# Patient Record
Sex: MALE | Race: BLACK OR AFRICAN AMERICAN | NOT HISPANIC OR LATINO | URBAN - METROPOLITAN AREA
[De-identification: names, ages, dates, MRNs, and addresses within clinical notes are randomized per-mention and may not be internally consistent; named-entity substitution may affect disease eponyms.]

---

## 2024-06-23 ENCOUNTER — HOSPITAL ENCOUNTER (INPATIENT)
Facility: HOSPITAL | Age: 84
LOS: 3 days | Discharge: HOME | DRG: 699 | End: 2024-06-27
Attending: EMERGENCY MEDICINE | Admitting: STUDENT IN AN ORGANIZED HEALTH CARE EDUCATION/TRAINING PROGRAM
Payer: MEDICARE

## 2024-06-23 DIAGNOSIS — R06.02 SHORTNESS OF BREATH: ICD-10-CM

## 2024-06-23 DIAGNOSIS — N39.0 CHRONIC UTI: ICD-10-CM

## 2024-06-23 DIAGNOSIS — R07.9 CHEST PAIN: ICD-10-CM

## 2024-06-23 DIAGNOSIS — K52.89 STERCORAL COLITIS: ICD-10-CM

## 2024-06-23 DIAGNOSIS — K59.00 CONSTIPATION, UNSPECIFIED CONSTIPATION TYPE: Primary | ICD-10-CM

## 2024-06-23 DIAGNOSIS — N12 PYELONEPHRITIS: ICD-10-CM

## 2024-06-23 PROCEDURE — 99285 EMERGENCY DEPT VISIT HI MDM: CPT | Mod: 25

## 2024-06-23 PROCEDURE — 93010 ELECTROCARDIOGRAM REPORT: CPT | Mod: ,,, | Performed by: INTERNAL MEDICINE

## 2024-06-23 PROCEDURE — 87635 SARS-COV-2 COVID-19 AMP PRB: CPT | Performed by: STUDENT IN AN ORGANIZED HEALTH CARE EDUCATION/TRAINING PROGRAM

## 2024-06-23 PROCEDURE — 83880 ASSAY OF NATRIURETIC PEPTIDE: CPT | Performed by: STUDENT IN AN ORGANIZED HEALTH CARE EDUCATION/TRAINING PROGRAM

## 2024-06-23 PROCEDURE — 80053 COMPREHEN METABOLIC PANEL: CPT | Performed by: STUDENT IN AN ORGANIZED HEALTH CARE EDUCATION/TRAINING PROGRAM

## 2024-06-23 PROCEDURE — 84484 ASSAY OF TROPONIN QUANT: CPT | Performed by: STUDENT IN AN ORGANIZED HEALTH CARE EDUCATION/TRAINING PROGRAM

## 2024-06-23 PROCEDURE — 93005 ELECTROCARDIOGRAM TRACING: CPT

## 2024-06-23 PROCEDURE — 85025 COMPLETE CBC W/AUTO DIFF WBC: CPT | Performed by: STUDENT IN AN ORGANIZED HEALTH CARE EDUCATION/TRAINING PROGRAM

## 2024-06-23 RX ORDER — FUROSEMIDE 10 MG/ML
40 INJECTION INTRAMUSCULAR; INTRAVENOUS
Status: COMPLETED | OUTPATIENT
Start: 2024-06-24 | End: 2024-06-24

## 2024-06-23 NOTE — Clinical Note
Diagnosis: Constipation, unspecified constipation type [[id number]]   Future Attending Provider: GABRIELA PUENTE III [[id number]]

## 2024-06-24 PROBLEM — R06.02 SHORTNESS OF BREATH: Status: ACTIVE | Noted: 2024-06-24

## 2024-06-24 PROBLEM — K52.9 PROCTOCOLITIS: Status: ACTIVE | Noted: 2024-06-24

## 2024-06-24 PROBLEM — N12 PYELONEPHRITIS: Status: ACTIVE | Noted: 2024-06-24

## 2024-06-24 LAB
ALBUMIN SERPL BCP-MCNC: 3.4 G/DL (ref 3.5–5.2)
ALBUMIN SERPL BCP-MCNC: 3.7 G/DL (ref 3.5–5.2)
ALLENS TEST: NORMAL
ALP SERPL-CCNC: 96 U/L (ref 55–135)
ALP SERPL-CCNC: 97 U/L (ref 55–135)
ALT SERPL W/O P-5'-P-CCNC: 11 U/L (ref 10–44)
ALT SERPL W/O P-5'-P-CCNC: 7 U/L (ref 10–44)
ANION GAP SERPL CALC-SCNC: 11 MMOL/L (ref 8–16)
ANION GAP SERPL CALC-SCNC: 8 MMOL/L (ref 8–16)
ANION GAP SERPL CALC-SCNC: 8 MMOL/L (ref 8–16)
AST SERPL-CCNC: 15 U/L (ref 10–40)
AST SERPL-CCNC: 18 U/L (ref 10–40)
BACTERIA #/AREA URNS HPF: ABNORMAL /HPF
BACTERIA #/AREA URNS HPF: ABNORMAL /HPF
BASOPHILS # BLD AUTO: 0.03 K/UL (ref 0–0.2)
BASOPHILS # BLD AUTO: 0.03 K/UL (ref 0–0.2)
BASOPHILS NFR BLD: 0.4 % (ref 0–1.9)
BASOPHILS NFR BLD: 0.4 % (ref 0–1.9)
BILIRUB SERPL-MCNC: 0.3 MG/DL (ref 0.1–1)
BILIRUB SERPL-MCNC: 0.4 MG/DL (ref 0.1–1)
BILIRUB UR QL STRIP: NEGATIVE
BILIRUB UR QL STRIP: NEGATIVE
BNP SERPL-MCNC: 38 PG/ML (ref 0–99)
BUN SERPL-MCNC: 25 MG/DL (ref 8–23)
BUN SERPL-MCNC: 26 MG/DL (ref 8–23)
BUN SERPL-MCNC: 27 MG/DL (ref 8–23)
CALCIUM SERPL-MCNC: 10 MG/DL (ref 8.7–10.5)
CALCIUM SERPL-MCNC: 9.7 MG/DL (ref 8.7–10.5)
CALCIUM SERPL-MCNC: 9.8 MG/DL (ref 8.7–10.5)
CHLORIDE SERPL-SCNC: 102 MMOL/L (ref 95–110)
CHLORIDE SERPL-SCNC: 102 MMOL/L (ref 95–110)
CHLORIDE SERPL-SCNC: 106 MMOL/L (ref 95–110)
CLARITY UR: ABNORMAL
CLARITY UR: ABNORMAL
CO2 SERPL-SCNC: 33 MMOL/L (ref 23–29)
CO2 SERPL-SCNC: 34 MMOL/L (ref 23–29)
CO2 SERPL-SCNC: 37 MMOL/L (ref 23–29)
COLOR UR: ABNORMAL
COLOR UR: ABNORMAL
CREAT SERPL-MCNC: 0.8 MG/DL (ref 0.5–1.4)
CREAT SERPL-MCNC: 0.8 MG/DL (ref 0.5–1.4)
CREAT SERPL-MCNC: 0.9 MG/DL (ref 0.5–1.4)
CTP QC/QA: YES
DELSYS: NORMAL
DIFFERENTIAL METHOD BLD: ABNORMAL
DIFFERENTIAL METHOD BLD: ABNORMAL
EOSINOPHIL # BLD AUTO: 0.3 K/UL (ref 0–0.5)
EOSINOPHIL # BLD AUTO: 0.5 K/UL (ref 0–0.5)
EOSINOPHIL NFR BLD: 4.4 % (ref 0–8)
EOSINOPHIL NFR BLD: 7.1 % (ref 0–8)
ERYTHROCYTE [DISTWIDTH] IN BLOOD BY AUTOMATED COUNT: 15.5 % (ref 11.5–14.5)
ERYTHROCYTE [DISTWIDTH] IN BLOOD BY AUTOMATED COUNT: 15.5 % (ref 11.5–14.5)
EST. GFR  (NO RACE VARIABLE): >60 ML/MIN/1.73 M^2
FIO2: 21
GLUCOSE SERPL-MCNC: 104 MG/DL (ref 70–110)
GLUCOSE SERPL-MCNC: 109 MG/DL (ref 70–110)
GLUCOSE SERPL-MCNC: 112 MG/DL (ref 70–110)
GLUCOSE UR QL STRIP: NEGATIVE
GLUCOSE UR QL STRIP: NEGATIVE
HCT VFR BLD AUTO: 40.6 % (ref 40–54)
HCT VFR BLD AUTO: 42.5 % (ref 40–54)
HGB BLD-MCNC: 12.6 G/DL (ref 14–18)
HGB BLD-MCNC: 13.2 G/DL (ref 14–18)
HGB UR QL STRIP: ABNORMAL
HGB UR QL STRIP: ABNORMAL
HYALINE CASTS #/AREA URNS LPF: 0 /LPF
HYALINE CASTS #/AREA URNS LPF: 0 /LPF
IMM GRANULOCYTES # BLD AUTO: 0.01 K/UL (ref 0–0.04)
IMM GRANULOCYTES # BLD AUTO: 0.01 K/UL (ref 0–0.04)
IMM GRANULOCYTES NFR BLD AUTO: 0.1 % (ref 0–0.5)
IMM GRANULOCYTES NFR BLD AUTO: 0.1 % (ref 0–0.5)
KETONES UR QL STRIP: NEGATIVE
KETONES UR QL STRIP: NEGATIVE
LDH SERPL L TO P-CCNC: 1.27 MMOL/L (ref 0.5–2.2)
LEUKOCYTE ESTERASE UR QL STRIP: ABNORMAL
LEUKOCYTE ESTERASE UR QL STRIP: ABNORMAL
LYMPHOCYTES # BLD AUTO: 1.7 K/UL (ref 1–4.8)
LYMPHOCYTES # BLD AUTO: 2 K/UL (ref 1–4.8)
LYMPHOCYTES NFR BLD: 24.3 % (ref 18–48)
LYMPHOCYTES NFR BLD: 28.6 % (ref 18–48)
MAGNESIUM SERPL-MCNC: 2.4 MG/DL (ref 1.6–2.6)
MCH RBC QN AUTO: 27.2 PG (ref 27–31)
MCH RBC QN AUTO: 27.4 PG (ref 27–31)
MCHC RBC AUTO-ENTMCNC: 31 G/DL (ref 32–36)
MCHC RBC AUTO-ENTMCNC: 31.1 G/DL (ref 32–36)
MCV RBC AUTO: 88 FL (ref 82–98)
MCV RBC AUTO: 88 FL (ref 82–98)
MICROSCOPIC COMMENT: ABNORMAL
MICROSCOPIC COMMENT: ABNORMAL
MODE: NORMAL
MONOCYTES # BLD AUTO: 0.9 K/UL (ref 0.3–1)
MONOCYTES # BLD AUTO: 1.1 K/UL (ref 0.3–1)
MONOCYTES NFR BLD: 12.6 % (ref 4–15)
MONOCYTES NFR BLD: 15.4 % (ref 4–15)
NEUTROPHILS # BLD AUTO: 3.4 K/UL (ref 1.8–7.7)
NEUTROPHILS # BLD AUTO: 4.1 K/UL (ref 1.8–7.7)
NEUTROPHILS NFR BLD: 48.4 % (ref 38–73)
NEUTROPHILS NFR BLD: 58.2 % (ref 38–73)
NITRITE UR QL STRIP: NEGATIVE
NITRITE UR QL STRIP: POSITIVE
NRBC BLD-RTO: 0 /100 WBC
NRBC BLD-RTO: 0 /100 WBC
PH UR STRIP: 6 [PH] (ref 5–8)
PH UR STRIP: 8 [PH] (ref 5–8)
PHOSPHATE SERPL-MCNC: 2.7 MG/DL (ref 2.7–4.5)
PLATELET # BLD AUTO: 200 K/UL (ref 150–450)
PLATELET # BLD AUTO: 207 K/UL (ref 150–450)
PMV BLD AUTO: 10.9 FL (ref 9.2–12.9)
PMV BLD AUTO: 11 FL (ref 9.2–12.9)
POCT GLUCOSE: 131 MG/DL (ref 70–110)
POTASSIUM SERPL-SCNC: 3.4 MMOL/L (ref 3.5–5.1)
POTASSIUM SERPL-SCNC: 3.6 MMOL/L (ref 3.5–5.1)
POTASSIUM SERPL-SCNC: 3.8 MMOL/L (ref 3.5–5.1)
PROT SERPL-MCNC: 8.2 G/DL (ref 6–8.4)
PROT SERPL-MCNC: 8.8 G/DL (ref 6–8.4)
PROT UR QL STRIP: ABNORMAL
PROT UR QL STRIP: ABNORMAL
RBC # BLD AUTO: 4.63 M/UL (ref 4.6–6.2)
RBC # BLD AUTO: 4.82 M/UL (ref 4.6–6.2)
RBC #/AREA URNS HPF: 10 /HPF (ref 0–4)
RBC #/AREA URNS HPF: 13 /HPF (ref 0–4)
SAMPLE: NORMAL
SARS-COV-2 RDRP RESP QL NAA+PROBE: NEGATIVE
SITE: NORMAL
SODIUM SERPL-SCNC: 147 MMOL/L (ref 136–145)
SP GR UR STRIP: 1.01 (ref 1–1.03)
SP GR UR STRIP: 1.02 (ref 1–1.03)
TRI-PHOS CRY URNS QL MICRO: ABNORMAL
TRI-PHOS CRY URNS QL MICRO: ABNORMAL
TROPONIN I SERPL DL<=0.01 NG/ML-MCNC: <0.006 NG/ML (ref 0–0.03)
URN SPEC COLLECT METH UR: ABNORMAL
URN SPEC COLLECT METH UR: ABNORMAL
UROBILINOGEN UR STRIP-ACNC: NEGATIVE EU/DL
UROBILINOGEN UR STRIP-ACNC: NEGATIVE EU/DL
WBC # BLD AUTO: 7 K/UL (ref 3.9–12.7)
WBC # BLD AUTO: 7.02 K/UL (ref 3.9–12.7)
WBC #/AREA URNS HPF: >100 /HPF (ref 0–5)
WBC #/AREA URNS HPF: >100 /HPF (ref 0–5)
WBC CLUMPS URNS QL MICRO: ABNORMAL

## 2024-06-24 PROCEDURE — 81000 URINALYSIS NONAUTO W/SCOPE: CPT | Mod: 91 | Performed by: EMERGENCY MEDICINE

## 2024-06-24 PROCEDURE — 96365 THER/PROPH/DIAG IV INF INIT: CPT

## 2024-06-24 PROCEDURE — 25000003 PHARM REV CODE 250

## 2024-06-24 PROCEDURE — 87088 URINE BACTERIA CULTURE: CPT | Mod: 59 | Performed by: EMERGENCY MEDICINE

## 2024-06-24 PROCEDURE — 63600175 PHARM REV CODE 636 W HCPCS: Performed by: EMERGENCY MEDICINE

## 2024-06-24 PROCEDURE — 87086 URINE CULTURE/COLONY COUNT: CPT | Performed by: STUDENT IN AN ORGANIZED HEALTH CARE EDUCATION/TRAINING PROGRAM

## 2024-06-24 PROCEDURE — 87077 CULTURE AEROBIC IDENTIFY: CPT | Performed by: STUDENT IN AN ORGANIZED HEALTH CARE EDUCATION/TRAINING PROGRAM

## 2024-06-24 PROCEDURE — 25000003 PHARM REV CODE 250: Performed by: NURSE PRACTITIONER

## 2024-06-24 PROCEDURE — 84100 ASSAY OF PHOSPHORUS: CPT

## 2024-06-24 PROCEDURE — 81000 URINALYSIS NONAUTO W/SCOPE: CPT | Performed by: STUDENT IN AN ORGANIZED HEALTH CARE EDUCATION/TRAINING PROGRAM

## 2024-06-24 PROCEDURE — 25000003 PHARM REV CODE 250: Performed by: EMERGENCY MEDICINE

## 2024-06-24 PROCEDURE — 83605 ASSAY OF LACTIC ACID: CPT

## 2024-06-24 PROCEDURE — 80048 BASIC METABOLIC PNL TOTAL CA: CPT | Mod: XB | Performed by: NURSE PRACTITIONER

## 2024-06-24 PROCEDURE — 87186 SC STD MICRODIL/AGAR DIL: CPT | Mod: 59 | Performed by: STUDENT IN AN ORGANIZED HEALTH CARE EDUCATION/TRAINING PROGRAM

## 2024-06-24 PROCEDURE — 87086 URINE CULTURE/COLONY COUNT: CPT | Mod: 59 | Performed by: EMERGENCY MEDICINE

## 2024-06-24 PROCEDURE — 25000242 PHARM REV CODE 250 ALT 637 W/ HCPCS: Performed by: EMERGENCY MEDICINE

## 2024-06-24 PROCEDURE — 94761 N-INVAS EAR/PLS OXIMETRY MLT: CPT

## 2024-06-24 PROCEDURE — 80053 COMPREHEN METABOLIC PANEL: CPT

## 2024-06-24 PROCEDURE — 94640 AIRWAY INHALATION TREATMENT: CPT

## 2024-06-24 PROCEDURE — 85025 COMPLETE CBC W/AUTO DIFF WBC: CPT

## 2024-06-24 PROCEDURE — 96375 TX/PRO/DX INJ NEW DRUG ADDON: CPT

## 2024-06-24 PROCEDURE — 99900035 HC TECH TIME PER 15 MIN (STAT)

## 2024-06-24 PROCEDURE — 87088 URINE BACTERIA CULTURE: CPT | Performed by: STUDENT IN AN ORGANIZED HEALTH CARE EDUCATION/TRAINING PROGRAM

## 2024-06-24 PROCEDURE — 83735 ASSAY OF MAGNESIUM: CPT

## 2024-06-24 PROCEDURE — 11000001 HC ACUTE MED/SURG PRIVATE ROOM

## 2024-06-24 RX ORDER — AMLODIPINE BESYLATE 5 MG/1
5 TABLET ORAL DAILY
Status: DISCONTINUED | OUTPATIENT
Start: 2024-06-24 | End: 2024-06-27 | Stop reason: HOSPADM

## 2024-06-24 RX ORDER — POTASSIUM CHLORIDE 20 MEQ/1
20 TABLET, EXTENDED RELEASE ORAL ONCE
Status: COMPLETED | OUTPATIENT
Start: 2024-06-24 | End: 2024-06-24

## 2024-06-24 RX ORDER — SODIUM CHLORIDE 9 MG/ML
INJECTION, SOLUTION INTRAVENOUS CONTINUOUS
Status: DISCONTINUED | OUTPATIENT
Start: 2024-06-24 | End: 2024-06-24

## 2024-06-24 RX ORDER — SODIUM CHLORIDE 0.9 % (FLUSH) 0.9 %
10 SYRINGE (ML) INJECTION EVERY 12 HOURS PRN
Status: DISCONTINUED | OUTPATIENT
Start: 2024-06-24 | End: 2024-06-27 | Stop reason: HOSPADM

## 2024-06-24 RX ORDER — POLYETHYLENE GLYCOL 3350 17 G/17G
17 POWDER, FOR SOLUTION ORAL DAILY
Status: DISCONTINUED | OUTPATIENT
Start: 2024-06-24 | End: 2024-06-27 | Stop reason: HOSPADM

## 2024-06-24 RX ORDER — TALC
6 POWDER (GRAM) TOPICAL NIGHTLY PRN
Status: DISCONTINUED | OUTPATIENT
Start: 2024-06-24 | End: 2024-06-27 | Stop reason: HOSPADM

## 2024-06-24 RX ORDER — NALOXONE HCL 0.4 MG/ML
0.02 VIAL (ML) INJECTION
Status: DISCONTINUED | OUTPATIENT
Start: 2024-06-24 | End: 2024-06-27 | Stop reason: HOSPADM

## 2024-06-24 RX ORDER — IBUPROFEN 200 MG
24 TABLET ORAL
Status: DISCONTINUED | OUTPATIENT
Start: 2024-06-24 | End: 2024-06-27 | Stop reason: HOSPADM

## 2024-06-24 RX ORDER — IBUPROFEN 200 MG
16 TABLET ORAL
Status: DISCONTINUED | OUTPATIENT
Start: 2024-06-24 | End: 2024-06-27 | Stop reason: HOSPADM

## 2024-06-24 RX ORDER — AMOXICILLIN AND CLAVULANATE POTASSIUM 875; 125 MG/1; MG/1
1 TABLET, FILM COATED ORAL 2 TIMES DAILY
Qty: 14 TABLET | Refills: 0 | Status: SHIPPED | OUTPATIENT
Start: 2024-06-24 | End: 2024-06-24

## 2024-06-24 RX ORDER — PSEUDOEPHEDRINE/ACETAMINOPHEN 30MG-500MG
100 TABLET ORAL
Status: COMPLETED | OUTPATIENT
Start: 2024-06-24 | End: 2024-06-24

## 2024-06-24 RX ORDER — CARVEDILOL 3.12 MG/1
3.12 TABLET ORAL 2 TIMES DAILY
Status: DISCONTINUED | OUTPATIENT
Start: 2024-06-24 | End: 2024-06-27 | Stop reason: HOSPADM

## 2024-06-24 RX ORDER — POTASSIUM CHLORIDE 750 MG/1
10 TABLET, EXTENDED RELEASE ORAL DAILY
Status: DISCONTINUED | OUTPATIENT
Start: 2024-06-24 | End: 2024-06-24

## 2024-06-24 RX ORDER — DEXTROSE MONOHYDRATE 50 MG/ML
INJECTION, SOLUTION INTRAVENOUS CONTINUOUS
Status: DISCONTINUED | OUTPATIENT
Start: 2024-06-24 | End: 2024-06-24

## 2024-06-24 RX ORDER — TRAZODONE HYDROCHLORIDE 150 MG/1
150 TABLET ORAL NIGHTLY
COMMUNITY
Start: 2024-06-18

## 2024-06-24 RX ORDER — PANTOPRAZOLE SODIUM 40 MG/1
40 TABLET, DELAYED RELEASE ORAL DAILY
Status: DISCONTINUED | OUTPATIENT
Start: 2024-06-24 | End: 2024-06-27 | Stop reason: HOSPADM

## 2024-06-24 RX ORDER — SYRING-NEEDL,DISP,INSUL,0.3 ML 29 G X1/2"
296 SYRINGE, EMPTY DISPOSABLE MISCELLANEOUS
Status: COMPLETED | OUTPATIENT
Start: 2024-06-24 | End: 2024-06-24

## 2024-06-24 RX ORDER — IPRATROPIUM BROMIDE AND ALBUTEROL SULFATE 2.5; .5 MG/3ML; MG/3ML
3 SOLUTION RESPIRATORY (INHALATION)
Status: COMPLETED | OUTPATIENT
Start: 2024-06-24 | End: 2024-06-24

## 2024-06-24 RX ORDER — DEXTROSE MONOHYDRATE 50 MG/ML
INJECTION, SOLUTION INTRAVENOUS CONTINUOUS
Status: ACTIVE | OUTPATIENT
Start: 2024-06-24 | End: 2024-06-24

## 2024-06-24 RX ORDER — SERTRALINE HYDROCHLORIDE 25 MG/1
25 TABLET, FILM COATED ORAL DAILY
Status: DISCONTINUED | OUTPATIENT
Start: 2024-06-24 | End: 2024-06-27 | Stop reason: HOSPADM

## 2024-06-24 RX ORDER — INSULIN ASPART 100 [IU]/ML
0-5 INJECTION, SOLUTION INTRAVENOUS; SUBCUTANEOUS
Status: DISCONTINUED | OUTPATIENT
Start: 2024-06-24 | End: 2024-06-27 | Stop reason: HOSPADM

## 2024-06-24 RX ORDER — GLUCAGON 1 MG
1 KIT INJECTION
Status: DISCONTINUED | OUTPATIENT
Start: 2024-06-24 | End: 2024-06-27 | Stop reason: HOSPADM

## 2024-06-24 RX ORDER — DEXTROSE MONOHYDRATE AND SODIUM CHLORIDE 5; .9 G/100ML; G/100ML
INJECTION, SOLUTION INTRAVENOUS CONTINUOUS
Status: DISCONTINUED | OUTPATIENT
Start: 2024-06-24 | End: 2024-06-24

## 2024-06-24 RX ORDER — ONDANSETRON HYDROCHLORIDE 2 MG/ML
4 INJECTION, SOLUTION INTRAVENOUS EVERY 8 HOURS PRN
Status: DISCONTINUED | OUTPATIENT
Start: 2024-06-24 | End: 2024-06-27 | Stop reason: HOSPADM

## 2024-06-24 RX ORDER — ACETAMINOPHEN 325 MG/1
650 TABLET ORAL EVERY 4 HOURS PRN
Status: DISCONTINUED | OUTPATIENT
Start: 2024-06-24 | End: 2024-06-27 | Stop reason: HOSPADM

## 2024-06-24 RX ADMIN — POLYETHYLENE GLYCOL 3350 17 G: 17 POWDER, FOR SOLUTION ORAL at 08:06

## 2024-06-24 RX ADMIN — SODIUM CHLORIDE 500 ML: 9 INJECTION, SOLUTION INTRAVENOUS at 04:06

## 2024-06-24 RX ADMIN — Medication 100 ML: at 04:06

## 2024-06-24 RX ADMIN — PANTOPRAZOLE SODIUM 40 MG: 40 TABLET, DELAYED RELEASE ORAL at 09:06

## 2024-06-24 RX ADMIN — IPRATROPIUM BROMIDE AND ALBUTEROL SULFATE 3 ML: 2.5; .5 SOLUTION RESPIRATORY (INHALATION) at 01:06

## 2024-06-24 RX ADMIN — FUROSEMIDE 40 MG: 10 INJECTION, SOLUTION INTRAVENOUS at 12:06

## 2024-06-24 RX ADMIN — DEXTROSE MONOHYDRATE: 50 INJECTION, SOLUTION INTRAVENOUS at 09:06

## 2024-06-24 RX ADMIN — SODIUM CHLORIDE: 9 INJECTION, SOLUTION INTRAVENOUS at 07:06

## 2024-06-24 RX ADMIN — DEXTROSE MONOHYDRATE: 50 INJECTION, SOLUTION INTRAVENOUS at 06:06

## 2024-06-24 RX ADMIN — CARVEDILOL 3.12 MG: 3.12 TABLET, FILM COATED ORAL at 08:06

## 2024-06-24 RX ADMIN — Medication 6 MG: at 08:06

## 2024-06-24 RX ADMIN — POTASSIUM CHLORIDE 10 MEQ: 750 TABLET, EXTENDED RELEASE ORAL at 09:06

## 2024-06-24 RX ADMIN — SERTRALINE HYDROCHLORIDE 25 MG: 25 TABLET ORAL at 08:06

## 2024-06-24 RX ADMIN — MAGNESIUM CITRATE 296 ML: 1.75 LIQUID ORAL at 04:06

## 2024-06-24 RX ADMIN — POTASSIUM CHLORIDE 20 MEQ: 1500 TABLET, EXTENDED RELEASE ORAL at 12:06

## 2024-06-24 RX ADMIN — CEFTRIAXONE 1 G: 1 INJECTION, POWDER, FOR SOLUTION INTRAMUSCULAR; INTRAVENOUS at 01:06

## 2024-06-24 RX ADMIN — AMLODIPINE BESYLATE 5 MG: 5 TABLET ORAL at 09:06

## 2024-06-24 NOTE — H&P
[facility]  [facility] Medicine  History & Physical    Patient Name: Jono Rose  MRN: [id number]  Patient Class: OP- Observation  Admission Date: 6/23/2024  Attending Physician: Shaun Ocampo III, MD   Primary Care Provider: Andrae Sanchez MD         Patient information was obtained from patient, past medical records, and ER records.     Subjective:     Principal Problem:Pyelonephritis    Chief Complaint:   Chief Complaint   Patient presents with    Shortness of Breath     Pt arrived via ems, pt chief complaint is Shortness of breath. Pt was sent from [facility] by nursing home doctor due to usual breathing pattern. Per  Pt appears to be using accessory muscles to breath but per sending nurse this is a normal breathing pattern for PT.         HPI: Jono Rose is a 83 y.o. with pmh of atrial fibrillation not on anticoagulation, hypertension, DM, hyperlipidemia, CHF, CAD, history of recurrent UTIs and urinary retention with suprapubic catheter in place, and dementia presents to the hospital from [facility] with complaints of shortness on breath an unusual breathing pattern due to using accessory muscles per  From nursing home. However, patient's nurse from the nursing home states that this is a normal breathing pattern for the patient. Patient states that he has some mild abdominal pain. Denies any urinary complaints and has suprapubic catheter in place. States that he had some fever/chills in the nursing home last few days. Patient denies any other alleviating or exacerbating factors. Review of systems is difficult to obtain from patient due to difficulty with speech and dementia.    In the ED:  Patient afebrile without leukocytosis, hemodynamically stable on presentation, hemoglobin 12.6, sodium 147, BUN 26, BNP and troponin levels normal, COVID influenza flu negative, UA with negative nitrites and 3+ leukocyte with many bacteria and >100 WBC, urine culture pending,  chest x-ray shows no acute intrathoracic abnormality with mild bibasilar atelectasis.  CTA abdomen pelvis shows (1) marked colonic distention with large volume of fecal material and air throughout the colon and rectum with wall thickening of the rectosigmoid colon adjacent inflammatory change findings are concerning for stercoral proctocolitis (2) circumstantial wall thickening of the urinary bladder presumably on the basis of chronic outlet obstruction noting marked prostatomegaly with the prostate identifying the bladde and suprapubic catheter in place (3) non bilateral perinephric fat stranding which can be seen with renal dysfunction or infection and correlate with urinalysis advised. Patient given DuoNeb, Rocephin 1 g IV, furosemide 40 mg IV, and brown bomb enema in the ED. Case discussed with ED provider and patient we will be placed under observation for further medical management.    Past Medical History:   Diagnosis Date    Anemia     Atrial flutter     Bacterial meningitis     BPH (benign prostatic hyperplasia)     CAD (coronary artery disease)     Cataract     CHF (congestive heart failure)     Dementia     Diabetes mellitus     Vargas catheter in place     GERD (gastroesophageal reflux disease)     Glaucoma     History of psychiatric hospitalization     sent to us from [facility]    Hyperlipidemia     Hypertension     Metabolic encephalopathy     Renal disorder     Schizoaffective disorder     Urinary retention     UTI (urinary tract infection)        Past Surgical History:   Procedure Laterality Date    CARDIAC CATHETERIZATION  09/07/2017    VESICOSTOMY N/A 9/27/2022    Procedure: CREATION, CYSTOSTOMY, cystoscopy, bladder stone removal possible laser lithotripsy;  Surgeon: Ruthy Guerra MD;  Location: [facility];  Service: Urology;  Laterality: N/A;  holmium laser needed  RN PHONE PREOP DONE WITH [facility] HOME NURSE,   VACCINATED       Review of patient's allergies indicates:  No Known Allergies    No  current facility-administered medications on file prior to encounter.     Current Outpatient Medications on File Prior to Encounter   Medication Sig    acetaminophen (TYLENOL) 325 MG tablet Take 325 mg by mouth every 6 (six) hours as needed for Pain.    AMINO ACIDS-PROTEIN HYDROLYS ORAL Take 45 mLs by mouth 2 (two) times a day.    amLODIPine (NORVASC) 5 MG tablet Take 5 mg by mouth once daily.    carvedilol (COREG) 3.125 MG tablet Take 3.125 mg by mouth 2 (two) times daily.    cyproheptadine (PERIACTIN) 4 mg tablet Take 4 mg by mouth 2 (two) times daily.    dorzolamide-timolol 2-0.5% (COSOPT) 22.3-6.8 mg/mL ophthalmic solution Place 1 drop into both eyes 2 (two) times daily.    ferrous sulfate 325 (65 FE) MG EC tablet Take 325 mg by mouth 2 (two) times daily.    melatonin (MELATIN) 3 mg tablet Take 6 mg by mouth nightly as needed for Insomnia.    multivitamin with minerals tablet Take 1 tablet by mouth once daily.    nutritional supplements Powd Take 1 packet by mouth 2 (two) times daily.    pantoprazole (PROTONIX) 40 MG tablet Take 1 tablet (40 mg total) by mouth once daily.    polyethylene glycol (GLYCOLAX) 17 gram PwPk Take 17 g by mouth 2 (two) times daily.    potassium chloride SA (K-DUR,KLOR-CON M) 10 MEQ tablet Take 1 tablet (10 mEq total) by mouth once daily.    sertraline (ZOLOFT) 25 MG tablet Take 25 mg by mouth once daily.    [DISCONTINUED] amoxicillin-clavulanate 875-125mg (AUGMENTIN) 875-125 mg per tablet Take 1 tablet by mouth 2 (two) times daily.     Family History       Problem Relation (Age of Onset)    Diabetes Mother, Father, Sister, Brother    Heart disease Mother, Father, Sister, Brother    Hypertension Mother, Father          Tobacco Use    Smoking status: Former     Current packs/day: 0.00     Types: Cigarettes     Quit date: 2003     Years since quittin.9    Smokeless tobacco: Never   Substance and Sexual Activity    Alcohol use: No    Drug use: No    Sexual activity: Not Currently      Review of Systems   Unable to perform ROS: Dementia     Objective:     Vital Signs (Most Recent):  Temp: 97.8 °F (36.6 °C) (06/24/24 0227)  Pulse: (!) 59 (06/24/24 0403)  Resp: (!) 21 (06/24/24 0403)  BP: 137/77 (06/24/24 0403)  SpO2: (!) 93 % (06/24/24 0403) Vital Signs (24h Range):  Temp:  [97.8 °F (36.6 °C)-98.2 °F (36.8 °C)] 97.8 °F (36.6 °C)  Pulse:  [59-63] 59  Resp:  [19-22] 21  SpO2:  [91 %-100 %] 93 %  BP: (112-137)/(64-81) 137/77     Weight: 90.7 kg (200 lb)  Body mass index is 27.89 kg/m².     Physical Exam  Constitutional:       General: He is not in acute distress.     Appearance: He is not diaphoretic.   HENT:      Head: Normocephalic and atraumatic.      Mouth/Throat:      Mouth: Mucous membranes are moist.   Eyes:      Extraocular Movements: Extraocular movements intact.   Cardiovascular:      Rate and Rhythm: Normal rate.      Pulses: Normal pulses.   Pulmonary:      Effort: Pulmonary effort is normal.   Abdominal:      Tenderness: There is abdominal tenderness.      Comments: Generalized abdominal pain, no rebound no tenderness   Musculoskeletal:      Right lower leg: No edema.      Left lower leg: No edema.   Skin:     General: Skin is warm.   Neurological:      General: No focal deficit present.      Mental Status: He is alert and oriented to person, place, and time. Mental status is at baseline.   Psychiatric:         Mood and Affect: Mood normal.                Significant Labs: All pertinent labs within the past 24 hours have been reviewed.    Significant Imaging: I have reviewed all pertinent imaging results/findings within the past 24 hours.  Imaging Results               CT Abdomen Pelvis  Without Contrast (Final result)  Result time 06/24/24 03:04:19      Final result by Rosalinda Sidhu MD (06/24/24 03:04:19)                   Impression:      1. Marked colonic distension with large volume of fecal material and air throughout the colon and rectum with wall thickening of the  rectosigmoid colon and adjacent inflammatory change.  Findings concerning for stercoral proctocolitis..  2. Circumferential wall thickening of the urinary bladder presumably on the basis of chronic outlet obstruction noting marked prostatomegaly with the prostate indenting the bladder base.  Suprapubic catheter in place.  3. Nonspecific bilateral perinephric fat stranding which can be seen with renal dysfunction or infection.  Correlation with urinalysis advised.  Nonobstructing right renal calculus.  4. Additional findings as above.      Electronically signed by: Rosalinda Sidhu MD  Date:    06/24/2024  Time:    03:04               Narrative:    EXAMINATION:  CT ABDOMEN PELVIS WITHOUT CONTRAST    CLINICAL HISTORY:  Abdominal pain, acute, nonlocalized;    TECHNIQUE:  Low dose axial images, sagittal and coronal reformations were obtained from the lung bases to the pubic symphysis.  No oral or IV contrast.    COMPARISON:  CT abdomen and pelvis 07/10/2023    FINDINGS:  There is elevation of the left hemidiaphragm.  The visualized lung bases are free of pleural fluid or focal consolidation allowing for respiratory motion artifact.  There is calcific atherosclerosis of the coronary vessels.    Please note evaluation of solid organ parenchyma is limited due to noncontrast CT technique.  The liver, spleen and pancreas demonstrate an unremarkable noncontrast CT appearance.  There is nonspecific bilateral nodular adrenal gland thickening, left greater than right.  No calcified stones identified in the gallbladder lumen.    There is a 5 mm nonobstructing right renal calculus.  No evidence of hydronephrosis bilaterally.  There is nonspecific bilateral perinephric fat stranding.  Urinary bladder demonstrates circumferential wall thickening.  A suprapubic catheter is in place.  The prostate is enlarged with an irregular nodular contour indenting the bladder base.    The stomach is nondistended.  There is diffuse  dilatation/distension of the entirety of the colon which contains a large volume of fecal material in air.  The rectum is distended with fecal material.  There is wall thickening of the sigmoid colon and rectum with inflammatory change.  The visualized loops of small bowel are without evidence of obstruction.  The appendix is unremarkable.  No free intraperitoneal air or portal venous gas or ascites.    The abdominal aorta is nonaneurysmal with atherosclerosis.  There are shotty periaortic lymph nodes.  No retroperitoneal fluid/hemorrhage.    Visualized osseous structures are intact/unchanged from prior exams.    This report was flagged in Epic as abnormal.                                       X-Ray Chest AP Portable (Final result)  Result time 06/24/24 01:40:28   Procedure changed from X-Ray Chest PA And Lateral     Final result by Nyasia Martin MD (06/24/24 01:40:28)                   Impression:      No acute intrathoracic abnormality detected.  Mild bibasilar atelectasis.      Electronically signed by: Nyasia Martin  Date:    06/24/2024  Time:    01:40               Narrative:    EXAMINATION:  AP PORTABLE CHEST    CLINICAL HISTORY:  dyspnea;    TECHNIQUE:  AP portable chest radiograph was submitted.    COMPARISON:  01/08/2024    FINDINGS:  AP portable chest radiograph demonstrates a cardiac silhouette within normal limits.  There is no focal consolidation, pneumothorax, or pleural effusion.  Mild bibasilar atelectatic changes are present.  There is air within the colonic walls below the diaphragm.  The bones are diffusely osteopenic.                                      Assessment/Plan:     * Pyelonephritis  -Patient presents with generalized abdominal pain and suprapubic catheter in place from [facility]. Patient initially sent to the ER for shortness on breath however on labs more specifically UA showed 3+ leukocyte, negative nitrites, many bacteria, and>100 wbc.   -CT scan with nonspecific  bilateral perinephric fat stranding concerning for pyelonephritis with a nonobstructing right renal calculi.    -Started on Rocephin in the ED for acute cystitis/pyelonephritis     Plan:  -Urine cultures pending  -IVF  cc/hour  -Continue IV Rocephin  -P.r.n. analgesics and antiemetics ordered    Proctocolitis  -Patient presents with generalized abdominal pain, CT abdomen pelvis shows colonic distention with large volume of fecal material and air throughout the colon and rectum with wall thickening of the rectosigmoid colon and adjacent inflammatory change concerning for proctocolitis.  -Patient can not recall his last bowel movement    Plan:  -Patient was given brown bomb in the ED with a large bowel movement according to the nurse  -Continue IV fluid and bowel regimen with daily laxatives  -Clear liquid diet for now, advance as tolerated to regular    Shortness of breath  Presented to the hospital from based on nursing home due to shortness on breath and witnessed accessory muscle usage during breathing.  Patient given breathing treatment while in the ED and currently sats are well 93 to 95.  P.r.n. duo nebs ordered  Supplemental oxygen as needed, wean as tolerated    UTI (urinary tract infection) due to urinary indwelling Vargas catheter  -See above plan for pyelonephritis  -Suprapubic catheter replaced in the ED  -Evidence of infection on UA, urine cultures pending continue IV antibiotics    Paroxysmal atrial fibrillation with RVR  Patient with Paroxysmal (<7 days) atrial fibrillation which is controlled. Patient is currently in sinus rhythm.[id number] Score: 3. Anticoagulation not indicated due to due to risk of bleeding with gastric pneumatosis .    Urinary retention  Patient with a history of urinary retention with suprapubic catheter  Urinalysis shows 3+ leukocyte many bacteria, >100 WBC  Started on IV Rocephin empirically for UTI  Urine culture pending  Catheter replaced in the ED    Anemia of chronic  disease  Patient's anemia is currently controlled. Has not received any PRBCs to date. Etiology likely d/t chronic disease due to Chronic Kidney Disease  Current CBC reviewed-   Lab Results   Component Value Date    HGB 12.6 (L) 06/23/2024    HCT 40.6 06/23/2024     Monitor serial CBC and transfuse if patient becomes hemodynamically unstable, symptomatic or H/H drops below 7/21.    Chronic heart failure with preserved ejection fraction  Patient with a history of diastolic heart failure.  No evidence of fluid overload or acute decompensation.  Continue home regimen, I's and O's, and daily weights    Essential hypertension  Chronic, controlled. Latest blood pressure and vitals reviewed-     Temp:  [97.8 °F (36.6 °C)-98.2 °F (36.8 °C)]   Pulse:  [59-63]   Resp:  [19-22]   BP: (112-137)/(64-81)   SpO2:  [91 %-100 %] .   Home meds for hypertension were reviewed and noted below.   Hypertension Medications               amLODIPine (NORVASC) 5 MG tablet Take 5 mg by mouth once daily.    carvedilol (COREG) 3.125 MG tablet Take 3.125 mg by mouth 2 (two) times daily.            While in the hospital, will manage blood pressure as follows; Continue home antihypertensive regimen    Will utilize p.r.n. blood pressure medication only if patient's blood pressure greater than 180/110 and he develops symptoms such as worsening chest pain or shortness of breath.    Hyperlipidemia  Patient currently not on statin, defer to PCP      VTE Risk Mitigation (From admission, onward)           Ordered     Reason for No Pharmacological VTE Prophylaxis  Once        Question:  Reasons:  Answer:  Risk of Bleeding    06/24/24 0415     IP VTE HIGH RISK PATIENT  Once         06/24/24 0415     Place sequential compression device  Until discontinued         06/24/24 0415                     On 06/24/2024, patient should be placed in hospital observation services under my care in collaboration with Shaun Ocampo MD.      AdmissionCare    Guideline:  Gastroenterology, Inpatient    Based on the indications selected for the patient, the bed status of Inpatient was determined to be NOT MET    The following indications were selected as present at the time of evaluation of the patient:      - Clinical Indications for Admission to Inpatient Care            - Hospital admission is needed for appropriate care of the patient because of 1 or more of the following:    AdmissionCare documentation entered by: ELIANE Rahman    [facility], 28th edition, Copyright © 2024 [facility], [facility] All Rights Reserved.  [id number]:00    Lupis Arora PA-C  Department of Hospital Medicine  [facility] - Observation Unit

## 2024-06-24 NOTE — PLAN OF CARE
Inpatient Upgrade Note    Jono Rose has warranted treatment spanning two or more midnights of hospital level care for the management of  pyelonephritis and proctocolitis . He continues to require IV antibiotics, daily labs, monitoring of vital signs, and advancing diet. His condition is also complicated by the following comorbidities: Coronary Artery Disease, Hypertension, Diabetes, and Heart failure.

## 2024-06-24 NOTE — ASSESSMENT & PLAN NOTE
Patient with Paroxysmal (<7 days) atrial fibrillation which is controlled. Patient is currently in sinus rhythm.[id number] Score: 3. Anticoagulation not indicated due to due to risk of bleeding with gastric pneumatosis .

## 2024-06-24 NOTE — ASSESSMENT & PLAN NOTE
-Patient presents with generalized abdominal pain and suprapubic catheter in place from [facility]. Patient initially sent to the ER for shortness on breath however on labs more specifically UA showed 3+ leukocyte, negative nitrites, many bacteria, and>100 wbc.   -CT scan with nonspecific bilateral perinephric fat stranding concerning for pyelonephritis with a nonobstructing right renal calculi.    -Started on Rocephin in the ED for acute cystitis/pyelonephritis     Plan:  -Urine cultures pending  -IVF  cc/hour  -Continue IV Rocephin  -P.r.n. analgesics and antiemetics ordered

## 2024-06-24 NOTE — CARE UPDATE
83 year old male admitted for Pyelonephritis    Patient afebrile without leukocytosis, hemodynamically stable on presentation, hemoglobin 12.6, sodium 147, BUN 26, BNP and troponin levels normal, COVID influenza flu negative, UA with negative nitrites and 3+ leukocyte with many bacteria and >100 WBC, urine culture pending, chest x-ray shows no acute intrathoracic abnormality with mild bibasilar atelectasis. CTA abdomen pelvis shows (1) marked colonic distention with large volume of fecal material and air throughout the colon and rectum with wall thickening of the rectosigmoid colon adjacent inflammatory change findings are concerning for stercoral proctocolitis (2) circumstantial wall thickening of the urinary bladder presumably on the basis of chronic outlet obstruction noting marked prostatomegaly with the prostate identifying the bladde and suprapubic catheter in place (3) non bilateral perinephric fat stranding which can be seen with renal dysfunction or infection and correlate with urinalysis advised \\      Patient seen ane examined.  Reviewed H&P, labs, and vital signs  Switched IVF to D5 at 50 ml/hr   Repeat BMP at noon and in the am   Continue with current medical regimen

## 2024-06-24 NOTE — HPI
Jono Rose is a 83 y.o. with pmh of atrial fibrillation not on anticoagulation, hypertension, DM, hyperlipidemia, CHF, CAD, history of recurrent UTIs and urinary retention with suprapubic catheter in place, and dementia presents to the hospital from [facility] with complaints of shortness on breath an unusual breathing pattern due to using accessory muscles per  From nursing home. However, patient's nurse from the nursing home states that this is a normal breathing pattern for the patient. Patient states that he has some mild abdominal pain. Denies any urinary complaints and has suprapubic catheter in place. States that he had some fever/chills in the nursing home last few days. Patient denies any other alleviating or exacerbating factors. Review of systems is difficult to obtain from patient due to difficulty with speech and dementia.    In the ED:  Patient afebrile without leukocytosis, hemodynamically stable on presentation, hemoglobin 12.6, sodium 147, BUN 26, BNP and troponin levels normal, COVID influenza flu negative, UA with negative nitrites and 3+ leukocyte with many bacteria and >100 WBC, urine culture pending, chest x-ray shows no acute intrathoracic abnormality with mild bibasilar atelectasis.  CTA abdomen pelvis shows (1) marked colonic distention with large volume of fecal material and air throughout the colon and rectum with wall thickening of the rectosigmoid colon adjacent inflammatory change findings are concerning for stercoral proctocolitis (2) circumstantial wall thickening of the urinary bladder presumably on the basis of chronic outlet obstruction noting marked prostatomegaly with the prostate identifying the bladde and suprapubic catheter in place (3) non bilateral perinephric fat stranding which can be seen with renal dysfunction or infection and correlate with urinalysis advised. Patient given DuoNeb, Rocephin 1 g IV, furosemide 40 mg IV, and brown bomb enema in the ED. Case  discussed with ED provider and patient we will be placed under observation for further medical management.

## 2024-06-24 NOTE — ASSESSMENT & PLAN NOTE
Chronic, controlled. Latest blood pressure and vitals reviewed-     Temp:  [97.8 °F (36.6 °C)-98.5 °F (36.9 °C)]   Pulse:  [59-67]   Resp:  [18-22]   BP: (112-137)/(64-82)   SpO2:  [91 %-100 %] .   Home meds for hypertension were reviewed and noted below.   Hypertension Medications               amLODIPine (NORVASC) 5 MG tablet Take 5 mg by mouth once daily.    carvedilol (COREG) 3.125 MG tablet Take 3.125 mg by mouth 2 (two) times daily.            While in the hospital, will manage blood pressure as follows; Continue home antihypertensive regimen    Will utilize p.r.n. blood pressure medication only if patient's blood pressure greater than 180/110 and he develops symptoms such as worsening chest pain or shortness of breath.

## 2024-06-24 NOTE — ED PROVIDER NOTES
Encounter Date: 6/23/2024    SCRIBE #1 NOTE: I, Brooke Steiner, am scribing for, and in the presence of,  Bravo Tarango MD.       History     Chief Complaint   Patient presents with    Shortness of Breath     Pt arrived via ems, pt chief complaint is Shortness of breath. Pt was sent from [facility] by [facility] home doctor due to usual breathing pattern. Per DR. Pt appears to be using accessory muscles to breath but per sending nurse this is a normal breathing pattern for PT.      84 yo M w/ PMHx of Aflutter, CHF, CAD, DM, dementia, GERD, HTN, HLD, hx of previous UTIs presenting to the ED for reported dyspnea. Patient is currently a resident at [facility] and was sent here for evaluation due to reported abnormal breathing, using accessory muscles to breathe. He currently denies any dyspnea, leg swelling, any somatic pain, other complaints. Patient also has a suprapubic catheter in place.   History from patient limited d/t dementia.     The history is provided by the patient and the nursing home.     Review of patient's allergies indicates:  No Known Allergies  Past Medical History:   Diagnosis Date    Anemia     Atrial flutter     Bacterial meningitis     BPH (benign prostatic hyperplasia)     CAD (coronary artery disease)     Cataract     CHF (congestive heart failure)     Dementia     Diabetes mellitus     Vargas catheter in place     GERD (gastroesophageal reflux disease)     Glaucoma     History of psychiatric hospitalization     sent to us from [facility]    Hyperlipidemia     Hypertension     Metabolic encephalopathy     Nursing home resident     Renal disorder     Schizoaffective disorder     Urinary retention     UTI (urinary tract infection)      Past Surgical History:   Procedure Laterality Date    CARDIAC CATHETERIZATION  09/07/2017    VESICOSTOMY N/A 9/27/2022    Procedure: CREATION, CYSTOSTOMY, cystoscopy, bladder stone removal possible laser lithotripsy;  Surgeon: Ruthy Guerra MD;   Location: [facility] OR;  Service: Urology;  Laterality: N/A;  holmium laser needed  RN PHONE PREOP DONE WITH [facility] HOME NURSE,   VACCINATED     Family History   Problem Relation Name Age of Onset    Hypertension Mother      Heart disease Mother      Diabetes Mother      Heart disease Father      Hypertension Father      Diabetes Father      Heart disease Sister      Diabetes Sister      Heart disease Brother      Diabetes Brother      Anxiety disorder Neg Hx      Depression Neg Hx       Social History     Tobacco Use    Smoking status: Former     Current packs/day: 0.00     Types: Cigarettes     Quit date: 2003     Years since quittin.9    Smokeless tobacco: Never   Substance Use Topics    Alcohol use: No    Drug use: No     Review of Systems   Unable to perform ROS: Dementia       Physical Exam     Initial Vitals [24 2312]   BP Pulse Resp Temp SpO2   123/77 62 (!) 22 98.2 °F (36.8 °C) 100 %      MAP       --         Physical Exam    Nursing note and vitals reviewed.  Constitutional: He appears well-developed and well-nourished. He is not diaphoretic. No distress.   HENT:   Head: Normocephalic and atraumatic.   Right Ear: External ear normal.   Left Ear: External ear normal.   Nose: Nose normal.   Eyes: Conjunctivae are normal. No scleral icterus.   Neck: Neck supple. No tracheal deviation present.   Cardiovascular:  Normal rate, regular rhythm and normal heart sounds.     Exam reveals no gallop and no friction rub.       No murmur heard.  Pulmonary/Chest: Breath sounds normal. No respiratory distress.   Mild increased accessory muscle usage. No rapid breathing.    Abdominal: Abdomen is soft. Bowel sounds are normal. There is no abdominal tenderness.   Genitourinary:    Genitourinary Comments: Suprapubic catheter in place     Musculoskeletal:         General: No edema.      Cervical back: Neck supple.     Neurological: He is alert and oriented to person, place, and time. GCS score is 15. GCS eye subscore is  4. GCS verbal subscore is 5. GCS motor subscore is 6.   Skin: Skin is warm and dry.   Psychiatric: He has a normal mood and affect. Thought content normal.         ED Course   Critical Care    Date/Time: 6/24/2024 7:22 AM    Performed by: Bravo Tarango MD  Authorized by: Bravo Tarango MD  Direct patient critical care time: 20 minutes  Additional history critical care time: 5 minutes  Ordering / reviewing critical care time: 10 minutes  Documentation critical care time: 10 minutes  Consulting other physicians critical care time: 10 minutes  Total critical care time (exclusive of procedural time) : 55 minutes  Critical care time was exclusive of teaching time and separately billable procedures and treating other patients.  Critical care was necessary to treat or prevent imminent or life-threatening deterioration of the following conditions: respiratory failure and sepsis.  Critical care was time spent personally by me on the following activities: development of treatment plan with patient or surrogate, discussions with consultants, evaluation of patient's response to treatment, examination of patient, obtaining history from patient or surrogate, ordering and performing treatments and interventions, ordering and review of laboratory studies, ordering and review of radiographic studies, pulse oximetry, re-evaluation of patient's condition, review of old charts and interpretation of cardiac output measurements.        Labs Reviewed   CBC W/ AUTO DIFFERENTIAL - Abnormal; Notable for the following components:       Result Value    Hemoglobin 12.6 (*)     MCHC 31.0 (*)     RDW 15.5 (*)     Mono # 1.1 (*)     Mono % 15.4 (*)     All other components within normal limits   COMPREHENSIVE METABOLIC PANEL - Abnormal; Notable for the following components:    Sodium 147 (*)     CO2 33 (*)     BUN 26 (*)     Albumin 3.4 (*)     ALT 7 (*)     All other components within normal limits   URINALYSIS, REFLEX TO URINE  CULTURE - Abnormal; Notable for the following components:    Color, UA Orange (*)     Appearance, UA Cloudy (*)     Protein, UA 2+ (*)     Occult Blood UA 2+ (*)     Nitrite, UA Positive (*)     Leukocytes, UA 3+ (*)     All other components within normal limits    Narrative:     Specimen Source->Urine   URINALYSIS MICROSCOPIC - Abnormal; Notable for the following components:    RBC, UA 10 (*)     WBC, UA >100 (*)     Bacteria Many (*)     All other components within normal limits    Narrative:     Specimen Source->Urine   URINALYSIS, REFLEX TO URINE CULTURE - Abnormal; Notable for the following components:    Color, UA Orange (*)     Appearance, UA Cloudy (*)     Protein, UA 1+ (*)     Occult Blood UA 3+ (*)     Leukocytes, UA 3+ (*)     All other components within normal limits    Narrative:     Specimen Source->Urine   URINALYSIS MICROSCOPIC - Abnormal; Notable for the following components:    RBC, UA 13 (*)     WBC, UA >100 (*)     WBC Clumps, UA Many (*)     Bacteria Many (*)     All other components within normal limits    Narrative:     Specimen Source->Urine   COMPREHENSIVE METABOLIC PANEL - Abnormal; Notable for the following components:    Sodium 147 (*)     CO2 34 (*)     BUN 25 (*)     Total Protein 8.8 (*)     All other components within normal limits   CBC W/ AUTO DIFFERENTIAL - Abnormal; Notable for the following components:    Hemoglobin 13.2 (*)     MCHC 31.1 (*)     RDW 15.5 (*)     All other components within normal limits   BASIC METABOLIC PANEL - Abnormal; Notable for the following components:    Sodium 147 (*)     Potassium 3.4 (*)     CO2 37 (*)     Glucose 112 (*)     BUN 27 (*)     All other components within normal limits   B-TYPE NATRIURETIC PEPTIDE   TROPONIN I   MAGNESIUM   PHOSPHORUS   SARS-COV-2 RDRP GENE   ISTAT LACTATE   POCT GLUCOSE MONITORING CONTINUOUS     EKG Readings: (Independently Interpreted)   Initial Reading: No STEMI. Ectopy: No Ectopy.   Normal sinus rhythm rate of 62,  nonspecific ST and T-wave abnormalities.     ECG Results              EKG 12-lead (Final result)        Collection Time Result Time QRS Duration OHS QTC Calculation    06/23/24 23:35:16 06/25/24 23:34:15 86 450                     Final result by Lucy, Lab In [facility] (06/25/24 23:34:24)                   Narrative:    Test Reason : R06.02,    Vent. Rate : 062 BPM     Atrial Rate : 062 BPM     P-R Int : 156 ms          QRS Dur : 086 ms      QT Int : 444 ms       P-R-T Axes : 022 015 118 degrees     QTc Int : 450 ms    Normal sinus rhythm  Nonspecific ST and T wave abnormality  Abnormal ECG  When compared with ECG of 08-JAN-2024 14:16,  Significant changes have occurred  Confirmed by Dahiana MONTALVO, Santos BRITTON ([id number]) on 6/25/2024 11:34:14 PM    Referred By: KEILA   SELF           Confirmed By:Santos Talbot MD                                  Imaging Results               CT Abdomen Pelvis  Without Contrast (Final result)  Result time 06/24/24 03:04:19      Final result by Rosalinda Sidhu MD (06/24/24 03:04:19)                   Impression:      1. Marked colonic distension with large volume of fecal material and air throughout the colon and rectum with wall thickening of the rectosigmoid colon and adjacent inflammatory change.  Findings concerning for stercoral proctocolitis..  2. Circumferential wall thickening of the urinary bladder presumably on the basis of chronic outlet obstruction noting marked prostatomegaly with the prostate indenting the bladder base.  Suprapubic catheter in place.  3. Nonspecific bilateral perinephric fat stranding which can be seen with renal dysfunction or infection.  Correlation with urinalysis advised.  Nonobstructing right renal calculus.  4. Additional findings as above.      Electronically signed by: Rosalinda Sidhu MD  Date:    06/24/2024  Time:    03:04               Narrative:    EXAMINATION:  CT ABDOMEN PELVIS WITHOUT CONTRAST    CLINICAL HISTORY:  Abdominal pain, acute,  nonlocalized;    TECHNIQUE:  Low dose axial images, sagittal and coronal reformations were obtained from the lung bases to the pubic symphysis.  No oral or IV contrast.    COMPARISON:  CT abdomen and pelvis 07/10/2023    FINDINGS:  There is elevation of the left hemidiaphragm.  The visualized lung bases are free of pleural fluid or focal consolidation allowing for respiratory motion artifact.  There is calcific atherosclerosis of the coronary vessels.    Please note evaluation of solid organ parenchyma is limited due to noncontrast CT technique.  The liver, spleen and pancreas demonstrate an unremarkable noncontrast CT appearance.  There is nonspecific bilateral nodular adrenal gland thickening, left greater than right.  No calcified stones identified in the gallbladder lumen.    There is a 5 mm nonobstructing right renal calculus.  No evidence of hydronephrosis bilaterally.  There is nonspecific bilateral perinephric fat stranding.  Urinary bladder demonstrates circumferential wall thickening.  A suprapubic catheter is in place.  The prostate is enlarged with an irregular nodular contour indenting the bladder base.    The stomach is nondistended.  There is diffuse dilatation/distension of the entirety of the colon which contains a large volume of fecal material in air.  The rectum is distended with fecal material.  There is wall thickening of the sigmoid colon and rectum with inflammatory change.  The visualized loops of small bowel are without evidence of obstruction.  The appendix is unremarkable.  No free intraperitoneal air or portal venous gas or ascites.    The abdominal aorta is nonaneurysmal with atherosclerosis.  There are shotty periaortic lymph nodes.  No retroperitoneal fluid/hemorrhage.    Visualized osseous structures are intact/unchanged from prior exams.    This report was flagged in Epic as abnormal.                                       X-Ray Chest AP Portable (Final result)  Result time 06/24/24  01:40:28   Procedure changed from X-Ray Chest PA And Lateral     Final result by Nyasia Martin MD (06/24/24 01:40:28)                   Impression:      No acute intrathoracic abnormality detected.  Mild bibasilar atelectasis.      Electronically signed by: Nyasia Martin  Date:    06/24/2024  Time:    01:40               Narrative:    EXAMINATION:  AP PORTABLE CHEST    CLINICAL HISTORY:  dyspnea;    TECHNIQUE:  AP portable chest radiograph was submitted.    COMPARISON:  01/08/2024    FINDINGS:  AP portable chest radiograph demonstrates a cardiac silhouette within normal limits.  There is no focal consolidation, pneumothorax, or pleural effusion.  Mild bibasilar atelectatic changes are present.  There is air within the colonic walls below the diaphragm.  The bones are diffusely osteopenic.                                       Medications   sodium chloride 0.9% flush 10 mL (has no administration in time range)   naloxone 0.4 mg/mL injection 0.02 mg (has no administration in time range)   glucose chewable tablet 16 g (has no administration in time range)   glucose chewable tablet 24 g (has no administration in time range)   glucagon (human recombinant) injection 1 mg (has no administration in time range)   acetaminophen tablet 650 mg (has no administration in time range)   ondansetron injection 4 mg (has no administration in time range)   melatonin tablet 6 mg (6 mg Oral Given 6/25/24 2028)   dextrose 10% bolus 125 mL 125 mL (has no administration in time range)   dextrose 10% bolus 250 mL 250 mL (has no administration in time range)   polyethylene glycol packet 17 g (17 g Oral Given 6/25/24 0920)   amLODIPine tablet 5 mg (5 mg Oral Given 6/25/24 0919)   carvediloL tablet 3.125 mg (3.125 mg Oral Given 6/25/24 2027)   pantoprazole EC tablet 40 mg (40 mg Oral Given 6/25/24 0919)   sertraline tablet 25 mg (25 mg Oral Given 6/25/24 0919)   glucose chewable tablet 16 g (has no administration in time range)    glucose chewable tablet 24 g (has no administration in time range)   glucagon (human recombinant) injection 1 mg (has no administration in time range)   insulin aspart U-100 pen 0-5 Units (has no administration in time range)   dextrose 10% bolus 125 mL 125 mL (has no administration in time range)   dextrose 10% bolus 250 mL 250 mL (has no administration in time range)   D5W infusion ( Intravenous New Bag 6/24/24 1832)   senna-docusate 8.6-50 mg per tablet 2 tablet (2 tablets Oral Given 6/25/24 2028)   ertapenem (INVANZ) 1 g in 0.9% NaCl 100 mL IVPB (MB+) (has no administration in time range)   furosemide injection 40 mg (40 mg Intravenous Given 6/24/24 0034)   albuterol-ipratropium 2.5 mg-0.5 mg/3 mL nebulizer solution 3 mL (3 mLs Nebulization Given 6/24/24 0101)   cefTRIAXone (Rocephin) 1 g in D5W 100 mL IVPB (MB+) (0 g Intravenous Stopped 6/24/24 0214)   glycerin 99.5% topical solution 100 mL (100 mLs Rectal Given 6/24/24 0415)     And   magnesium citrate solution 296 mL (296 mLs Rectal Given 6/24/24 0415)     And   sodium chloride 0.9% bolus 500 mL 500 mL (0 mLs Rectal Stopped 6/24/24 0430)   potassium chloride SA CR tablet 20 mEq (20 mEq Oral Given 6/24/24 1257)   bisacodyL suppository 10 mg (10 mg Rectal Given 6/25/24 1740)     Medical Decision Making  84 yo M w/ PMHx of Aflutter, CHF, CAD, DM, dementia, GERD, HTN, HLD, hx of previous UTIs presenting to the ED for reported dyspnea. VSS, nursing notes reviewed. Physical exam as above. Differential diagnosis includes, but is not limited to: pulmonary infectious process, allergic rhinitis, postnasal drip, allergic bronchitis, sinusitis, infectious bronchitis, COPD, asthma, pulmonary embolus, pleural effusion, myocardial ischemia, cardiac valvulopathy, and congestive heart failure. Will order labs, cxr, EKG and reassess.     Amount and/or Complexity of Data Reviewed  Labs: ordered. Decision-making details documented in ED Course.  Radiology: ordered.  Decision-making details documented in ED Course.  ECG/medicine tests: ordered and independent interpretation performed. Decision-making details documented in ED Course.    Risk  OTC drugs.  Prescription drug management.  Decision regarding hospitalization.            Scribe Attestation:   Scribe #1: I performed the above scribed service and the documentation accurately describes the services I performed. I attest to the accuracy of the note.                           I, Bravo Tarango, personally performed the services described in this documentation. All medical record entries made by the scribe were at my direction and in my presence. I have reviewed the chart and agree that the record reflects my personal performance and is accurate and complete.      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

## 2024-06-24 NOTE — SUBJECTIVE & OBJECTIVE
Past Medical History:   Diagnosis Date    Anemia     Atrial flutter     Bacterial meningitis     BPH (benign prostatic hyperplasia)     CAD (coronary artery disease)     Cataract     CHF (congestive heart failure)     Dementia     Diabetes mellitus     Vargas catheter in place     GERD (gastroesophageal reflux disease)     Glaucoma     History of psychiatric hospitalization     sent to us from [facility]    Hyperlipidemia     Hypertension     Metabolic encephalopathy     Renal disorder     Schizoaffective disorder     Urinary retention     UTI (urinary tract infection)        Past Surgical History:   Procedure Laterality Date    CARDIAC CATHETERIZATION  09/07/2017    VESICOSTOMY N/A 9/27/2022    Procedure: CREATION, CYSTOSTOMY, cystoscopy, bladder stone removal possible laser lithotripsy;  Surgeon: Ruthy Guerra MD;  Location: [facility];  Service: Urology;  Laterality: N/A;  holmium laser needed  RN PHONE PREOP DONE WITH [facility] NURSE,   VACCINATED       Review of patient's allergies indicates:  No Known Allergies    No current facility-administered medications on file prior to encounter.     Current Outpatient Medications on File Prior to Encounter   Medication Sig    acetaminophen (TYLENOL) 325 MG tablet Take 325 mg by mouth every 6 (six) hours as needed for Pain.    AMINO ACIDS-PROTEIN HYDROLYS ORAL Take 45 mLs by mouth 2 (two) times a day.    amLODIPine (NORVASC) 5 MG tablet Take 5 mg by mouth once daily.    carvedilol (COREG) 3.125 MG tablet Take 3.125 mg by mouth 2 (two) times daily.    cyproheptadine (PERIACTIN) 4 mg tablet Take 4 mg by mouth 2 (two) times daily.    dorzolamide-timolol 2-0.5% (COSOPT) 22.3-6.8 mg/mL ophthalmic solution Place 1 drop into both eyes 2 (two) times daily.    ferrous sulfate 325 (65 FE) MG EC tablet Take 325 mg by mouth 2 (two) times daily.    melatonin (MELATIN) 3 mg tablet Take 6 mg by mouth nightly as needed for Insomnia.    multivitamin with minerals tablet Take 1  tablet by mouth once daily.    nutritional supplements Powd Take 1 packet by mouth 2 (two) times daily.    pantoprazole (PROTONIX) 40 MG tablet Take 1 tablet (40 mg total) by mouth once daily.    polyethylene glycol (GLYCOLAX) 17 gram PwPk Take 17 g by mouth 2 (two) times daily.    potassium chloride SA (K-DUR,KLOR-CON M) 10 MEQ tablet Take 1 tablet (10 mEq total) by mouth once daily.    sertraline (ZOLOFT) 25 MG tablet Take 25 mg by mouth once daily.    [DISCONTINUED] amoxicillin-clavulanate 875-125mg (AUGMENTIN) 875-125 mg per tablet Take 1 tablet by mouth 2 (two) times daily.     Family History       Problem Relation (Age of Onset)    Diabetes Mother, Father, Sister, Brother    Heart disease Mother, Father, Sister, Brother    Hypertension Mother, Father          Tobacco Use    Smoking status: Former     Current packs/day: 0.00     Types: Cigarettes     Quit date: 2003     Years since quittin.9    Smokeless tobacco: Never   Substance and Sexual Activity    Alcohol use: No    Drug use: No    Sexual activity: Not Currently     Review of Systems   Unable to perform ROS: Dementia     Objective:     Vital Signs (Most Recent):  Temp: 97.8 °F (36.6 °C) (24 0227)  Pulse: (!) 59 (24 0403)  Resp: (!) 21 (24)  BP: 137/77 (24)  SpO2: (!) 93 % (24) Vital Signs (24h Range):  Temp:  [97.8 °F (36.6 °C)-98.2 °F (36.8 °C)] 97.8 °F (36.6 °C)  Pulse:  [59-63] 59  Resp:  [19-22] 21  SpO2:  [91 %-100 %] 93 %  BP: (112-137)/(64-81) 137/77     Weight: 90.7 kg (200 lb)  Body mass index is 27.89 kg/m².     Physical Exam  Constitutional:       General: He is not in acute distress.     Appearance: He is not diaphoretic.   HENT:      Head: Normocephalic and atraumatic.      Mouth/Throat:      Mouth: Mucous membranes are moist.   Eyes:      Extraocular Movements: Extraocular movements intact.   Cardiovascular:      Rate and Rhythm: Normal rate.      Pulses: Normal pulses.   Pulmonary:       Effort: Pulmonary effort is normal.   Abdominal:      Tenderness: There is abdominal tenderness.      Comments: Generalized abdominal pain, no rebound no tenderness   Musculoskeletal:      Right lower leg: No edema.      Left lower leg: No edema.   Skin:     General: Skin is warm.   Neurological:      General: No focal deficit present.      Mental Status: He is alert and oriented to person, place, and time. Mental status is at baseline.   Psychiatric:         Mood and Affect: Mood normal.                Significant Labs: All pertinent labs within the past 24 hours have been reviewed.    Significant Imaging: I have reviewed all pertinent imaging results/findings within the past 24 hours.  Imaging Results               CT Abdomen Pelvis  Without Contrast (Final result)  Result time 06/24/24 03:04:19      Final result by Rosalinda Sidhu MD (06/24/24 03:04:19)                   Impression:      1. Marked colonic distension with large volume of fecal material and air throughout the colon and rectum with wall thickening of the rectosigmoid colon and adjacent inflammatory change.  Findings concerning for stercoral proctocolitis..  2. Circumferential wall thickening of the urinary bladder presumably on the basis of chronic outlet obstruction noting marked prostatomegaly with the prostate indenting the bladder base.  Suprapubic catheter in place.  3. Nonspecific bilateral perinephric fat stranding which can be seen with renal dysfunction or infection.  Correlation with urinalysis advised.  Nonobstructing right renal calculus.  4. Additional findings as above.      Electronically signed by: Rosalinda Sidhu MD  Date:    06/24/2024  Time:    03:04               Narrative:    EXAMINATION:  CT ABDOMEN PELVIS WITHOUT CONTRAST    CLINICAL HISTORY:  Abdominal pain, acute, nonlocalized;    TECHNIQUE:  Low dose axial images, sagittal and coronal reformations were obtained from the lung bases to the pubic symphysis.  No oral or IV  contrast.    COMPARISON:  CT abdomen and pelvis 07/10/2023    FINDINGS:  There is elevation of the left hemidiaphragm.  The visualized lung bases are free of pleural fluid or focal consolidation allowing for respiratory motion artifact.  There is calcific atherosclerosis of the coronary vessels.    Please note evaluation of solid organ parenchyma is limited due to noncontrast CT technique.  The liver, spleen and pancreas demonstrate an unremarkable noncontrast CT appearance.  There is nonspecific bilateral nodular adrenal gland thickening, left greater than right.  No calcified stones identified in the gallbladder lumen.    There is a 5 mm nonobstructing right renal calculus.  No evidence of hydronephrosis bilaterally.  There is nonspecific bilateral perinephric fat stranding.  Urinary bladder demonstrates circumferential wall thickening.  A suprapubic catheter is in place.  The prostate is enlarged with an irregular nodular contour indenting the bladder base.    The stomach is nondistended.  There is diffuse dilatation/distension of the entirety of the colon which contains a large volume of fecal material in air.  The rectum is distended with fecal material.  There is wall thickening of the sigmoid colon and rectum with inflammatory change.  The visualized loops of small bowel are without evidence of obstruction.  The appendix is unremarkable.  No free intraperitoneal air or portal venous gas or ascites.    The abdominal aorta is nonaneurysmal with atherosclerosis.  There are shotty periaortic lymph nodes.  No retroperitoneal fluid/hemorrhage.    Visualized osseous structures are intact/unchanged from prior exams.    This report was flagged in Epic as abnormal.                                       X-Ray Chest AP Portable (Final result)  Result time 06/24/24 01:40:28   Procedure changed from X-Ray Chest PA And Lateral     Final result by Nyasia Martin MD (06/24/24 01:40:28)                   Impression:       No acute intrathoracic abnormality detected.  Mild bibasilar atelectasis.      Electronically signed by: Nyasia Martin  Date:    06/24/2024  Time:    01:40               Narrative:    EXAMINATION:  AP PORTABLE CHEST    CLINICAL HISTORY:  dyspnea;    TECHNIQUE:  AP portable chest radiograph was submitted.    COMPARISON:  01/08/2024    FINDINGS:  AP portable chest radiograph demonstrates a cardiac silhouette within normal limits.  There is no focal consolidation, pneumothorax, or pleural effusion.  Mild bibasilar atelectatic changes are present.  There is air within the colonic walls below the diaphragm.  The bones are diffusely osteopenic.

## 2024-06-24 NOTE — ASSESSMENT & PLAN NOTE
Patient's anemia is currently controlled. Has not received any PRBCs to date. Etiology likely d/t chronic disease due to Chronic Kidney Disease  Current CBC reviewed-   Lab Results   Component Value Date    HGB 12.6 (L) 06/23/2024    HCT 40.6 06/23/2024     Monitor serial CBC and transfuse if patient becomes hemodynamically unstable, symptomatic or H/H drops below 7/21.

## 2024-06-24 NOTE — ED TRIAGE NOTES
Pt arrived to ED via EMS from [facility] with c/o shortness of breath. As per EMS, pt was evaluated by nursing home doctor and he noticed pt using accessory muscle so referred to ED but as per nurse over there, this is pt's regular breathing pattern. When asked pt, he states of SOB and upper body pain. Pt is AAO x2. Pt has a suprapubic catheter in-situ. V/S within normal limit with O2 at 2 l/m.

## 2024-06-24 NOTE — ASSESSMENT & PLAN NOTE
Patient with a history of diastolic heart failure.  No evidence of fluid overload or acute decompensation.  Continue home regimen, I's and O's, and daily weights

## 2024-06-24 NOTE — ED NOTES
Pt had large bowel movement after enema. Pt cleaned, diaper changed and placed on a clean sheets.   Pt kept comfortably in semi gonzales's position.

## 2024-06-24 NOTE — ASSESSMENT & PLAN NOTE
-See above plan for pyelonephritis  -Suprapubic catheter replaced in the ED  -Evidence of infection on UA, urine cultures pending continue IV antibiotics

## 2024-06-24 NOTE — ASSESSMENT & PLAN NOTE
Patient with a history of urinary retention with suprapubic catheter  Urinalysis shows 3+ leukocyte many bacteria, >100 WBC  Started on IV Rocephin empirically for UTI  Urine culture pending  Catheter replaced in the ED

## 2024-06-24 NOTE — ASSESSMENT & PLAN NOTE
-Patient presents with generalized abdominal pain, CT abdomen pelvis shows colonic distention with large volume of fecal material and air throughout the colon and rectum with wall thickening of the rectosigmoid colon and adjacent inflammatory change concerning for proctocolitis.  -Patient can not recall his last bowel movement    Plan:  -Patient was given brown bomb in the ED with a large bowel movement according to the nurse  -Continue IV fluid and bowel regimen with daily laxatives  -Clear liquid diet for now, advance as tolerated to regular

## 2024-06-24 NOTE — ASSESSMENT & PLAN NOTE
Presented to the hospital from based on nursing home due to shortness on breath and witnessed accessory muscle usage during breathing.  Patient given breathing treatment while in the ED and currently sats are well 93 to 95.  P.r.n. edith rincon ordered  Supplemental oxygen as needed, wean as tolerated

## 2024-06-24 NOTE — ASSESSMENT & PLAN NOTE
Chronic, controlled. Latest blood pressure and vitals reviewed-     Temp:  [97.8 °F (36.6 °C)-98.2 °F (36.8 °C)]   Pulse:  [59-63]   Resp:  [19-22]   BP: (112-137)/(64-81)   SpO2:  [91 %-100 %] .   Home meds for hypertension were reviewed and noted below.   Hypertension Medications               amLODIPine (NORVASC) 5 MG tablet Take 5 mg by mouth once daily.    carvedilol (COREG) 3.125 MG tablet Take 3.125 mg by mouth 2 (two) times daily.            While in the hospital, will manage blood pressure as follows; Continue home antihypertensive regimen    Will utilize p.r.n. blood pressure medication only if patient's blood pressure greater than 180/110 and he develops symptoms such as worsening chest pain or shortness of breath.

## 2024-06-24 NOTE — ADMISSIONCARE
AdmissionCare    Guideline: Gastroenterology, Inpatient    Based on the indications selected for the patient, the bed status of Inpatient was determined to be NOT MET    The following indications were selected as present at the time of evaluation of the patient:      - Clinical Indications for Admission to Inpatient Care            - Hospital admission is needed for appropriate care of the patient because of 1 or more of the following:    AdmissionCare documentation entered by: ELIANE Rahman    [facility], 28th edition, Copyright © 2024 [facility], [facility] All Rights Reserved.  [id number]:00

## 2024-06-25 PROBLEM — N13.30 BILATERAL HYDRONEPHROSIS: Status: RESOLVED | Noted: 2023-07-10 | Resolved: 2024-06-25

## 2024-06-25 PROBLEM — R06.02 SHORTNESS OF BREATH: Status: RESOLVED | Noted: 2024-06-24 | Resolved: 2024-06-25

## 2024-06-25 PROBLEM — N39.0 UTI (URINARY TRACT INFECTION): Status: RESOLVED | Noted: 2017-12-05 | Resolved: 2024-06-25

## 2024-06-25 PROBLEM — N39.0 UTI (URINARY TRACT INFECTION) DUE TO URINARY INDWELLING FOLEY CATHETER: Status: RESOLVED | Noted: 2020-12-26 | Resolved: 2024-06-25

## 2024-06-25 PROBLEM — B37.0 THRUSH: Status: RESOLVED | Noted: 2023-03-23 | Resolved: 2024-06-25

## 2024-06-25 PROBLEM — N39.0 UTI (URINARY TRACT INFECTION): Status: ACTIVE | Noted: 2017-12-05

## 2024-06-25 PROBLEM — R31.9 URINARY TRACT INFECTION WITH HEMATURIA: Status: RESOLVED | Noted: 2017-12-07 | Resolved: 2024-06-25

## 2024-06-25 PROBLEM — N39.0 URINARY TRACT INFECTION WITH HEMATURIA: Status: RESOLVED | Noted: 2017-12-07 | Resolved: 2024-06-25

## 2024-06-25 PROBLEM — R78.81 BACTEREMIA: Status: RESOLVED | Noted: 2023-03-21 | Resolved: 2024-06-25

## 2024-06-25 PROBLEM — T83.511A UTI (URINARY TRACT INFECTION) DUE TO URINARY INDWELLING FOLEY CATHETER: Status: RESOLVED | Noted: 2020-12-26 | Resolved: 2024-06-25

## 2024-06-25 LAB
ALBUMIN SERPL BCP-MCNC: 3.6 G/DL (ref 3.5–5.2)
ALP SERPL-CCNC: 101 U/L (ref 55–135)
ALT SERPL W/O P-5'-P-CCNC: 12 U/L (ref 10–44)
ANION GAP SERPL CALC-SCNC: 12 MMOL/L (ref 8–16)
AST SERPL-CCNC: 20 U/L (ref 10–40)
BASOPHILS # BLD AUTO: 0.02 K/UL (ref 0–0.2)
BASOPHILS NFR BLD: 0.3 % (ref 0–1.9)
BILIRUB SERPL-MCNC: 0.3 MG/DL (ref 0.1–1)
BUN SERPL-MCNC: 27 MG/DL (ref 8–23)
CALCIUM SERPL-MCNC: 9.5 MG/DL (ref 8.7–10.5)
CHLORIDE SERPL-SCNC: 102 MMOL/L (ref 95–110)
CO2 SERPL-SCNC: 29 MMOL/L (ref 23–29)
CREAT SERPL-MCNC: 0.7 MG/DL (ref 0.5–1.4)
DIFFERENTIAL METHOD BLD: ABNORMAL
EOSINOPHIL # BLD AUTO: 0.4 K/UL (ref 0–0.5)
EOSINOPHIL NFR BLD: 6.1 % (ref 0–8)
ERYTHROCYTE [DISTWIDTH] IN BLOOD BY AUTOMATED COUNT: 15.3 % (ref 11.5–14.5)
EST. GFR  (NO RACE VARIABLE): >60 ML/MIN/1.73 M^2
GLUCOSE SERPL-MCNC: 85 MG/DL (ref 70–110)
HCT VFR BLD AUTO: 42.9 % (ref 40–54)
HGB BLD-MCNC: 13.5 G/DL (ref 14–18)
IMM GRANULOCYTES # BLD AUTO: 0.02 K/UL (ref 0–0.04)
IMM GRANULOCYTES NFR BLD AUTO: 0.3 % (ref 0–0.5)
LYMPHOCYTES # BLD AUTO: 1.5 K/UL (ref 1–4.8)
LYMPHOCYTES NFR BLD: 23.4 % (ref 18–48)
MAGNESIUM SERPL-MCNC: 2.3 MG/DL (ref 1.6–2.6)
MCH RBC QN AUTO: 27.4 PG (ref 27–31)
MCHC RBC AUTO-ENTMCNC: 31.5 G/DL (ref 32–36)
MCV RBC AUTO: 87 FL (ref 82–98)
MONOCYTES # BLD AUTO: 0.9 K/UL (ref 0.3–1)
MONOCYTES NFR BLD: 13.8 % (ref 4–15)
NEUTROPHILS # BLD AUTO: 3.7 K/UL (ref 1.8–7.7)
NEUTROPHILS NFR BLD: 56.1 % (ref 38–73)
NRBC BLD-RTO: 0 /100 WBC
OHS QRS DURATION: 86 MS
OHS QTC CALCULATION: 450 MS
PHOSPHATE SERPL-MCNC: 2.5 MG/DL (ref 2.7–4.5)
PLATELET # BLD AUTO: 132 K/UL (ref 150–450)
PMV BLD AUTO: 12.8 FL (ref 9.2–12.9)
POCT GLUCOSE: 114 MG/DL (ref 70–110)
POCT GLUCOSE: 81 MG/DL (ref 70–110)
POCT GLUCOSE: 83 MG/DL (ref 70–110)
POCT GLUCOSE: 93 MG/DL (ref 70–110)
POTASSIUM SERPL-SCNC: 4.2 MMOL/L (ref 3.5–5.1)
PROT SERPL-MCNC: 8.3 G/DL (ref 6–8.4)
RBC # BLD AUTO: 4.93 M/UL (ref 4.6–6.2)
SODIUM SERPL-SCNC: 143 MMOL/L (ref 136–145)
WBC # BLD AUTO: 6.53 K/UL (ref 3.9–12.7)

## 2024-06-25 PROCEDURE — 25000003 PHARM REV CODE 250: Performed by: HOSPITALIST

## 2024-06-25 PROCEDURE — 85025 COMPLETE CBC W/AUTO DIFF WBC: CPT

## 2024-06-25 PROCEDURE — 25000003 PHARM REV CODE 250

## 2024-06-25 PROCEDURE — 63600175 PHARM REV CODE 636 W HCPCS

## 2024-06-25 PROCEDURE — 94761 N-INVAS EAR/PLS OXIMETRY MLT: CPT

## 2024-06-25 PROCEDURE — 83735 ASSAY OF MAGNESIUM: CPT

## 2024-06-25 PROCEDURE — 27000221 HC OXYGEN, UP TO 24 HOURS

## 2024-06-25 PROCEDURE — 84100 ASSAY OF PHOSPHORUS: CPT

## 2024-06-25 PROCEDURE — 80053 COMPREHEN METABOLIC PANEL: CPT

## 2024-06-25 PROCEDURE — 11000001 HC ACUTE MED/SURG PRIVATE ROOM

## 2024-06-25 PROCEDURE — 36415 COLL VENOUS BLD VENIPUNCTURE: CPT

## 2024-06-25 RX ORDER — BISACODYL 10 MG/1
10 SUPPOSITORY RECTAL ONCE
Status: COMPLETED | OUTPATIENT
Start: 2024-06-25 | End: 2024-06-25

## 2024-06-25 RX ORDER — AMOXICILLIN 250 MG
2 CAPSULE ORAL 2 TIMES DAILY
Status: DISCONTINUED | OUTPATIENT
Start: 2024-06-25 | End: 2024-06-27 | Stop reason: HOSPADM

## 2024-06-25 RX ADMIN — POLYETHYLENE GLYCOL 3350 17 G: 17 POWDER, FOR SOLUTION ORAL at 09:06

## 2024-06-25 RX ADMIN — CEFTRIAXONE 1 G: 1 INJECTION, POWDER, FOR SOLUTION INTRAMUSCULAR; INTRAVENOUS at 03:06

## 2024-06-25 RX ADMIN — AMLODIPINE BESYLATE 5 MG: 5 TABLET ORAL at 09:06

## 2024-06-25 RX ADMIN — CARVEDILOL 3.12 MG: 3.12 TABLET, FILM COATED ORAL at 08:06

## 2024-06-25 RX ADMIN — PANTOPRAZOLE SODIUM 40 MG: 40 TABLET, DELAYED RELEASE ORAL at 09:06

## 2024-06-25 RX ADMIN — SENNOSIDES AND DOCUSATE SODIUM 2 TABLET: 50; 8.6 TABLET ORAL at 08:06

## 2024-06-25 RX ADMIN — BISACODYL 10 MG: 10 SUPPOSITORY RECTAL at 05:06

## 2024-06-25 RX ADMIN — SERTRALINE HYDROCHLORIDE 25 MG: 25 TABLET ORAL at 09:06

## 2024-06-25 RX ADMIN — Medication 6 MG: at 08:06

## 2024-06-25 NOTE — PROGRESS NOTES
[facility]  Progress Note    Patient Name: Jono Rose  MRN: [id number]  Patient Class: IP- Inpatient   Admission Date: 6/23/2024  Length of Stay: 1 days  Attending Physician: Summer Santo MD  Primary Care Provider: Andrae Sanchez MD        Subjective:     Principal Problem:Pyelonephritis        HPI:  Jono Rose is a 83 y.o. with pmh of atrial fibrillation not on anticoagulation, hypertension, DM, hyperlipidemia, CHF, CAD, history of recurrent UTIs and urinary retention with suprapubic catheter in place, and dementia presents to the hospital from [facility] with complaints of shortness on breath an unusual breathing pattern due to using accessory muscles per DrBakari From nursing home. However, patient's nurse from the nursing home states that this is a normal breathing pattern for the patient. Patient states that he has some mild abdominal pain. Denies any urinary complaints and has suprapubic catheter in place. States that he had some fever/chills in the nursing home last few days. Patient denies any other alleviating or exacerbating factors. Review of systems is difficult to obtain from patient due to difficulty with speech and dementia.    In the ED:  Patient afebrile without leukocytosis, hemodynamically stable on presentation, hemoglobin 12.6, sodium 147, BUN 26, BNP and troponin levels normal, COVID influenza flu negative, UA with negative nitrites and 3+ leukocyte with many bacteria and >100 WBC, urine culture pending, chest x-ray shows no acute intrathoracic abnormality with mild bibasilar atelectasis.  CTA abdomen pelvis shows (1) marked colonic distention with large volume of fecal material and air throughout the colon and rectum with wall thickening of the rectosigmoid colon adjacent inflammatory change findings are concerning for stercoral proctocolitis (2) circumstantial wall thickening of the urinary bladder presumably on the basis of chronic outlet  obstruction noting marked prostatomegaly with the prostate identifying the bladde and suprapubic catheter in place (3) non bilateral perinephric fat stranding which can be seen with renal dysfunction or infection and correlate with urinalysis advised. Patient given DuoNeb, Rocephin 1 g IV, furosemide 40 mg IV, and brown bomb enema in the ED. Case discussed with ED provider and patient we will be placed under observation for further medical management.    Overview/Hospital Course:  Mr Jono Rose was admitted with UTI and constipation. Started bowel regimen with enema which was successful. Started antibiotics while awaiting urine culture results.     Interval History: He is feeling well today with no specific complaints. Asking for popcorn.     Review of Systems   Respiratory:  Negative for shortness of breath.    Cardiovascular:  Negative for chest pain.   Gastrointestinal:  Negative for abdominal pain.   Genitourinary:  Negative for difficulty urinating.   Psychiatric/Behavioral:  Negative for confusion.      Objective:     Vital Signs (Most Recent):  Temp: 98.2 °F (36.8 °C) (06/25/24 1148)  Pulse: 61 (06/25/24 1148)  Resp: 18 (06/25/24 1148)  BP: (!) 144/67 (06/25/24 1148)  SpO2: 97 % (06/25/24 1148) Vital Signs (24h Range):  Temp:  [97.8 °F (36.6 °C)-98.5 °F (36.9 °C)] 98.2 °F (36.8 °C)  Pulse:  [51-61] 61  Resp:  [17-18] 18  SpO2:  [90 %-100 %] 97 %  BP: (117-146)/(56-88) 144/67     Weight: 90.7 kg (200 lb)  Body mass index is 27.89 kg/m².    Intake/Output Summary (Last 24 hours) at 6/25/2024 1536  Last data filed at 6/25/2024 1200  Gross per 24 hour   Intake 600 ml   Output 700 ml   Net -100 ml         Physical Exam  Vitals and nursing note reviewed.   Constitutional:       General: He is not in acute distress.     Appearance: He is not toxic-appearing.   HENT:      Head: Normocephalic and atraumatic.   Cardiovascular:      Rate and Rhythm: Normal rate and regular rhythm.   Pulmonary:      Effort: Pulmonary  effort is normal.      Breath sounds: Normal breath sounds.      Comments: 1L NC  Abdominal:      General: Bowel sounds are normal. There is distension.      Palpations: There is no mass.      Tenderness: There is no abdominal tenderness. There is no guarding.   Genitourinary:     Comments: Suprapubic cath in place  Musculoskeletal:      Right lower leg: No edema.      Left lower leg: No edema.   Skin:     General: Skin is warm and dry.   Neurological:      Mental Status: He is alert. Mental status is at baseline.      Comments: Oriented to self, location, general situation             Significant Labs: All pertinent labs within the past 24 hours have been reviewed.    Significant Imaging: I have reviewed all pertinent imaging results/findings within the past 24 hours.    Assessment/Plan:      * Pyelonephritis  -Patient presents with generalized abdominal pain and suprapubic catheter in place from [facility]. Patient initially sent to the ER for shortness on breath however on labs more specifically UA showed 3+ leukocyte, negative nitrites, many bacteria, and>100 wbc.   -CT scan with nonspecific bilateral perinephric fat stranding concerning for pyelonephritis with a nonobstructing right renal calculi.    -Started on Rocephin in the ED for acute cystitis/pyelonephritis  - follow up urine culture  - suprapubic cath replaced in ED    Proctocolitis  -Patient presents with generalized abdominal pain, CT abdomen pelvis shows colonic distention with large volume of fecal material and air throughout the colon and rectum with wall thickening of the rectosigmoid colon and adjacent inflammatory change concerning for proctocolitis.  -Patient can not recall his last bowel movement  - Patient was given brown bomb in the ED with a large bowel movement according to the nurse  -Continue scheduled Rx for daily BM    Legally blind  Noted       Paroxysmal atrial fibrillation with RVR  Patient with Paroxysmal (<7 days) atrial  fibrillation which is controlled. Patient is currently in sinus rhythm.[id number] Score: 3. Anticoagulation not indicated due to due to risk of bleeding with gastric pneumatosis .  - does not need telemetry     Anemia of chronic disease  Patient's anemia is currently controlled. Has not received any PRBCs to date. Etiology likely d/t chronic disease due to Chronic Kidney Disease  Current CBC reviewed-   Lab Results   Component Value Date    HGB 13.5 (L) 06/25/2024    HCT 42.9 06/25/2024     Monitor serial CBC and transfuse if patient becomes hemodynamically unstable, symptomatic or H/H drops below 7/21.    Non-occlusive coronary artery disease  No signs of ACS    Chronic heart failure with preserved ejection fraction  Patient with a history of diastolic heart failure.  No evidence of fluid overload or acute decompensation.  Continue home regimen, I's and O's, and daily weights    Results for orders placed during the hospital encounter of 03/20/23    Echo    Interpretation Summary  · The left ventricle is normal in size with concentric hypertrophy and normal systolic function.  · The estimated ejection fraction is 65%.  · Normal left ventricular diastolic function.  · Normal right ventricular size with normal right ventricular systolic function.  · Mild left atrial enlargement.  · Mild right atrial enlargement.  · Mild tricuspid regurgitation.  · TDS - limited views      Essential hypertension  Chronic, controlled. Latest blood pressure and vitals reviewed-     Temp:  [97.8 °F (36.6 °C)-98.5 °F (36.9 °C)]   Pulse:  [59-67]   Resp:  [18-22]   BP: (112-137)/(64-82)   SpO2:  [91 %-100 %] .   Home meds for hypertension were reviewed and noted below.   Hypertension Medications               amLODIPine (NORVASC) 5 MG tablet Take 5 mg by mouth once daily.    carvedilol (COREG) 3.125 MG tablet Take 3.125 mg by mouth 2 (two) times daily.            While in the hospital, will manage blood pressure as follows; Continue home  antihypertensive regimen    Will utilize p.r.n. blood pressure medication only if patient's blood pressure greater than 180/110 and he develops symptoms such as worsening chest pain or shortness of breath.    Hyperlipidemia  Patient currently not on statin, defer to PCP      VTE Risk Mitigation (From admission, onward)           Ordered     Reason for No Pharmacological VTE Prophylaxis  Once        Question:  Reasons:  Answer:  Risk of Bleeding    06/24/24 0415     IP VTE HIGH RISK PATIENT  Once         06/24/24 0415     Place sequential compression device  Until discontinued         06/24/24 0415                    Discharge Planning   HARISH:      Code Status: Full Code   Is the patient medically ready for discharge?:     Reason for patient still in hospital (select all that apply): Laboratory test  Discharge Plan A: Return to nursing home                  Summer Santo MD  Department of [facility] Medicine   [facility]

## 2024-06-25 NOTE — ASSESSMENT & PLAN NOTE
Patient's anemia is currently controlled. Has not received any PRBCs to date. Etiology likely d/t chronic disease due to Chronic Kidney Disease  Current CBC reviewed-   Lab Results   Component Value Date    HGB 13.5 (L) 06/25/2024    HCT 42.9 06/25/2024     Monitor serial CBC and transfuse if patient becomes hemodynamically unstable, symptomatic or H/H drops below 7/21.

## 2024-06-25 NOTE — ASSESSMENT & PLAN NOTE
Patient with Paroxysmal (<7 days) atrial fibrillation which is controlled. Patient is currently in sinus rhythm.[id number] Score: 3. Anticoagulation not indicated due to due to risk of bleeding with gastric pneumatosis .  - does not need telemetry

## 2024-06-25 NOTE — ASSESSMENT & PLAN NOTE
-Patient presents with generalized abdominal pain, CT abdomen pelvis shows colonic distention with large volume of fecal material and air throughout the colon and rectum with wall thickening of the rectosigmoid colon and adjacent inflammatory change concerning for proctocolitis.  -Patient can not recall his last bowel movement  - Patient was given brown bomb in the ED with a large bowel movement according to the nurse  -Continue scheduled Rx for daily BM

## 2024-06-25 NOTE — PLAN OF CARE
06/25/24 1612   Medicare Message   Important Message from Medicare regarding Discharge Appeal Rights Explained to patient/caregiver  (CM explained IMM. Pt's daughter, Brooke verbally expressed understanding. JAMARI emailed a copy to [email or web address].)   Date IMM was signed 06/25/24   Time IMM was signed 1611        Chief Complaint   Patient presents with   â¢ Physical   â¢ Hair/Scalp Problem     rash on scalp that has never gone away, requesting dermatology referral    â¢ Sleep Problem     problems staying asleep. per wife, stops breathing during the night       SUBJECTIVE:  Camila Cruz is a 47year old  male seen today for a physical.  Other concerns:     Patient reports having a concern for sleep apnea. Wife has noted problem with breathing at nighttime. Patient also has rash on the scalp for which he was treated with ketoconazole shampoo in the past. Reports that the symptoms are getting worse now. DIET: Follows regular diet. EXERCISE: walk, treadmill. 3-5 times, 30 min    Immunization History   Administered Date(s) Administered   â¢ Tdap 10/30/2009       Health Maintenance Summary     Topic Due On Due Status Completed On    Colorectal Cancer Screening - Colonoscopy Oct 12, 2027 Not Due Oct 12, 2017    IMMUNIZATION - DTaP/Tdap/Td Oct 30, 2019 Not Due Oct 30, 2009    Khalida Penaloza Sep 1, 2018 Not Due     Depression Screening Jul 24, 2018 Not Due Jul 24, 2017          Outpatient Prescriptions Marked as Taking for the 5/14/18 encounter (Office Visit) with Arthur Lorenz MD   Medication Sig Dispense Refill   â¢ metoPROLOL tartrate (LOPRESSOR) 25 MG tablet Take 1 tablet by mouth every 12 hours. 60 tablet 0   â¢ aspirin 81 MG chewable tablet CHEW 1 TABLET DAILY 90 tablet 1   â¢ atorvastatin (LIPITOR) 40 MG tablet Take 1 tablet by mouth daily. Indications: Ischemic Heart Disease 90 tablet 3   â¢ cholecalciferol (VITAMIN D3) 1000 UNITS tablet Take 2 tablets by mouth daily. â¢ Omega-3 Fatty Acids (FISH OIL) 1000 MG capsule Take 2 g by mouth 2 times daily. â¢ Glucosamine-Chondroit-Vit C-Mn (GLUCOSAMINE-CHONDROITIN) Tab Take by mouth 2 times daily. â¢ Multiple Vitamin (DAILY-VITAMIN PO) Take by mouth daily.           Allergies and past medical, surgical, family and social histories have been "reviewed and updated today and can be viewed elsewhere in the chart. REVIEW OF SYSTEMS:  GENERAL: No fever, chills, or appetite changes. SKIN: No concerning lesions. EYES: No visual problems. ENT: No hearing or nasal concerns. CARDIOVASCULAR:  No chest pain, palpitations and peripheral edema. RESPIRATORY:  No shortness of breath and coughing. GI: No abdominal pain or bowel concerns. : No inguinal hernias or urinary symptoms. MUSCULOSKELETAL: No joint or muscle pain. NEUROLOGIC: No neurologic symptoms. PSYCHOLOGICAL: No depression and anxiety. DEPRESSION SCREEN: Over the past few weeks, have you felt down, depressed or hopeless? no  Over the past few weeks, have you felt little interest or pleasure in doing things? no    OBJECTIVE:  VITAL SIGNS:   Visit Vitals  /72 (BP Location: [facility], Patient Position: Sitting, Cuff Size: Regular)   Pulse 72   Resp 16   Ht 5' 10"" (1.778 m)   Wt 103.9 kg   BMI 32.87 kg/mÂ²     GENERAL: Alert, cooperative, pleasant male in no acute distress. Obese   SKIN: Warm and dry. Normal turgor. No rashes or lesions noted. Rash on scalp and scrotum. Eczematous, scaly on bilateral side on the scalp. EYES: Normal lids and lashes. No conjunctivitis. Sclera anicteric. HENT: Head: Normocephalic, atraumatic. Ears: TM's normal bilaterally. External canals open. Hearing appropriate to conversation. Nose: Bilateral nares patent. No drainage. Mouth: Mucosa pink and moist. No pharyngeal erythema or tonsillar exudate. NECK: Soft, supple and no cervical or supraclavicular lymphadenopathy. No thyromegaly. CARDIOVASCULAR: Regular rate, normal S1, S2. No murmur gallop or rub. No peripheral edema. RESPIRATORY: Lungs clear to auscultation. No wheezes, rhonchi or crackles. GI: Abdomen soft, nontender, nondistended, normoactive bowel sounds. No hepatosplenomegaly or palpable masses. Bladder nonpalpable. : eczematous skin noticed on scrotum.    MUSCULOSKELETAL: Upper and " lower extremities symmetric with equal strength bilaterally. No joint deformities. Spine with normal curvature. NEUROLOGIC: Oriented X3. Cranial nerves intact. Sensation grossly intact throughout. PSYCHOLOGIC: Good eye contact, thoughts connected. Dress and appearance appropriate. Mood and affect normal and congruent. ASSESSMENT:PLAN    1. Routine general medical examination at a health care facility    Labs were done in November and were stable. Discussed life style modification . 30 min of moderate exercise, 5 times per week or 45 min of intensive exercise 3 times per week. Discuss folic acid (596 mg daily ) and calcium ( 1200 mg ) and  Vitamin D (800 IU ) daily   Discuss regular eye exam, dental visits. Self skin checks. SPF > 50 . Self testicular exams. Immunizations as per nurse notes. - CBC & AUTO DIFFERENTIAL; Future  - COMPREHENSIVE METABOLIC PANEL; Future  - LIPID PANEL WITH REFLEX; Future  - THYROID STIMULATING HORMONE REFLEX; Future  - URINALYSIS & REFLEX MICRO WITH CULTURE IF INDICATED; Future  - VITAMIN D -25 HYDROXY; Future    2. History of MI (myocardial infarction)    - metoPROLOL tartrate (LOPRESSOR) 25 MG tablet; Take 1 tablet by mouth every 12 hours. Dispense: 180 tablet; Refill: 3    3. Hyperlipidemia, unspecified hyperlipidemia type    - atorvastatin (LIPITOR) 40 MG tablet; Take 1 tablet by mouth daily. Dispense: 90 tablet; Refill: 3    4. CAD in native artery    - atorvastatin (LIPITOR) 40 MG tablet; Take 1 tablet by mouth daily. Dispense: 90 tablet; Refill: 3    5. Psoriasis of scalp    Prescribing topical steroid for the symptoms. Was also get a second opinion through dermatology.     - SERVICE TO DERMATOLOGY    6. Hematuria, microscopic    - URINALYSIS & REFLEX MICRO WITH CULTURE IF INDICATED; Future    7. Sleep apnea, unspecified type    - SERVICE TO SLEEP MEDICINE      Patient is very stable on the current medications. Will continue with the same.  Recommended cardiology follow-up.  Will see him in 6 months for medication check

## 2024-06-25 NOTE — PLAN OF CARE
Problem: Adult Inpatient Plan of Care  Goal: Plan of Care Review  Outcome: Progressing  Goal: Absence of Hospital-Acquired Illness or Injury  Outcome: Progressing     Problem: Fall Injury Risk  Goal: Absence of Fall and Fall-Related Injury  Outcome: Progressing     Problem: Skin Injury Risk Increased  Goal: Skin Health and Integrity  Outcome: Progressing

## 2024-06-25 NOTE — NURSING
[facility] [place]  Nurses Note -- 4 Eyes      6/25/2024       Skin assessed on: Q Shift      [x] No Pressure Injuries Present    [x]Prevention Measures Documented    [] Yes LDA  for Pressure Injury Previously documented     [] Yes New Pressure Injury Discovered   [] LDA for New Pressure Injury Added      Attending RN:  Shakira Lira RN     Second RN:  [place]

## 2024-06-25 NOTE — HOSPITAL COURSE
Mr Jono Rose was admitted with UTI and constipation. Suprapubic catheter changed in the ED. Started bowel regimen with enema which was successful. Started antibiotics while awaiting urine culture results. Culture with ESBL organism. Changed antibiotics to ertapenem to treat ESBL Ecoli and Proteus which are both resistant to fluoroquinolones. Midline placed to complete 7 days antibiotics. Discharged to nursing home. Follow up with PCP.

## 2024-06-25 NOTE — ASSESSMENT & PLAN NOTE
-Patient presents with generalized abdominal pain and suprapubic catheter in place from [facility]. Patient initially sent to the ER for shortness on breath however on labs more specifically UA showed 3+ leukocyte, negative nitrites, many bacteria, and>100 wbc.   -CT scan with nonspecific bilateral perinephric fat stranding concerning for pyelonephritis with a nonobstructing right renal calculi.    -Started on Rocephin in the ED for acute cystitis/pyelonephritis  - follow up urine culture  - suprapubic cath replaced in ED

## 2024-06-25 NOTE — ASSESSMENT & PLAN NOTE
Patient with a history of diastolic heart failure.  No evidence of fluid overload or acute decompensation.  Continue home regimen, I's and O's, and daily weights    Results for orders placed during the hospital encounter of 03/20/23    Echo    Interpretation Summary  · The left ventricle is normal in size with concentric hypertrophy and normal systolic function.  · The estimated ejection fraction is 65%.  · Normal left ventricular diastolic function.  · Normal right ventricular size with normal right ventricular systolic function.  · Mild left atrial enlargement.  · Mild right atrial enlargement.  · Mild tricuspid regurgitation.  · TDS - limited views

## 2024-06-25 NOTE — SUBJECTIVE & OBJECTIVE
Interval History: He is feeling well today with no specific complaints. Asking for popcorn.     Review of Systems   Respiratory:  Negative for shortness of breath.    Cardiovascular:  Negative for chest pain.   Gastrointestinal:  Negative for abdominal pain.   Genitourinary:  Negative for difficulty urinating.   Psychiatric/Behavioral:  Negative for confusion.      Objective:     Vital Signs (Most Recent):  Temp: 98.2 °F (36.8 °C) (06/25/24 1148)  Pulse: 61 (06/25/24 1148)  Resp: 18 (06/25/24 1148)  BP: (!) 144/67 (06/25/24 1148)  SpO2: 97 % (06/25/24 1148) Vital Signs (24h Range):  Temp:  [97.8 °F (36.6 °C)-98.5 °F (36.9 °C)] 98.2 °F (36.8 °C)  Pulse:  [51-61] 61  Resp:  [17-18] 18  SpO2:  [90 %-100 %] 97 %  BP: (117-146)/(56-88) 144/67     Weight: 90.7 kg (200 lb)  Body mass index is 27.89 kg/m².    Intake/Output Summary (Last 24 hours) at 6/25/2024 1536  Last data filed at 6/25/2024 1200  Gross per 24 hour   Intake 600 ml   Output 700 ml   Net -100 ml         Physical Exam  Vitals and nursing note reviewed.   Constitutional:       General: He is not in acute distress.     Appearance: He is not toxic-appearing.   HENT:      Head: Normocephalic and atraumatic.   Cardiovascular:      Rate and Rhythm: Normal rate and regular rhythm.   Pulmonary:      Effort: Pulmonary effort is normal.      Breath sounds: Normal breath sounds.      Comments: 1L NC  Abdominal:      General: Bowel sounds are normal. There is distension.      Palpations: There is no mass.      Tenderness: There is no abdominal tenderness. There is no guarding.   Genitourinary:     Comments: Suprapubic cath in place  Musculoskeletal:      Right lower leg: No edema.      Left lower leg: No edema.   Skin:     General: Skin is warm and dry.   Neurological:      Mental Status: He is alert. Mental status is at baseline.      Comments: Oriented to self, location, general situation             Significant Labs: All pertinent labs within the past 24 hours have  been reviewed.    Significant Imaging: I have reviewed all pertinent imaging results/findings within the past 24 hours.

## 2024-06-25 NOTE — ASSESSMENT & PLAN NOTE
Patient with a history of urinary retention with suprapubic catheter in place chronically  Urinalysis shows 3+ leukocyte many bacteria, >100 WBC  Started on IV Rocephin empirically for UTI  - follow up Urine culture- pending  - Catheter replaced in the ED

## 2024-06-25 NOTE — PLAN OF CARE
Case Management Assessment     PCP: MD at [facility]  Pharmacy: [facility]    Patient Arrived From: [facility]  Existing Help at Home: Staff at [facility]    Barriers to Discharge: None    Discharge Plan:    A. Return to [facility]   B. Return to [facility]    CM discussed discharge planning with pt. Pt is currently a resident at [facility]. CM spoke to [facility] with [facility]. Pt is able to return. Pt will need ambulance transportation when discharged.      06/25/24 1520   Discharge Assessment   Assessment Type Discharge Planning Assessment   Confirmed/corrected address, phone number and insurance Yes   Confirmed Demographics Correct on Facesheet   Source of Information patient   When was your last doctors appointment?   (unknown)   Communicated HARISH with patient/caregiver Yes   Reason For Admission Pyelonephritis   [facility] facility resident   Facility Arrived From: [facility]   Do you expect to return to your current living situation? Yes   Do you have help at home or someone to help you manage your care at home? Yes   Who are your caregiver(s) and their phone number(s)? Staff at [facility]   Prior to hospitilization cognitive status: Alert/Oriented   Current cognitive status: Alert/Oriented   Walking or Climbing Stairs Difficulty yes   Walking or Climbing Stairs ambulation difficulty, requires equipment;stair climbing difficulty, requires equipment;stair climbing difficulty, dependent   Mobility Management Pt needs max assist to sit up in wheel chair. Unbable to ambulate.   Dressing/Bathing Difficulty yes   Dressing/Bathing bathing difficulty, dependent;bathing difficulty, assistance 1 person   Equipment Currently Used at Home none   Readmission within 30 days? No   Patient currently being followed by outpatient case management? No   Do you currently have service(s) that help you manage your care at home? Yes   Name and Contact number of agency [facility]   Is the pt/caregiver preference to resume services with current agency Yes    Do you take prescription medications? Yes   Do you have prescription coverage? Yes   Coverage MEDICARE - MEDICARE PART A & B   Do you have any problems affording any of your prescribed medications? No   Who is going to help you get home at discharge? Ambulance transportation   How do you get to doctors appointments? health plan transportation   Are you on dialysis? No   Do you take coumadin? No   Discharge Plan A Return to nursing home   Discharge Plan B Return to Nursing Home   DME Needed Upon Discharge  none   Discharge Plan discussed with: Patient   Transition of Care Barriers None   SDOH   (none)   Physical Activity   On average, how many days per week do you engage in moderate to strenuous exercise (like a brisk walk)? 0 days   On average, how many minutes do you engage in exercise at this level? 0 min   Financial Resource Strain   How hard is it for you to pay for the very basics like food, housing, medical care, and heating? Not very   Housing Stability   In the last 12 months, was there a time when you were not able to pay the mortgage or rent on time? N   At any time in the past 12 months, were you homeless or living in a shelter (including now)? N   Transportation Needs   Has the lack of transportation kept you from medical appointments, meetings, work or from getting things needed for daily living? No   Food Insecurity   Within the past 12 months, you worried that your food would run out before you got the money to buy more. Never true   Within the past 12 months, the food you bought just didn't last and you didn't have money to get more. Never true   Stress   Do you feel stress - tense, restless, nervous, or anxious, or unable to sleep at night because your mind is troubled all the time - these days? Not at all   Social Isolation   How often do you feel lonely or isolated from those around you?  Never   Alcohol Use   Q1: How often do you have a drink containing alcohol? Never   Q2: How many drinks  containing alcohol do you have on a typical day when you are drinking? None   Q3: How often do you have six or more drinks on one occasion? Never   Utilities   In the past 12 months has the electric, gas, oil, or water company threatened to shut off services in your home? No   Health Literacy   How often do you need to have someone help you when you read instructions, pamphlets, or other written material from your doctor or pharmacy? Never   OTHER   Name(s) of [facility] staff

## 2024-06-26 LAB
BACTERIA UR CULT: ABNORMAL
POCT GLUCOSE: 107 MG/DL (ref 70–110)
POCT GLUCOSE: 124 MG/DL (ref 70–110)
POCT GLUCOSE: 129 MG/DL (ref 70–110)
POCT GLUCOSE: 64 MG/DL (ref 70–110)
POCT GLUCOSE: 84 MG/DL (ref 70–110)

## 2024-06-26 PROCEDURE — A4216 STERILE WATER/SALINE, 10 ML: HCPCS | Performed by: HOSPITALIST

## 2024-06-26 PROCEDURE — 27000207 HC ISOLATION

## 2024-06-26 PROCEDURE — C1751 CATH, INF, PER/CENT/MIDLINE: HCPCS

## 2024-06-26 PROCEDURE — 25000003 PHARM REV CODE 250: Performed by: HOSPITALIST

## 2024-06-26 PROCEDURE — 36410 VNPNXR 3YR/> PHY/QHP DX/THER: CPT

## 2024-06-26 PROCEDURE — 63600175 PHARM REV CODE 636 W HCPCS: Performed by: HOSPITALIST

## 2024-06-26 PROCEDURE — 25000003 PHARM REV CODE 250

## 2024-06-26 PROCEDURE — 11000001 HC ACUTE MED/SURG PRIVATE ROOM

## 2024-06-26 RX ORDER — AMOXICILLIN 250 MG
2 CAPSULE ORAL 2 TIMES DAILY
Start: 2024-06-26

## 2024-06-26 RX ORDER — SODIUM CHLORIDE 0.9 % (FLUSH) 0.9 %
10 SYRINGE (ML) INJECTION EVERY 6 HOURS
Status: DISCONTINUED | OUTPATIENT
Start: 2024-06-26 | End: 2024-06-27 | Stop reason: HOSPADM

## 2024-06-26 RX ORDER — BISACODYL 5 MG
10 TABLET, DELAYED RELEASE (ENTERIC COATED) ORAL DAILY PRN
Start: 2024-06-26

## 2024-06-26 RX ORDER — SODIUM CHLORIDE 0.9 % (FLUSH) 0.9 %
10 SYRINGE (ML) INJECTION
Status: DISCONTINUED | OUTPATIENT
Start: 2024-06-26 | End: 2024-06-27 | Stop reason: HOSPADM

## 2024-06-26 RX ADMIN — PANTOPRAZOLE SODIUM 40 MG: 40 TABLET, DELAYED RELEASE ORAL at 08:06

## 2024-06-26 RX ADMIN — ERTAPENEM 1 G: 1 INJECTION INTRAMUSCULAR; INTRAVENOUS at 08:06

## 2024-06-26 RX ADMIN — SENNOSIDES AND DOCUSATE SODIUM 2 TABLET: 50; 8.6 TABLET ORAL at 08:06

## 2024-06-26 RX ADMIN — AMLODIPINE BESYLATE 5 MG: 5 TABLET ORAL at 08:06

## 2024-06-26 RX ADMIN — Medication 10 ML: at 06:06

## 2024-06-26 RX ADMIN — SERTRALINE HYDROCHLORIDE 25 MG: 25 TABLET ORAL at 08:06

## 2024-06-26 RX ADMIN — POLYETHYLENE GLYCOL 3350 17 G: 17 POWDER, FOR SOLUTION ORAL at 08:06

## 2024-06-26 RX ADMIN — CARVEDILOL 3.12 MG: 3.12 TABLET, FILM COATED ORAL at 08:06

## 2024-06-26 RX ADMIN — CEFTRIAXONE 2 G: 2 INJECTION, POWDER, FOR SOLUTION INTRAMUSCULAR; INTRAVENOUS at 01:06

## 2024-06-26 RX ADMIN — Medication 6 MG: at 08:06

## 2024-06-26 NOTE — SUBJECTIVE & OBJECTIVE
Interval History: Overall feeling well today. Was puling at [facility] overnight but not currently. Had Bms.     Review of Systems   Respiratory:  Negative for shortness of breath.    Cardiovascular:  Negative for chest pain.   Gastrointestinal:  Negative for abdominal pain.   Genitourinary:  Negative for difficulty urinating.   Psychiatric/Behavioral:  Negative for confusion.      Objective:     Vital Signs (Most Recent):  Temp: 97.6 °F (36.4 °C) (06/26/24 0727)  Pulse: 64 (06/26/24 0932)  Resp: (!) 22 (06/26/24 0727)  BP: (!) 149/82 (06/26/24 0727)  SpO2: 95 % (06/26/24 0727) Vital Signs (24h Range):  Temp:  [97.4 °F (36.3 °C)-98.2 °F (36.8 °C)] 97.6 °F (36.4 °C)  Pulse:  [57-64] 64  Resp:  [18-22] 22  SpO2:  [92 %-97 %] 95 %  BP: (130-149)/(58-82) 149/82     Weight: 90.7 kg (200 lb)  Body mass index is 27.89 kg/m².    Intake/Output Summary (Last 24 hours) at 6/26/2024 1047  Last data filed at 6/26/2024 0601  Gross per 24 hour   Intake 480 ml   Output 1350 ml   Net -870 ml         Physical Exam  Vitals and nursing note reviewed.   Constitutional:       General: He is not in acute distress.     Appearance: He is not toxic-appearing.   HENT:      Head: Normocephalic and atraumatic.   Cardiovascular:      Rate and Rhythm: Normal rate and regular rhythm.   Pulmonary:      Effort: Pulmonary effort is normal.      Breath sounds: Normal breath sounds.      Comments: Room air  Abdominal:      General: Bowel sounds are normal. There is distension.      Palpations: There is no mass.      Tenderness: There is no abdominal tenderness. There is no guarding.   Genitourinary:     Comments: Suprapubic cath in place  Musculoskeletal:      Right lower leg: No edema.      Left lower leg: No edema.   Skin:     General: Skin is warm and dry.   Neurological:      Mental Status: He is alert. Mental status is at baseline.      Comments: Oriented to self, location, general situation             Significant Labs: All pertinent labs within the  past 24 hours have been reviewed.    Significant Imaging: I have reviewed all pertinent imaging results/findings within the past 24 hours.

## 2024-06-26 NOTE — PLAN OF CARE
Problem: Adult Inpatient Plan of Care  Goal: Plan of Care Review  Outcome: Progressing  Goal: Optimal Comfort and Wellbeing  Outcome: Progressing  Goal: Readiness for Transition of Care  Outcome: Progressing     Problem: Fall Injury Risk  Goal: Absence of Fall and Fall-Related Injury  Outcome: Progressing

## 2024-06-26 NOTE — ASSESSMENT & PLAN NOTE
Patient with Paroxysmal (<7 days) atrial fibrillation which is controlled. Patient is currently in sinus rhythm.[id number] Score: 3. Anticoagulation not indicated due to due to risk of bleeding with gastric pneumatosis .  - does not need telemetry

## 2024-06-26 NOTE — ASSESSMENT & PLAN NOTE
Chronic, controlled. Latest blood pressure and vitals reviewed-     Temp:  [97.4 °F (36.3 °C)-98.2 °F (36.8 °C)]   Pulse:  [57-64]   Resp:  [18-22]   BP: (130-149)/(58-82)   SpO2:  [92 %-97 %] .   Home meds for hypertension were reviewed and noted below.   Hypertension Medications               amLODIPine (NORVASC) 5 MG tablet Take 5 mg by mouth once daily.    carvedilol (COREG) 3.125 MG tablet Take 3.125 mg by mouth 2 (two) times daily.            While in the hospital, will manage blood pressure as follows; Continue home antihypertensive regimen    Will utilize p.r.n. blood pressure medication only if patient's blood pressure greater than 180/110 and he develops symptoms such as worsening chest pain or shortness of breath.

## 2024-06-26 NOTE — NURSING
[facility]  Nurses Note -- 4 Eyes      6/25/2024       Skin assessed on: Q Shift      [x] No Pressure Injuries Present    []Prevention Measures Documented    [] Yes LDA  for Pressure Injury Previously documented     [] Yes New Pressure Injury Discovered   [] LDA for New Pressure Injury Added      Attending RN:  Elena Wilhelm RN     Second RN:  Shakira Lira RN

## 2024-06-26 NOTE — PLAN OF CARE
Patient with UTI and constipation. Started bowel regimen with enema which was successful. Started antibiotics while awaiting urine culture results. Culture with ESBL organism. Changed antibiotics to ertapenem while awaiting finalized culture data. Patient might need IV Abx at discharge. Patient is going back to [facility] at discharge.     06/26/24 1135   Discharge Reassessment   Assessment Type Discharge Planning Reassessment   Did the patient's condition or plan change since previous assessment? Yes   Discharge Plan discussed with:   (Dr. Santo)   Communicated HARISH with patient/caregiver Date not available/Unable to determine   Discharge Plan A Return to nursing home   DME Needed Upon Discharge  none   Transition of Care Barriers None   Why the patient remains in the hospital Requires continued medical care   Post-Acute Status   Coverage Medicare   Discharge Delays None known at this time

## 2024-06-26 NOTE — ASSESSMENT & PLAN NOTE
-Patient presents with generalized abdominal pain and suprapubic catheter in place from [facility]. Patient initially sent to the ER for shortness on breath however on labs more specifically UA showed 3+ leukocyte, negative nitrites, many bacteria, and>100 wbc.   -CT scan with nonspecific bilateral perinephric fat stranding concerning for pyelonephritis with a nonobstructing right renal calculi.    -Started on Rocephin in the ED for acute cystitis/pyelonephritis  - culture with ESBL organism- changed to ertapenem while awaiting finalized culture data   - follow up urine culture  - suprapubic cath replaced in ED

## 2024-06-26 NOTE — PROGRESS NOTES
[facility] Medicine  Progress Note    Patient Name: Jono Rose  MRN: [id number]  Patient Class: IP- Inpatient   Admission Date: 6/23/2024  Length of Stay: 2 days  Attending Physician: Summer Santo MD  Primary Care Provider: Andrae Sanchez MD        Subjective:     Principal Problem:Pyelonephritis        HPI:  Jono Rose is a 83 y.o. with pmh of atrial fibrillation not on anticoagulation, hypertension, DM, hyperlipidemia, CHF, CAD, history of recurrent UTIs and urinary retention with suprapubic catheter in place, and dementia presents to the hospital from [facility] with complaints of shortness on breath an unusual breathing pattern due to using accessory muscles per DrBakari From nursing home. However, patient's nurse from the nursing home states that this is a normal breathing pattern for the patient. Patient states that he has some mild abdominal pain. Denies any urinary complaints and has suprapubic catheter in place. States that he had some fever/chills in the nursing home last few days. Patient denies any other alleviating or exacerbating factors. Review of systems is difficult to obtain from patient due to difficulty with speech and dementia.    In the ED:  Patient afebrile without leukocytosis, hemodynamically stable on presentation, hemoglobin 12.6, sodium 147, BUN 26, BNP and troponin levels normal, COVID influenza flu negative, UA with negative nitrites and 3+ leukocyte with many bacteria and >100 WBC, urine culture pending, chest x-ray shows no acute intrathoracic abnormality with mild bibasilar atelectasis.  CTA abdomen pelvis shows (1) marked colonic distention with large volume of fecal material and air throughout the colon and rectum with wall thickening of the rectosigmoid colon adjacent inflammatory change findings are concerning for stercoral proctocolitis (2) circumstantial wall thickening of the urinary bladder presumably on the basis of chronic outlet  obstruction noting marked prostatomegaly with the prostate identifying the bladde and suprapubic catheter in place (3) non bilateral perinephric fat stranding which can be seen with renal dysfunction or infection and correlate with urinalysis advised. Patient given DuoNeb, Rocephin 1 g IV, furosemide 40 mg IV, and brown bomb enema in the ED. Case discussed with ED provider and patient we will be placed under observation for further medical management.    Overview/Hospital Course:  Mr Jono Rose was admitted with UTI and constipation. Started bowel regimen with enema which was successful. Started antibiotics while awaiting urine culture results. Culture with ESBL organism. Changed antibiotics to ertapenem while awaiting finalized culture data.     Interval History: Overall feeling well today. Was puling at [facility] overnight but not currently. Had Bms.     Review of Systems   Respiratory:  Negative for shortness of breath.    Cardiovascular:  Negative for chest pain.   Gastrointestinal:  Negative for abdominal pain.   Genitourinary:  Negative for difficulty urinating.   Psychiatric/Behavioral:  Negative for confusion.      Objective:     Vital Signs (Most Recent):  Temp: 97.6 °F (36.4 °C) (06/26/24 0727)  Pulse: 64 (06/26/24 0932)  Resp: (!) 22 (06/26/24 0727)  BP: (!) 149/82 (06/26/24 0727)  SpO2: 95 % (06/26/24 0727) Vital Signs (24h Range):  Temp:  [97.4 °F (36.3 °C)-98.2 °F (36.8 °C)] 97.6 °F (36.4 °C)  Pulse:  [57-64] 64  Resp:  [18-22] 22  SpO2:  [92 %-97 %] 95 %  BP: (130-149)/(58-82) 149/82     Weight: 90.7 kg (200 lb)  Body mass index is 27.89 kg/m².    Intake/Output Summary (Last 24 hours) at 6/26/2024 1047  Last data filed at 6/26/2024 0601  Gross per 24 hour   Intake 480 ml   Output 1350 ml   Net -870 ml         Physical Exam  Vitals and nursing note reviewed.   Constitutional:       General: He is not in acute distress.     Appearance: He is not toxic-appearing.   HENT:      Head: Normocephalic and  atraumatic.   Cardiovascular:      Rate and Rhythm: Normal rate and regular rhythm.   Pulmonary:      Effort: Pulmonary effort is normal.      Breath sounds: Normal breath sounds.      Comments: Room air  Abdominal:      General: Bowel sounds are normal. There is distension.      Palpations: There is no mass.      Tenderness: There is no abdominal tenderness. There is no guarding.   Genitourinary:     Comments: Suprapubic cath in place  Musculoskeletal:      Right lower leg: No edema.      Left lower leg: No edema.   Skin:     General: Skin is warm and dry.   Neurological:      Mental Status: He is alert. Mental status is at baseline.      Comments: Oriented to self, location, general situation             Significant Labs: All pertinent labs within the past 24 hours have been reviewed.    Significant Imaging: I have reviewed all pertinent imaging results/findings within the past 24 hours.    Assessment/Plan:      * Pyelonephritis  -Patient presents with generalized abdominal pain and suprapubic catheter in place from [facility]. Patient initially sent to the ER for shortness on breath however on labs more specifically UA showed 3+ leukocyte, negative nitrites, many bacteria, and>100 wbc.   -CT scan with nonspecific bilateral perinephric fat stranding concerning for pyelonephritis with a nonobstructing right renal calculi.    -Started on Rocephin in the ED for acute cystitis/pyelonephritis  - culture with ESBL organism- changed to ertapenem while awaiting finalized culture data   - follow up urine culture  - suprapubic cath replaced in ED    Proctocolitis  -Patient presents with generalized abdominal pain, CT abdomen pelvis shows colonic distention with large volume of fecal material and air throughout the colon and rectum with wall thickening of the rectosigmoid colon and adjacent inflammatory change concerning for proctocolitis.  -Patient can not recall his last bowel movement  - Patient was given brown  bomb in the ED with a large bowel movement according to the nurse  -Continue scheduled Rx for daily BM    Legally blind  Noted       Paroxysmal atrial fibrillation with RVR  Patient with Paroxysmal (<7 days) atrial fibrillation which is controlled. Patient is currently in sinus rhythm.[id number] Score: 3. Anticoagulation not indicated due to due to risk of bleeding with gastric pneumatosis .  - does not need telemetry     Anemia of chronic disease  Patient's anemia is currently controlled. Has not received any PRBCs to date. Etiology likely d/t chronic disease due to Chronic Kidney Disease  Current CBC reviewed-   Lab Results   Component Value Date    HGB 13.5 (L) 06/25/2024    HCT 42.9 06/25/2024     Monitor serial CBC and transfuse if patient becomes hemodynamically unstable, symptomatic or H/H drops below 7/21.    Non-occlusive coronary artery disease  No signs of ACS    Chronic heart failure with preserved ejection fraction  Patient with a history of diastolic heart failure.  No evidence of fluid overload or acute decompensation.  Continue home regimen, I's and O's, and daily weights    Results for orders placed during the hospital encounter of 03/20/23    Echo    Interpretation Summary  · The left ventricle is normal in size with concentric hypertrophy and normal systolic function.  · The estimated ejection fraction is 65%.  · Normal left ventricular diastolic function.  · Normal right ventricular size with normal right ventricular systolic function.  · Mild left atrial enlargement.  · Mild right atrial enlargement.  · Mild tricuspid regurgitation.  · TDS - limited views      Essential hypertension  Chronic, controlled. Latest blood pressure and vitals reviewed-     Temp:  [97.4 °F (36.3 °C)-98.2 °F (36.8 °C)]   Pulse:  [57-64]   Resp:  [18-22]   BP: (130-149)/(58-82)   SpO2:  [92 %-97 %] .   Home meds for hypertension were reviewed and noted below.   Hypertension Medications               amLODIPine (NORVASC)  5 MG tablet Take 5 mg by mouth once daily.    carvedilol (COREG) 3.125 MG tablet Take 3.125 mg by mouth 2 (two) times daily.            While in the hospital, will manage blood pressure as follows; Continue home antihypertensive regimen    Will utilize p.r.n. blood pressure medication only if patient's blood pressure greater than 180/110 and he develops symptoms such as worsening chest pain or shortness of breath.    Hyperlipidemia  Patient currently not on statin, defer to PCP      VTE Risk Mitigation (From admission, onward)           Ordered     Reason for No Pharmacological VTE Prophylaxis  Once        Question:  Reasons:  Answer:  Risk of Bleeding    06/24/24 0415     IP VTE HIGH RISK PATIENT  Once         06/24/24 0415     Place sequential compression device  Until discontinued         06/24/24 0415                    Discharge Planning   HARISH: 6/26/2024     Code Status: Full Code   Is the patient medically ready for discharge?:     Reason for patient still in hospital (select all that apply): Patient trending condition  Discharge Plan A: Return to nursing home          2:38 PM called micro lab about pending susceptibilities. Both organisms sensitive to ertapenem, resistant to fluoroquinolones. Will need 7 days ertapenem upon discharge. Will place midline. Hopeful DC tomorrow        Summer Santo MD  Department of Hospital Medicine   [facility]

## 2024-06-26 NOTE — PLAN OF CARE
[facility]            NURSING HOME ORDERS    06/27/2024    Admit to Nursing Home:  Skiled Bed      Diagnoses:  Active Hospital Problems    Diagnosis  POA    *Pyelonephritis [N12]  Yes    Proctocolitis [K52.9]  Yes    Legally blind [H54.8]  Yes     Chronic    Paroxysmal atrial fibrillation with RVR [I48.0]  Yes    Anemia of chronic disease [D63.8]  Yes     Chronic    Non-occlusive coronary artery disease [I25.10]  Yes     Chronic     Non-obstructive CAD [facility] 09/2017 shows LM, LAD, LCX, RCA are normal.      Chronic heart failure with preserved ejection fraction [I50.32]  Yes     Chronic    Essential hypertension [I10]  Yes     Chronic    Hyperlipidemia [E78.5]  Yes     Chronic      Resolved Hospital Problems    Diagnosis Date Resolved POA    Shortness of breath [R06.02] 06/25/2024 Yes    UTI (urinary tract infection) due to urinary indwelling Vargas catheter [T83.511A, N39.0] 06/25/2024 Yes    UTI (urinary tract infection) [N39.0] 06/25/2024 Yes       Patient is homebound due to:  Pyelonephritis    Allergies:Review of patient's allergies indicates:  No Known Allergies    Vitals:  Daily    Diet: soft and bite sized   Supplement:  1 can every three times a day with meals                         Type:  House     Acitivities:     - Up in a chair each morning as tolerated      LABS:  Per facility protocol    Nursing Precautions:     - Aspiration precautions:             - Total assistance with meals            -  Upright 90 degrees befor during and after meals             -  Suction at bedside          - Fall precautions per nursing home protocol   - Seizure precaution per senior care protocol   - Decubitus precautions:        -  for positioning   - Pressure reducing foam mattress   - Turn patient every two hours. Use wedge pillows to anchor patient      MISCELLANEOUS CARE:       Suprapubic catheter Care: Empty bag every shift.  Change every month     Routine Skin for Bedridden Patients:  Apply  moisture barrier cream to all    skin folds and wet areas in perineal area daily and after baths and                           all bowel movements.    Home Infusion Therapy:   SN to perform Infusion Therapy/Central Line Care.  Review Central Line Care & Central Line Flush with patient.    Administer (drug and dose): ertapenem 1g daily    Last dose given: 6/27/24                           Home dose due: 6/28/24  Last dose due: 7/3/24    Scrub the Hub: Prior to accessing the line, always perform a 30 second alcohol scrub  Each lumen of the central line is to be flushed at least daily with 10 mL Normal Saline and 3 mL Heparin flush (10 units/mL)  Skilled Nurse (SN) may draw blood from IV access  Blood Draw Procedure:   - Aspirate at least 5 mL of blood   - Discard   - Obtain specimen   - Change injection cap   - Flush with 20 mL Normal Saline followed by a                 3-5 mL Heparin flush (10 units/mL)  Central :   - Sterile dressing changes are done weekly and as needed.   - Use chlor-hexadine scrub to cleanse site, apply Biopatch to insertion site,       apply securement device dressing   - Injection caps are changed weekly and after EVERY lab draw.   - If sterile gauze is under dressing to control oozing,                 dressing change must be performed every 24 hours until gauze is not needed.    Labs not needed  Remove midline after last dose IV antibiotics       Medications: Discontinue all previous medication orders, if any. See new list below.       Medication List        START taking these medications      0.9% NaCl PgBk 100 mL with ertapenem 1 gram SolR 1 g  Inject 1 g into the vein once daily. for 5 days  Start taking on: June 28, 2024     bisacodyL 5 mg EC tablet  Commonly known as: DULCOLAX  Take 2 tablets (10 mg total) by mouth daily as needed for Constipation.     senna-docusate 8.6-50 mg 8.6-50 mg per tablet  Commonly known as: PERICOLACE  Take 2 tablets by mouth 2 (two) times  daily.            CONTINUE taking these medications      acetaminophen 325 MG tablet  Commonly known as: TYLENOL  Take 325 mg by mouth every 6 (six) hours as needed for Pain.     amLODIPine 5 MG tablet  Commonly known as: NORVASC  Take 5 mg by mouth once daily.     carvediloL 3.125 MG tablet  Commonly known as: COREG  Take 3.125 mg by mouth 2 (two) times daily.     dorzolamide-timolol 2-0.5% 22.3-6.8 mg/mL ophthalmic solution  Commonly known as: COSOPT  Place 1 drop into both eyes 2 (two) times daily.     melatonin 3 mg tablet  Commonly known as: MELATIN  Take 6 mg by mouth nightly as needed for Insomnia.     multivitamin with minerals tablet  Take 1 tablet by mouth once daily.     pantoprazole 40 MG tablet  Commonly known as: PROTONIX  Take 1 tablet (40 mg total) by mouth once daily.     potassium chloride SA 10 MEQ tablet  Commonly known as: K-DUR,KLOR-CON M  Take 1 tablet (10 mEq total) by mouth once daily.     sertraline 25 MG tablet  Commonly known as: ZOLOFT  Take 25 mg by mouth once daily.     traZODone 150 MG tablet  Commonly known as: DESYREL  Take 150 mg by mouth every evening.            STOP taking these medications      AMINO ACIDS-PROTEIN HYDROLYS ORAL     amoxicillin-clavulanate 875-125mg 875-125 mg per tablet  Commonly known as: AUGMENTIN     cyproheptadine 4 mg tablet  Commonly known as: PERIACTIN     ferrous sulfate 325 (65 FE) MG EC tablet     nutritional supplements Powd                      _________________________________  Summer Santo MD  06/27/2024

## 2024-06-26 NOTE — NURSING
During hourly rounds, nurse saw patient holding his suprapubic catheter. Upon inspection, it was pulled a little bit but no bleeding. Lupis Arora informed and ordered to put a tape temporarily so to hold it in place and inform daytime provider. Orders carried out.    Patient also had large bowel movement tonight.

## 2024-06-26 NOTE — NURSING
[facility]  Nurses Note -- 4 Eyes      6/26/2024       Skin assessed on: Q Shift      [x] No Pressure Injuries Present    [x]Prevention Measures Documented    [] Yes LDA  for Pressure Injury Previously documented     [] Yes New Pressure Injury Discovered   [] LDA for New Pressure Injury Added      Attending RN:  Esme Hayward RN     Second RN:  Elena RIZZO RN

## 2024-06-26 NOTE — ASSESSMENT & PLAN NOTE
Patient with a history of diastolic heart failure.  No evidence of fluid overload or acute decompensation.  Continue home regimen, I's and O's, and daily weights    Results for orders placed during the hospital encounter of 03/20/23    Echo    Interpretation Summary  · The left ventricle is normal in size with concentric hypertrophy and normal systolic function.  · The estimated ejection fraction is 65%.  · Normal left ventricular diastolic function.  · Normal right ventricular size with normal right ventricular systolic function.  · Mild left atrial enlargement.  · Mild right atrial enlargement.  · Mild tricuspid regurgitation.  · TDS - limited views     (3) slightly limited

## 2024-06-27 VITALS
DIASTOLIC BLOOD PRESSURE: 73 MMHG | OXYGEN SATURATION: 95 % | RESPIRATION RATE: 18 BRPM | HEART RATE: 60 BPM | SYSTOLIC BLOOD PRESSURE: 143 MMHG | BODY MASS INDEX: 28 KG/M2 | TEMPERATURE: 98 F | WEIGHT: 200 LBS | HEIGHT: 71 IN

## 2024-06-27 LAB
POCT GLUCOSE: 106 MG/DL (ref 70–110)
POCT GLUCOSE: 81 MG/DL (ref 70–110)

## 2024-06-27 PROCEDURE — 63600175 PHARM REV CODE 636 W HCPCS: Performed by: HOSPITALIST

## 2024-06-27 PROCEDURE — 25000003 PHARM REV CODE 250: Performed by: HOSPITALIST

## 2024-06-27 PROCEDURE — A4216 STERILE WATER/SALINE, 10 ML: HCPCS | Performed by: HOSPITALIST

## 2024-06-27 PROCEDURE — 25000003 PHARM REV CODE 250

## 2024-06-27 RX ADMIN — SERTRALINE HYDROCHLORIDE 25 MG: 25 TABLET ORAL at 08:06

## 2024-06-27 RX ADMIN — POLYETHYLENE GLYCOL 3350 17 G: 17 POWDER, FOR SOLUTION ORAL at 08:06

## 2024-06-27 RX ADMIN — Medication 10 ML: at 06:06

## 2024-06-27 RX ADMIN — SENNOSIDES AND DOCUSATE SODIUM 2 TABLET: 50; 8.6 TABLET ORAL at 08:06

## 2024-06-27 RX ADMIN — CARVEDILOL 3.12 MG: 3.12 TABLET, FILM COATED ORAL at 08:06

## 2024-06-27 RX ADMIN — Medication 10 ML: at 01:06

## 2024-06-27 RX ADMIN — Medication 10 ML: at 12:06

## 2024-06-27 RX ADMIN — AMLODIPINE BESYLATE 5 MG: 5 TABLET ORAL at 08:06

## 2024-06-27 RX ADMIN — ERTAPENEM 1 G: 1 INJECTION INTRAMUSCULAR; INTRAVENOUS at 08:06

## 2024-06-27 RX ADMIN — PANTOPRAZOLE SODIUM 40 MG: 40 TABLET, DELAYED RELEASE ORAL at 08:06

## 2024-06-27 NOTE — DISCHARGE SUMMARY
[facility] Medicine  Discharge Summary      Patient Name: Jono Rose  MRN: [id number]  [facility]: [id number]  Patient Class: IP- Inpatient  Admission Date: 6/23/2024  Hospital Length of Stay: 3 days  Discharge Date and Time:  06/27/2024 10:04 AM  Attending Physician: Summer Santo MD   Discharging Provider: Summer Santo MD  Primary Care Provider: Andrae Sanchez MD    Primary Care Team: Networked reference to record PCT     HPI:   Jono Rose is a 83 y.o. with pmh of atrial fibrillation not on anticoagulation, hypertension, DM, hyperlipidemia, CHF, CAD, history of recurrent UTIs and urinary retention with suprapubic catheter in place, and dementia presents to the hospital from [facility] with complaints of shortness on breath an unusual breathing pattern due to using accessory muscles per Dr. From nursing home. However, patient's nurse from the nursing home states that this is a normal breathing pattern for the patient. Patient states that he has some mild abdominal pain. Denies any urinary complaints and has suprapubic catheter in place. States that he had some fever/chills in the nursing home last few days. Patient denies any other alleviating or exacerbating factors. Review of systems is difficult to obtain from patient due to difficulty with speech and dementia.    In the ED:  Patient afebrile without leukocytosis, hemodynamically stable on presentation, hemoglobin 12.6, sodium 147, BUN 26, BNP and troponin levels normal, COVID influenza flu negative, UA with negative nitrites and 3+ leukocyte with many bacteria and >100 WBC, urine culture pending, chest x-ray shows no acute intrathoracic abnormality with mild bibasilar atelectasis.  CTA abdomen pelvis shows (1) marked colonic distention with large volume of fecal material and air throughout the colon and rectum with wall thickening of the rectosigmoid colon adjacent inflammatory change findings are concerning for stercoral  proctocolitis (2) circumstantial wall thickening of the urinary bladder presumably on the basis of chronic outlet obstruction noting marked prostatomegaly with the prostate identifying the bladde and suprapubic catheter in place (3) non bilateral perinephric fat stranding which can be seen with renal dysfunction or infection and correlate with urinalysis advised. Patient given DuoNeb, Rocephin 1 g IV, furosemide 40 mg IV, and brown bomb enema in the ED. Case discussed with ED provider and patient we will be placed under observation for further medical management.    * No surgery found *      Hospital Course:   Mr Jono Rose was admitted with UTI and constipation. Suprapubic catheter changed in the ED. Started bowel regimen with enema which was successful. Started antibiotics while awaiting urine culture results. Culture with ESBL organism. Changed antibiotics to ertapenem to treat ESBL Ecoli and Proteus which are both resistant to fluoroquinolones. Midline placed to complete 7 days antibiotics. Discharged to nursing home. Follow up with PCP.      Goals of Care Treatment Preferences:  Code Status: Full Code    Living Will: Yes  La[place]: Yes           Consults:   Consults (From admission, onward)          Status Ordering Provider     Inpatient consult to Social Work  Once        Provider:  (Not yet assigned)    Acknowledged DEBORAH ARCHULETA     Inpatient consult to Midline team  Once        Provider:  (Not yet assigned)    Acknowledged DEBORAH ARCHULETA            No new Assessment & Plan notes have been filed under this hospital service since the last note was generated.  Service: Hospital Medicine    Final Active Diagnoses:    Diagnosis Date Noted POA    PRINCIPAL PROBLEM:  Pyelonephritis [N12] 06/24/2024 Yes    Proctocolitis [K52.9] 06/24/2024 Yes    Legally blind [H54.8] 03/21/2023 Yes     Chronic    Paroxysmal atrial fibrillation with RVR [I48.0] 02/26/2018 Yes    Anemia of chronic disease [D63.8] 12/05/2017  Yes     Chronic    Non-occlusive coronary artery disease [I25.10] 10/04/2017 Yes     Chronic    Chronic heart failure with preserved ejection fraction [I50.32] 06/30/2016 Yes     Chronic    Essential hypertension [I10] 09/05/2014 Yes     Chronic    Hyperlipidemia [E78.5] 06/30/2014 Yes     Chronic      Problems Resolved During this Admission:    Diagnosis Date Noted Date Resolved POA    Shortness of breath [R06.02] 06/24/2024 06/25/2024 Yes    UTI (urinary tract infection) due to urinary indwelling Vargas catheter [T83.511A, N39.0] 12/26/2020 06/25/2024 Yes    UTI (urinary tract infection) [N39.0] 12/05/2017 06/25/2024 Yes       Discharged Condition: good    Disposition: halfway Nursing Facili*    Follow Up:   Follow-up Information       Andrae Sanchez MD In 1 day.    Specialty: Internal Medicine  Contact information:  [address] [place] [address] [address]  [facility] [id number] [phone]               [facility] [place] - Emergency Dept.    Specialty: Emergency Medicine  Why: As needed, If symptoms worsen  Contact information:  [address] [facility] [place] [address] [phone]                         Patient Instructions:   No discharge procedures on file.    Significant Diagnostic Studies: N/A    Pending Diagnostic Studies:       None           Medications:  Reconciled Home Medications:      Medication List        START taking these medications      0.9% NaCl PgBk 100 mL with ertapenem 1 gram SolR 1 g  Inject 1 g into the vein once daily. for 5 days  Start taking on: June 28, 2024     bisacodyL 5 mg EC tablet  Commonly known as: DULCOLAX  Take 2 tablets (10 mg total) by mouth daily as needed for Constipation.     senna-docusate 8.6-50 mg 8.6-50 mg per tablet  Commonly known as: PERICOLACE  Take 2 tablets by mouth 2 (two) times daily.            CONTINUE taking these medications      acetaminophen 325 MG tablet  Commonly known as: TYLENOL  Take 325 mg by mouth every 6  (six) hours as needed for Pain.     amLODIPine 5 MG tablet  Commonly known as: NORVASC  Take 5 mg by mouth once daily.     carvediloL 3.125 MG tablet  Commonly known as: COREG  Take 3.125 mg by mouth 2 (two) times daily.     dorzolamide-timolol 2-0.5% 22.3-6.8 mg/mL ophthalmic solution  Commonly known as: COSOPT  Place 1 drop into both eyes 2 (two) times daily.     melatonin 3 mg tablet  Commonly known as: MELATIN  Take 6 mg by mouth nightly as needed for Insomnia.     multivitamin with minerals tablet  Take 1 tablet by mouth once daily.     pantoprazole 40 MG tablet  Commonly known as: PROTONIX  Take 1 tablet (40 mg total) by mouth once daily.     potassium chloride SA 10 MEQ tablet  Commonly known as: K-DUR,KLOR-CON M  Take 1 tablet (10 mEq total) by mouth once daily.     sertraline 25 MG tablet  Commonly known as: ZOLOFT  Take 25 mg by mouth once daily.     traZODone 150 MG tablet  Commonly known as: DESYREL  Take 150 mg by mouth every evening.            STOP taking these medications      AMINO ACIDS-PROTEIN HYDROLYS ORAL     amoxicillin-clavulanate 875-125mg 875-125 mg per tablet  Commonly known as: AUGMENTIN     cyproheptadine 4 mg tablet  Commonly known as: PERIACTIN     ferrous sulfate 325 (65 FE) MG EC tablet     nutritional supplements Powd              Indwelling Lines/Drains at time of discharge:   Lines/Drains/Airways       Drain  Duration                  Suprapubic Catheter 07/10/23 0234 silver hydrogel antimicrobial coated 20 Fr. 353 days                    Time spent on the discharge of patient: 35 minutes         Summer Santo MD  Department of Hospital Medicine  [facility] - Critical access hospital

## 2024-06-27 NOTE — PLAN OF CARE
Problem: Adult Inpatient Plan of Care  Goal: Optimal Comfort and Wellbeing  Outcome: Progressing  Goal: Readiness for Transition of Care  Outcome: Progressing     Problem: Fall Injury Risk  Goal: Absence of Fall and Fall-Related Injury  Outcome: Progressing     Problem: Skin Injury Risk Increased  Goal: Skin Health and Integrity  Outcome: Progressing

## 2024-06-27 NOTE — NURSING
[facility] [place]  Nurses Note -- 4 Eyes      6/26/2024       Skin assessed on: Q Shift      [x] No Pressure Injuries Present    []Prevention Measures Documented    [] Yes LDA  for Pressure Injury Previously documented     [] Yes New Pressure Injury Discovered   [] LDA for New Pressure Injury Added      Attending RN:  Elena Wilhelm, RN     Second RN:  Esme Hayward RN        PER handoff given by Esme DUVAL     Pt resting in bed quietly. NAD noted. No c/o pain.     Fall and safety precautions maintained. Bed alarm activated and audible.. Bed locked in lowest position, with side rails up x2. Call bell and personal items within reach.

## 2024-06-27 NOTE — NURSING
[facility] [place]  Nurses Note -- 4 Eyes      6/27/2024       Skin assessed on: Q Shift      [x] No Pressure Injuries Present    [x]Prevention Measures Documented    [] Yes LDA  for Pressure Injury Previously documented     [] Yes New Pressure Injury Discovered   [] LDA for New Pressure Injury Added      Attending RN:  Esme Hayward RN     Second RN:  Elena RIZZO RN        Patient calling  She states she is a diabetic but she needs a machine, test strips and lancets sent to [facility] in [facility] to call and lm [phone]

## 2024-06-27 NOTE — PLAN OF CARE
Case Management Final Discharge Note    Discharge Disposition: [facility]    New DME ordered / company name: None    Relevant SDOH / Transition of Care Barriers:  None    Primary Caretaker and contact information: Brooke daughter [phone]    Scheduled followup appointment: Pt will follow up with staff MD at [place].    Referrals placed: None    Transportation: [facility]    I provided the patient a choice of post acute providers and offered a list of CMS rated prison Nursing Home.     Patient/family chose the following as their preferred providers:  [facility]     Patient and family educated on discharge services and updated on DC plan. Bedside RN notified, patient clear to discharge from Case Management Perspective.       06/27/24 1133   Final Note   Assessment Type Final Discharge Note   Anticipated Discharge Disposition [facility]   What phone number can be called within the next 1-3 days to see how you are doing after discharge? [phone]   Post-Acute Status   Coverage Medicare AB   Patient choice form signed by patient/caregiver List from [facility] Post-Acute Care   Discharge Delays (!) [facility] Arranged Transportation

## 2024-06-27 NOTE — PLAN OF CARE
ADT 30 order placed for [employer].  Requested  time: 1:00 pm.    If transportation does not arrive at ETA time nurse will be instructed to follow protocol for transportation below:  How can I get in touch directly with dispatch, if needed?                 Non-emergent (stretcher): [phone]  option 6

## 2024-09-23 PROBLEM — N12 PYELONEPHRITIS: Status: RESOLVED | Noted: 2024-06-24 | Resolved: 2024-09-23

## 2024-09-25 ENCOUNTER — TELEPHONE (OUTPATIENT)
Dept: UROLOGY | Facility: CLINIC | Age: 84
End: 2024-09-25
Payer: MEDICARE

## 2024-09-25 NOTE — TELEPHONE ENCOUNTER
Called [facility] spoke with Ms. Grijalva to schedule patient for clinic visit ER f/u.  Ms. Grijalva stated she will get back with me, she needed to find out some information because patient has an urologist at the [facility]. Once she find out where the patient need to go she will then call to schedule the appt.  She appreciated me calling. Call ended.  DECLAN LOMELI

## 2025-01-01 ENCOUNTER — HOSPITAL ENCOUNTER (INPATIENT)
Facility: HOSPITAL | Age: 85
LOS: 5 days | DRG: 698 | End: 2025-04-28
Attending: EMERGENCY MEDICINE
Payer: MEDICARE

## 2025-01-01 VITALS
TEMPERATURE: 98 F | HEART RATE: 90 BPM | WEIGHT: 143.75 LBS | HEIGHT: 71 IN | RESPIRATION RATE: 21 BRPM | OXYGEN SATURATION: 93 % | DIASTOLIC BLOOD PRESSURE: 85 MMHG | BODY MASS INDEX: 20.12 KG/M2 | SYSTOLIC BLOOD PRESSURE: 150 MMHG

## 2025-01-01 DIAGNOSIS — N39.0 URINARY TRACT INFECTION WITH HEMATURIA, SITE UNSPECIFIED: ICD-10-CM

## 2025-01-01 DIAGNOSIS — R53.1 GENERALIZED WEAKNESS: ICD-10-CM

## 2025-01-01 DIAGNOSIS — R07.9 CHEST PAIN: ICD-10-CM

## 2025-01-01 DIAGNOSIS — N39.0 COMPLICATED URINARY TRACT INFECTION: ICD-10-CM

## 2025-01-01 DIAGNOSIS — B99.9 INFECTIOUS ENCEPHALOPATHY: ICD-10-CM

## 2025-01-01 DIAGNOSIS — G93.49 INFECTIOUS ENCEPHALOPATHY: ICD-10-CM

## 2025-01-01 DIAGNOSIS — A41.9 SEPSIS, DUE TO UNSPECIFIED ORGANISM, UNSPECIFIED WHETHER ACUTE ORGAN DYSFUNCTION PRESENT: Primary | ICD-10-CM

## 2025-01-01 DIAGNOSIS — R31.9 URINARY TRACT INFECTION WITH HEMATURIA, SITE UNSPECIFIED: ICD-10-CM

## 2025-01-01 LAB
ABSOLUTE EOSINOPHIL (OHS): 0.01 K/UL
ABSOLUTE EOSINOPHIL (OHS): 0.04 K/UL
ABSOLUTE EOSINOPHIL (OHS): 0.05 K/UL
ABSOLUTE EOSINOPHIL (OHS): 0.07 K/UL
ABSOLUTE MONOCYTE (OHS): 0.97 K/UL (ref 0.3–1)
ABSOLUTE MONOCYTE (OHS): 1.13 K/UL (ref 0.3–1)
ABSOLUTE MONOCYTE (OHS): 1.37 K/UL (ref 0.3–1)
ABSOLUTE MONOCYTE (OHS): 1.81 K/UL (ref 0.3–1)
ABSOLUTE NEUTROPHIL COUNT (OHS): 5.54 K/UL (ref 1.8–7.7)
ABSOLUTE NEUTROPHIL COUNT (OHS): 6.51 K/UL (ref 1.8–7.7)
ABSOLUTE NEUTROPHIL COUNT (OHS): 8.81 K/UL (ref 1.8–7.7)
ABSOLUTE NEUTROPHIL COUNT (OHS): 8.91 K/UL (ref 1.8–7.7)
AFP SERPL-MCNC: 2.5 NG/ML
ALBUMIN SERPL BCP-MCNC: 2.8 G/DL (ref 3.5–5.2)
ALBUMIN SERPL BCP-MCNC: 2.9 G/DL (ref 3.5–5.2)
ALBUMIN SERPL BCP-MCNC: 3 G/DL (ref 3.5–5.2)
ALLENS TEST: ABNORMAL
ALLENS TEST: ABNORMAL
ALP SERPL-CCNC: 112 UNIT/L (ref 40–150)
ALP SERPL-CCNC: 112 UNIT/L (ref 40–150)
ALP SERPL-CCNC: 96 UNIT/L (ref 40–150)
ALT SERPL W/O P-5'-P-CCNC: 5 UNIT/L (ref 10–44)
ALT SERPL W/O P-5'-P-CCNC: 6 UNIT/L (ref 10–44)
ALT SERPL W/O P-5'-P-CCNC: <5 UNIT/L (ref 10–44)
ANION GAP (OHS): 11 MMOL/L (ref 8–16)
ANION GAP (OHS): 12 MMOL/L (ref 8–16)
ANION GAP (OHS): 9 MMOL/L (ref 8–16)
ANION GAP (OHS): 9 MMOL/L (ref 8–16)
AST SERPL-CCNC: 10 UNIT/L (ref 11–45)
AST SERPL-CCNC: 12 UNIT/L (ref 11–45)
AST SERPL-CCNC: 15 UNIT/L (ref 11–45)
BACTERIA #/AREA URNS AUTO: ABNORMAL /HPF
BACTERIA UR CULT: ABNORMAL
BACTERIA UR CULT: ABNORMAL
BASOPHILS # BLD AUTO: 0.01 K/UL
BASOPHILS # BLD AUTO: 0.01 K/UL
BASOPHILS # BLD AUTO: 0.02 K/UL
BASOPHILS # BLD AUTO: 0.02 K/UL
BASOPHILS NFR BLD AUTO: 0.1 %
BASOPHILS NFR BLD AUTO: 0.1 %
BASOPHILS NFR BLD AUTO: 0.2 %
BASOPHILS NFR BLD AUTO: 0.2 %
BILIRUB SERPL-MCNC: 0.3 MG/DL (ref 0.1–1)
BILIRUB SERPL-MCNC: 0.4 MG/DL (ref 0.1–1)
BILIRUB SERPL-MCNC: 0.5 MG/DL (ref 0.1–1)
BILIRUB UR QL STRIP.AUTO: ABNORMAL
BNP SERPL-MCNC: 85 PG/ML (ref 0–99)
BUN SERPL-MCNC: 19 MG/DL (ref 8–23)
BUN SERPL-MCNC: 32 MG/DL (ref 8–23)
BUN SERPL-MCNC: 36 MG/DL (ref 8–23)
BUN SERPL-MCNC: 36 MG/DL (ref 8–23)
CALCIUM SERPL-MCNC: 10.4 MG/DL (ref 8.7–10.5)
CALCIUM SERPL-MCNC: 9 MG/DL (ref 8.7–10.5)
CALCIUM SERPL-MCNC: 9.8 MG/DL (ref 8.7–10.5)
CALCIUM SERPL-MCNC: 9.8 MG/DL (ref 8.7–10.5)
CANCER AG19-9 SERPL-ACNC: <2.1 U/ML
CARCINOEMBRYONIC ANTIGEN (OHS): 4.6 NG/ML
CHLORIDE SERPL-SCNC: 106 MMOL/L (ref 95–110)
CHLORIDE SERPL-SCNC: 107 MMOL/L (ref 95–110)
CHLORIDE SERPL-SCNC: 108 MMOL/L (ref 95–110)
CHLORIDE SERPL-SCNC: 109 MMOL/L (ref 95–110)
CLARITY UR: ABNORMAL
CO2 SERPL-SCNC: 22 MMOL/L (ref 23–29)
CO2 SERPL-SCNC: 27 MMOL/L (ref 23–29)
CO2 SERPL-SCNC: 29 MMOL/L (ref 23–29)
CO2 SERPL-SCNC: 31 MMOL/L (ref 23–29)
COLOR UR AUTO: ABNORMAL
CREAT SERPL-MCNC: 0.5 MG/DL (ref 0.5–1.4)
CREAT SERPL-MCNC: 0.6 MG/DL (ref 0.5–1.4)
CREAT SERPL-MCNC: 0.7 MG/DL (ref 0.5–1.4)
CREAT SERPL-MCNC: 0.8 MG/DL (ref 0.5–1.4)
D DIMER PPP IA.FEU-MCNC: 2.52 MG/L FEU
DELSYS: ABNORMAL
DELSYS: ABNORMAL
EP: 7
ERYTHROCYTE [DISTWIDTH] IN BLOOD BY AUTOMATED COUNT: 15.9 % (ref 11.5–14.5)
ERYTHROCYTE [DISTWIDTH] IN BLOOD BY AUTOMATED COUNT: 15.9 % (ref 11.5–14.5)
ERYTHROCYTE [DISTWIDTH] IN BLOOD BY AUTOMATED COUNT: 16 % (ref 11.5–14.5)
ERYTHROCYTE [DISTWIDTH] IN BLOOD BY AUTOMATED COUNT: 16.1 % (ref 11.5–14.5)
ERYTHROCYTE [SEDIMENTATION RATE] IN BLOOD BY WESTERGREN METHOD: 12 MM/H
FIO2: 100
GFR SERPLBLD CREATININE-BSD FMLA CKD-EPI: >60 ML/MIN/1.73/M2
GLUCOSE SERPL-MCNC: 112 MG/DL (ref 70–110)
GLUCOSE SERPL-MCNC: 125 MG/DL (ref 70–110)
GLUCOSE SERPL-MCNC: 141 MG/DL (ref 70–110)
GLUCOSE SERPL-MCNC: 82 MG/DL (ref 70–110)
GLUCOSE UR QL STRIP: NEGATIVE
HCO3 UR-SCNC: 34.6 MMOL/L (ref 24–28)
HCT VFR BLD AUTO: 35.9 % (ref 40–54)
HCT VFR BLD AUTO: 36.5 % (ref 40–54)
HCT VFR BLD AUTO: 37.5 % (ref 40–54)
HCT VFR BLD AUTO: 41.5 % (ref 40–54)
HGB BLD-MCNC: 11.1 GM/DL (ref 14–18)
HGB BLD-MCNC: 11.3 GM/DL (ref 14–18)
HGB BLD-MCNC: 11.5 GM/DL (ref 14–18)
HGB BLD-MCNC: 13 GM/DL (ref 14–18)
HGB UR QL STRIP: ABNORMAL
HOLD SPECIMEN: NORMAL
HYALINE CASTS UR QL AUTO: 0 /LPF (ref 0–1)
IMM GRANULOCYTES # BLD AUTO: 0.03 K/UL (ref 0–0.04)
IMM GRANULOCYTES # BLD AUTO: 0.05 K/UL (ref 0–0.04)
IMM GRANULOCYTES NFR BLD AUTO: 0.3 % (ref 0–0.5)
IMM GRANULOCYTES NFR BLD AUTO: 0.3 % (ref 0–0.5)
IMM GRANULOCYTES NFR BLD AUTO: 0.4 % (ref 0–0.5)
IMM GRANULOCYTES NFR BLD AUTO: 0.4 % (ref 0–0.5)
IP: 14
KETONES UR QL STRIP: ABNORMAL
LACTATE SERPL-SCNC: 1.4 MMOL/L (ref 0.5–2.2)
LACTATE SERPL-SCNC: 1.8 MMOL/L (ref 0.5–2.2)
LDH SERPL L TO P-CCNC: 2.67 MMOL/L (ref 0.5–2.2)
LEUKOCYTE ESTERASE UR QL STRIP: ABNORMAL
LYMPHOCYTES # BLD AUTO: 1.15 K/UL (ref 1–4.8)
LYMPHOCYTES # BLD AUTO: 1.36 K/UL (ref 1–4.8)
LYMPHOCYTES # BLD AUTO: 1.42 K/UL (ref 1–4.8)
LYMPHOCYTES # BLD AUTO: 1.9 K/UL (ref 1–4.8)
MAGNESIUM SERPL-MCNC: 2.3 MG/DL (ref 1.6–2.6)
MAGNESIUM SERPL-MCNC: 2.3 MG/DL (ref 1.6–2.6)
MCH RBC QN AUTO: 25.7 PG (ref 27–31)
MCH RBC QN AUTO: 25.9 PG (ref 27–31)
MCH RBC QN AUTO: 26.2 PG (ref 27–31)
MCH RBC QN AUTO: 26.3 PG (ref 27–31)
MCHC RBC AUTO-ENTMCNC: 30.7 G/DL (ref 32–36)
MCHC RBC AUTO-ENTMCNC: 30.9 G/DL (ref 32–36)
MCHC RBC AUTO-ENTMCNC: 31 G/DL (ref 32–36)
MCHC RBC AUTO-ENTMCNC: 31.3 G/DL (ref 32–36)
MCV RBC AUTO: 83 FL (ref 82–98)
MCV RBC AUTO: 84 FL (ref 82–98)
MCV RBC AUTO: 85 FL (ref 82–98)
MCV RBC AUTO: 85 FL (ref 82–98)
MICROSCOPIC COMMENT: ABNORMAL
MODE: ABNORMAL
NITRITE UR QL STRIP: NEGATIVE
NUCLEATED RBC (/100WBC) (OHS): 0 /100 WBC
OHS QRS DURATION: 86 MS
OHS QTC CALCULATION: 457 MS
PCO2 BLDA: 82.8 MMHG (ref 35–45)
PH SMN: 7.23 [PH] (ref 7.35–7.45)
PH UR STRIP: 6 [PH]
PHOSPHATE SERPL-MCNC: 2.7 MG/DL (ref 2.7–4.5)
PHOSPHATE SERPL-MCNC: 3.4 MG/DL (ref 2.7–4.5)
PLATELET # BLD AUTO: 272 K/UL (ref 150–450)
PLATELET # BLD AUTO: 305 K/UL (ref 150–450)
PLATELET # BLD AUTO: 346 K/UL (ref 150–450)
PLATELET # BLD AUTO: ABNORMAL 10*3/UL
PLATELET BLD QL SMEAR: ABNORMAL
PMV BLD AUTO: 10.8 FL (ref 9.2–12.9)
PMV BLD AUTO: 10.9 FL (ref 9.2–12.9)
PO2 BLDA: 251 MMHG (ref 80–100)
POC BE: 5 MMOL/L (ref -2–2)
POC SATURATED O2: 100 % (ref 95–100)
POC TCO2: 37 MMOL/L (ref 23–27)
POCT GLUCOSE: 111 MG/DL (ref 70–110)
POCT GLUCOSE: 117 MG/DL (ref 70–110)
POCT GLUCOSE: 120 MG/DL (ref 70–110)
POCT GLUCOSE: 122 MG/DL (ref 70–110)
POCT GLUCOSE: 124 MG/DL (ref 70–110)
POCT GLUCOSE: 135 MG/DL (ref 70–110)
POCT GLUCOSE: 76 MG/DL (ref 70–110)
POCT GLUCOSE: 79 MG/DL (ref 70–110)
POCT GLUCOSE: 82 MG/DL (ref 70–110)
POCT GLUCOSE: 83 MG/DL (ref 70–110)
POCT GLUCOSE: 87 MG/DL (ref 70–110)
POCT GLUCOSE: 91 MG/DL (ref 70–110)
POCT GLUCOSE: 92 MG/DL (ref 70–110)
POCT GLUCOSE: 99 MG/DL (ref 70–110)
POTASSIUM SERPL-SCNC: 2.6 MMOL/L (ref 3.5–5.1)
POTASSIUM SERPL-SCNC: 3.6 MMOL/L (ref 3.5–5.1)
POTASSIUM SERPL-SCNC: 4 MMOL/L (ref 3.5–5.1)
POTASSIUM SERPL-SCNC: 4.8 MMOL/L (ref 3.5–5.1)
PROCALCITONIN SERPL-MCNC: 0.05 NG/ML
PROT SERPL-MCNC: 8.7 GM/DL (ref 6–8.4)
PROT SERPL-MCNC: 8.9 GM/DL (ref 6–8.4)
PROT SERPL-MCNC: 9.2 GM/DL (ref 6–8.4)
PROT UR QL STRIP: ABNORMAL
PSA FREE MFR SERPL: 39.56 %
PSA FREE SERPL-MCNC: 0.89 NG/ML
PSA SERPL-MCNC: 2.25 NG/ML
RBC # BLD AUTO: 4.24 M/UL (ref 4.6–6.2)
RBC # BLD AUTO: 4.3 M/UL (ref 4.6–6.2)
RBC # BLD AUTO: 4.47 M/UL (ref 4.6–6.2)
RBC # BLD AUTO: 5.02 M/UL (ref 4.6–6.2)
RBC #/AREA URNS AUTO: >100 /HPF (ref 0–4)
RELATIVE EOSINOPHIL (OHS): 0.1 %
RELATIVE EOSINOPHIL (OHS): 0.3 %
RELATIVE EOSINOPHIL (OHS): 0.6 %
RELATIVE EOSINOPHIL (OHS): 0.8 %
RELATIVE LYMPHOCYTE (OHS): 11.7 % (ref 18–48)
RELATIVE LYMPHOCYTE (OHS): 14.8 % (ref 18–48)
RELATIVE LYMPHOCYTE (OHS): 15 % (ref 18–48)
RELATIVE LYMPHOCYTE (OHS): 15.5 % (ref 18–48)
RELATIVE MONOCYTE (OHS): 11.8 % (ref 4–15)
RELATIVE MONOCYTE (OHS): 12.3 % (ref 4–15)
RELATIVE MONOCYTE (OHS): 12.5 % (ref 4–15)
RELATIVE MONOCYTE (OHS): 14.3 % (ref 4–15)
RELATIVE NEUTROPHIL (OHS): 70 % (ref 38–73)
RELATIVE NEUTROPHIL (OHS): 71 % (ref 38–73)
RELATIVE NEUTROPHIL (OHS): 71.6 % (ref 38–73)
RELATIVE NEUTROPHIL (OHS): 75.7 % (ref 38–73)
SAMPLE: ABNORMAL
SAMPLE: ABNORMAL
SITE: ABNORMAL
SITE: ABNORMAL
SODIUM SERPL-SCNC: 143 MMOL/L (ref 136–145)
SODIUM SERPL-SCNC: 144 MMOL/L (ref 136–145)
SODIUM SERPL-SCNC: 146 MMOL/L (ref 136–145)
SODIUM SERPL-SCNC: 147 MMOL/L (ref 136–145)
SP GR UR STRIP: >=1.03
TROPONIN I SERPL DL<=0.01 NG/ML-MCNC: 0.01 NG/ML
UROBILINOGEN UR STRIP-ACNC: 1 EU/DL
VANCOMYCIN TROUGH SERPL-MCNC: 15.4 UG/ML (ref 10–22)
WBC # BLD AUTO: 11.63 K/UL (ref 3.9–12.7)
WBC # BLD AUTO: 12.7 K/UL (ref 3.9–12.7)
WBC # BLD AUTO: 7.75 K/UL (ref 3.9–12.7)
WBC # BLD AUTO: 9.17 K/UL (ref 3.9–12.7)
WBC #/AREA URNS AUTO: >100 /HPF (ref 0–5)
WBC CLUMPS UR QL AUTO: ABNORMAL

## 2025-01-01 PROCEDURE — 27000207 HC ISOLATION

## 2025-01-01 PROCEDURE — 80053 COMPREHEN METABOLIC PANEL: CPT

## 2025-01-01 PROCEDURE — 83880 ASSAY OF NATRIURETIC PEPTIDE: CPT

## 2025-01-01 PROCEDURE — 85379 FIBRIN DEGRADATION QUANT: CPT | Performed by: STUDENT IN AN ORGANIZED HEALTH CARE EDUCATION/TRAINING PROGRAM

## 2025-01-01 PROCEDURE — 85025 COMPLETE CBC W/AUTO DIFF WBC: CPT

## 2025-01-01 PROCEDURE — 81003 URINALYSIS AUTO W/O SCOPE: CPT

## 2025-01-01 PROCEDURE — 63600175 PHARM REV CODE 636 W HCPCS: Performed by: STUDENT IN AN ORGANIZED HEALTH CARE EDUCATION/TRAINING PROGRAM

## 2025-01-01 PROCEDURE — 99223 1ST HOSP IP/OBS HIGH 75: CPT | Mod: 25,,, | Performed by: REGISTERED NURSE

## 2025-01-01 PROCEDURE — 25000003 PHARM REV CODE 250

## 2025-01-01 PROCEDURE — 83605 ASSAY OF LACTIC ACID: CPT

## 2025-01-01 PROCEDURE — 25500020 PHARM REV CODE 255: Performed by: STUDENT IN AN ORGANIZED HEALTH CARE EDUCATION/TRAINING PROGRAM

## 2025-01-01 PROCEDURE — 99900035 HC TECH TIME PER 15 MIN (STAT)

## 2025-01-01 PROCEDURE — 99497 ADVNCD CARE PLAN 30 MIN: CPT | Mod: 25,,, | Performed by: REGISTERED NURSE

## 2025-01-01 PROCEDURE — 36415 COLL VENOUS BLD VENIPUNCTURE: CPT | Performed by: STUDENT IN AN ORGANIZED HEALTH CARE EDUCATION/TRAINING PROGRAM

## 2025-01-01 PROCEDURE — 25000242 PHARM REV CODE 250 ALT 637 W/ HCPCS: Performed by: INTERNAL MEDICINE

## 2025-01-01 PROCEDURE — 93005 ELECTROCARDIOGRAM TRACING: CPT

## 2025-01-01 PROCEDURE — S5010 5% DEXTROSE AND 0.45% SALINE: HCPCS | Performed by: STUDENT IN AN ORGANIZED HEALTH CARE EDUCATION/TRAINING PROGRAM

## 2025-01-01 PROCEDURE — 25000003 PHARM REV CODE 250: Performed by: STUDENT IN AN ORGANIZED HEALTH CARE EDUCATION/TRAINING PROGRAM

## 2025-01-01 PROCEDURE — 36600 WITHDRAWAL OF ARTERIAL BLOOD: CPT

## 2025-01-01 PROCEDURE — 87040 BLOOD CULTURE FOR BACTERIA: CPT

## 2025-01-01 PROCEDURE — 36410 VNPNXR 3YR/> PHY/QHP DX/THER: CPT

## 2025-01-01 PROCEDURE — 84484 ASSAY OF TROPONIN QUANT: CPT

## 2025-01-01 PROCEDURE — 84145 PROCALCITONIN (PCT): CPT | Performed by: STUDENT IN AN ORGANIZED HEALTH CARE EDUCATION/TRAINING PROGRAM

## 2025-01-01 PROCEDURE — 11000001 HC ACUTE MED/SURG PRIVATE ROOM

## 2025-01-01 PROCEDURE — 94761 N-INVAS EAR/PLS OXIMETRY MLT: CPT

## 2025-01-01 PROCEDURE — 99233 SBSQ HOSP IP/OBS HIGH 50: CPT | Mod: 25,,, | Performed by: REGISTERED NURSE

## 2025-01-01 PROCEDURE — 83605 ASSAY OF LACTIC ACID: CPT | Performed by: STUDENT IN AN ORGANIZED HEALTH CARE EDUCATION/TRAINING PROGRAM

## 2025-01-01 PROCEDURE — 80202 ASSAY OF VANCOMYCIN: CPT | Performed by: STUDENT IN AN ORGANIZED HEALTH CARE EDUCATION/TRAINING PROGRAM

## 2025-01-01 PROCEDURE — 92610 EVALUATE SWALLOWING FUNCTION: CPT

## 2025-01-01 PROCEDURE — C1751 CATH, INF, PER/CENT/MIDLINE: HCPCS

## 2025-01-01 PROCEDURE — 51705 CHANGE OF BLADDER TUBE: CPT

## 2025-01-01 PROCEDURE — 94799 UNLISTED PULMONARY SVC/PX: CPT

## 2025-01-01 PROCEDURE — 96361 HYDRATE IV INFUSION ADD-ON: CPT

## 2025-01-01 PROCEDURE — 83735 ASSAY OF MAGNESIUM: CPT

## 2025-01-01 PROCEDURE — 82378 CARCINOEMBRYONIC ANTIGEN: CPT | Performed by: STUDENT IN AN ORGANIZED HEALTH CARE EDUCATION/TRAINING PROGRAM

## 2025-01-01 PROCEDURE — 27000190 HC CPAP FULL FACE MASK W/VALVE

## 2025-01-01 PROCEDURE — 82040 ASSAY OF SERUM ALBUMIN: CPT

## 2025-01-01 PROCEDURE — 99285 EMERGENCY DEPT VISIT HI MDM: CPT | Mod: 25

## 2025-01-01 PROCEDURE — 87088 URINE BACTERIA CULTURE: CPT

## 2025-01-01 PROCEDURE — 84100 ASSAY OF PHOSPHORUS: CPT

## 2025-01-01 PROCEDURE — 97535 SELF CARE MNGMENT TRAINING: CPT

## 2025-01-01 PROCEDURE — 27000221 HC OXYGEN, UP TO 24 HOURS

## 2025-01-01 PROCEDURE — 94667 MNPJ CHEST WALL 1ST: CPT

## 2025-01-01 PROCEDURE — 94761 N-INVAS EAR/PLS OXIMETRY MLT: CPT | Mod: XB

## 2025-01-01 PROCEDURE — 80048 BASIC METABOLIC PNL TOTAL CA: CPT | Performed by: STUDENT IN AN ORGANIZED HEALTH CARE EDUCATION/TRAINING PROGRAM

## 2025-01-01 PROCEDURE — 93010 ELECTROCARDIOGRAM REPORT: CPT | Mod: ,,, | Performed by: INTERNAL MEDICINE

## 2025-01-01 PROCEDURE — 5A09357 ASSISTANCE WITH RESPIRATORY VENTILATION, LESS THAN 24 CONSECUTIVE HOURS, CONTINUOUS POSITIVE AIRWAY PRESSURE: ICD-10-PCS | Performed by: INTERNAL MEDICINE

## 2025-01-01 PROCEDURE — 36415 COLL VENOUS BLD VENIPUNCTURE: CPT

## 2025-01-01 PROCEDURE — 94640 AIRWAY INHALATION TREATMENT: CPT

## 2025-01-01 PROCEDURE — 86301 IMMUNOASSAY TUMOR CA 19-9: CPT | Performed by: STUDENT IN AN ORGANIZED HEALTH CARE EDUCATION/TRAINING PROGRAM

## 2025-01-01 PROCEDURE — 85025 COMPLETE CBC W/AUTO DIFF WBC: CPT | Performed by: STUDENT IN AN ORGANIZED HEALTH CARE EDUCATION/TRAINING PROGRAM

## 2025-01-01 PROCEDURE — 63600175 PHARM REV CODE 636 W HCPCS

## 2025-01-01 PROCEDURE — 96374 THER/PROPH/DIAG INJ IV PUSH: CPT

## 2025-01-01 PROCEDURE — 92526 ORAL FUNCTION THERAPY: CPT

## 2025-01-01 PROCEDURE — 99223 1ST HOSP IP/OBS HIGH 75: CPT | Mod: GC,,, | Performed by: INTERNAL MEDICINE

## 2025-01-01 PROCEDURE — 82105 ALPHA-FETOPROTEIN SERUM: CPT | Performed by: STUDENT IN AN ORGANIZED HEALTH CARE EDUCATION/TRAINING PROGRAM

## 2025-01-01 PROCEDURE — 82803 BLOOD GASES ANY COMBINATION: CPT

## 2025-01-01 PROCEDURE — 94660 CPAP INITIATION&MGMT: CPT

## 2025-01-01 PROCEDURE — 84154 ASSAY OF PSA FREE: CPT | Performed by: STUDENT IN AN ORGANIZED HEALTH CARE EDUCATION/TRAINING PROGRAM

## 2025-01-01 RX ORDER — LORAZEPAM 2 MG/ML
0.5 INJECTION INTRAMUSCULAR EVERY 30 MIN PRN
Status: CANCELLED | OUTPATIENT
Start: 2025-01-01

## 2025-01-01 RX ORDER — ONDANSETRON HYDROCHLORIDE 2 MG/ML
4 INJECTION, SOLUTION INTRAVENOUS EVERY 6 HOURS PRN
Status: DISCONTINUED | OUTPATIENT
Start: 2025-01-01 | End: 2025-04-28 | Stop reason: HOSPADM

## 2025-01-01 RX ORDER — HYDRALAZINE HYDROCHLORIDE 20 MG/ML
10 INJECTION INTRAMUSCULAR; INTRAVENOUS EVERY 6 HOURS PRN
Status: DISCONTINUED | OUTPATIENT
Start: 2025-01-01 | End: 2025-04-28 | Stop reason: HOSPADM

## 2025-01-01 RX ORDER — IBUPROFEN 200 MG
24 TABLET ORAL
Status: DISCONTINUED | OUTPATIENT
Start: 2025-01-01 | End: 2025-04-28 | Stop reason: HOSPADM

## 2025-01-01 RX ORDER — GLUCAGON 1 MG
1 KIT INJECTION
Status: DISCONTINUED | OUTPATIENT
Start: 2025-01-01 | End: 2025-04-28 | Stop reason: HOSPADM

## 2025-01-01 RX ORDER — POTASSIUM CHLORIDE 7.45 MG/ML
10 INJECTION INTRAVENOUS
Status: COMPLETED | OUTPATIENT
Start: 2025-01-01 | End: 2025-01-01

## 2025-01-01 RX ORDER — TALC
6 POWDER (GRAM) TOPICAL NIGHTLY PRN
Status: DISCONTINUED | OUTPATIENT
Start: 2025-01-01 | End: 2025-04-28 | Stop reason: HOSPADM

## 2025-01-01 RX ORDER — CYPROHEPTADINE HYDROCHLORIDE 4 MG/1
TABLET ORAL
COMMUNITY
Start: 2025-01-01 | End: 2025-05-01

## 2025-01-01 RX ORDER — SERTRALINE HYDROCHLORIDE 25 MG/1
25 TABLET, FILM COATED ORAL DAILY
Status: DISCONTINUED | OUTPATIENT
Start: 2025-01-01 | End: 2025-04-28 | Stop reason: HOSPADM

## 2025-01-01 RX ORDER — DEXTROSE MONOHYDRATE AND SODIUM CHLORIDE 5; .45 G/100ML; G/100ML
INJECTION, SOLUTION INTRAVENOUS CONTINUOUS
Status: DISCONTINUED | OUTPATIENT
Start: 2025-01-01 | End: 2025-01-01

## 2025-01-01 RX ORDER — GUAIFENESIN 100 MG/5ML
200 LIQUID ORAL EVERY 4 HOURS PRN
Status: DISCONTINUED | OUTPATIENT
Start: 2025-01-01 | End: 2025-04-28 | Stop reason: HOSPADM

## 2025-01-01 RX ORDER — BISACODYL 5 MG
10 TABLET, DELAYED RELEASE (ENTERIC COATED) ORAL DAILY PRN
Status: DISCONTINUED | OUTPATIENT
Start: 2025-01-01 | End: 2025-04-28 | Stop reason: HOSPADM

## 2025-01-01 RX ORDER — PROCHLORPERAZINE EDISYLATE 5 MG/ML
5 INJECTION INTRAMUSCULAR; INTRAVENOUS EVERY 6 HOURS PRN
Status: DISCONTINUED | OUTPATIENT
Start: 2025-01-01 | End: 2025-04-28 | Stop reason: HOSPADM

## 2025-01-01 RX ORDER — DORZOLAMIDE HYDROCHLORIDE AND TIMOLOL MALEATE 20; 5 MG/ML; MG/ML
1 SOLUTION/ DROPS OPHTHALMIC 2 TIMES DAILY
Status: DISCONTINUED | OUTPATIENT
Start: 2025-01-01 | End: 2025-04-28 | Stop reason: HOSPADM

## 2025-01-01 RX ORDER — TRAZODONE HYDROCHLORIDE 50 MG/1
50 TABLET ORAL NIGHTLY
Status: DISCONTINUED | OUTPATIENT
Start: 2025-01-01 | End: 2025-04-28 | Stop reason: HOSPADM

## 2025-01-01 RX ORDER — MUPIROCIN 20 MG/G
OINTMENT TOPICAL 2 TIMES DAILY
Status: DISCONTINUED | OUTPATIENT
Start: 2025-01-01 | End: 2025-04-28 | Stop reason: HOSPADM

## 2025-01-01 RX ORDER — ACETAMINOPHEN 325 MG/1
650 TABLET ORAL EVERY 4 HOURS PRN
Status: DISCONTINUED | OUTPATIENT
Start: 2025-01-01 | End: 2025-04-28 | Stop reason: HOSPADM

## 2025-01-01 RX ORDER — MORPHINE SULFATE 4 MG/ML
2 INJECTION, SOLUTION INTRAMUSCULAR; INTRAVENOUS ONCE
Status: COMPLETED | OUTPATIENT
Start: 2025-01-01 | End: 2025-01-01

## 2025-01-01 RX ORDER — SODIUM CHLORIDE 0.9 % (FLUSH) 0.9 %
10 SYRINGE (ML) INJECTION EVERY 12 HOURS PRN
Status: DISCONTINUED | OUTPATIENT
Start: 2025-01-01 | End: 2025-04-28 | Stop reason: HOSPADM

## 2025-01-01 RX ORDER — ATROPINE SULFATE 0.1 MG/ML
1 INJECTION INTRAVENOUS ONCE
Status: DISCONTINUED | OUTPATIENT
Start: 2025-04-28 | End: 2025-04-28 | Stop reason: HOSPADM

## 2025-01-01 RX ORDER — BENZONATATE 100 MG/1
100 CAPSULE ORAL 3 TIMES DAILY PRN
Status: DISCONTINUED | OUTPATIENT
Start: 2025-01-01 | End: 2025-04-28 | Stop reason: HOSPADM

## 2025-01-01 RX ORDER — SODIUM CHLORIDE FOR INHALATION 3 %
4 VIAL, NEBULIZER (ML) INHALATION EVERY 8 HOURS
Status: DISCONTINUED | OUTPATIENT
Start: 2025-01-01 | End: 2025-04-28 | Stop reason: HOSPADM

## 2025-01-01 RX ORDER — DEXTROMETHORPHAN POLISTIREX 30 MG/5 ML
1 SUSPENSION, EXTENDED RELEASE 12 HR ORAL DAILY PRN
Status: DISCONTINUED | OUTPATIENT
Start: 2025-01-01 | End: 2025-04-28 | Stop reason: HOSPADM

## 2025-01-01 RX ORDER — ALUMINUM HYDROXIDE, MAGNESIUM HYDROXIDE, AND SIMETHICONE 1200; 120; 1200 MG/30ML; MG/30ML; MG/30ML
30 SUSPENSION ORAL 4 TIMES DAILY PRN
Status: DISCONTINUED | OUTPATIENT
Start: 2025-01-01 | End: 2025-04-28 | Stop reason: HOSPADM

## 2025-01-01 RX ORDER — POLYETHYLENE GLYCOL 3350 17 G/17G
17 POWDER, FOR SOLUTION ORAL DAILY
Status: DISCONTINUED | OUTPATIENT
Start: 2025-01-01 | End: 2025-04-28 | Stop reason: HOSPADM

## 2025-01-01 RX ORDER — NALOXONE HCL 0.4 MG/ML
0.02 VIAL (ML) INJECTION
Status: DISCONTINUED | OUTPATIENT
Start: 2025-01-01 | End: 2025-04-28 | Stop reason: HOSPADM

## 2025-01-01 RX ORDER — LORAZEPAM 2 MG/ML
1 INJECTION INTRAMUSCULAR
Status: DISCONTINUED | OUTPATIENT
Start: 2025-01-01 | End: 2025-04-28 | Stop reason: HOSPADM

## 2025-01-01 RX ORDER — MORPHINE SULFATE 4 MG/ML
2 INJECTION, SOLUTION INTRAMUSCULAR; INTRAVENOUS
Status: DISCONTINUED | OUTPATIENT
Start: 2025-01-01 | End: 2025-04-28 | Stop reason: HOSPADM

## 2025-01-01 RX ORDER — CEFTRIAXONE 1 G/1
1 INJECTION, POWDER, FOR SOLUTION INTRAMUSCULAR; INTRAVENOUS
Status: COMPLETED | OUTPATIENT
Start: 2025-01-01 | End: 2025-01-01

## 2025-01-01 RX ORDER — INSULIN ASPART 100 [IU]/ML
0-5 INJECTION, SOLUTION INTRAVENOUS; SUBCUTANEOUS EVERY 4 HOURS PRN
Status: DISCONTINUED | OUTPATIENT
Start: 2025-01-01 | End: 2025-04-28 | Stop reason: HOSPADM

## 2025-01-01 RX ORDER — AMOXICILLIN 250 MG
2 CAPSULE ORAL 2 TIMES DAILY
Status: DISCONTINUED | OUTPATIENT
Start: 2025-01-01 | End: 2025-04-28 | Stop reason: HOSPADM

## 2025-01-01 RX ORDER — TRAZODONE HYDROCHLORIDE 50 MG/1
50 TABLET ORAL NIGHTLY
COMMUNITY
Start: 2025-01-01 | End: 2025-05-01

## 2025-01-01 RX ORDER — LOPERAMIDE HYDROCHLORIDE 2 MG/1
4 CAPSULE ORAL ONCE AS NEEDED
Status: DISCONTINUED | OUTPATIENT
Start: 2025-01-01 | End: 2025-04-28 | Stop reason: HOSPADM

## 2025-01-01 RX ORDER — PANTOPRAZOLE SODIUM 40 MG/1
40 TABLET, DELAYED RELEASE ORAL DAILY
Status: DISCONTINUED | OUTPATIENT
Start: 2025-01-01 | End: 2025-04-28 | Stop reason: HOSPADM

## 2025-01-01 RX ORDER — IBUPROFEN 200 MG
16 TABLET ORAL
Status: DISCONTINUED | OUTPATIENT
Start: 2025-01-01 | End: 2025-04-28 | Stop reason: HOSPADM

## 2025-01-01 RX ORDER — MORPHINE SULFATE 4 MG/ML
2 INJECTION, SOLUTION INTRAMUSCULAR; INTRAVENOUS EVERY 4 HOURS PRN
Status: CANCELLED | OUTPATIENT
Start: 2025-01-01

## 2025-01-01 RX ADMIN — DORZOLAMIDE HYDROCHLORIDE AND TIMOLOL MALEATE 1 DROP: 20; 5 SOLUTION/ DROPS OPHTHALMIC at 09:04

## 2025-01-01 RX ADMIN — PANTOPRAZOLE SODIUM 40 MG: 40 TABLET, DELAYED RELEASE ORAL at 07:04

## 2025-01-01 RX ADMIN — POTASSIUM CHLORIDE 10 MEQ: 7.46 INJECTION, SOLUTION INTRAVENOUS at 02:04

## 2025-01-01 RX ADMIN — ERTAPENEM 1 G: 1 INJECTION INTRAMUSCULAR; INTRAVENOUS at 08:04

## 2025-01-01 RX ADMIN — VANCOMYCIN HYDROCHLORIDE 1750 MG: 1.75 INJECTION, POWDER, LYOPHILIZED, FOR SOLUTION INTRAVENOUS at 07:04

## 2025-01-01 RX ADMIN — CEFTRIAXONE 1 G: 1 INJECTION, POWDER, FOR SOLUTION INTRAMUSCULAR; INTRAVENOUS at 07:04

## 2025-01-01 RX ADMIN — DORZOLAMIDE HYDROCHLORIDE AND TIMOLOL MALEATE 1 DROP: 20; 5 SOLUTION/ DROPS OPHTHALMIC at 08:04

## 2025-01-01 RX ADMIN — SODIUM CHLORIDE 500 ML: 9 INJECTION, SOLUTION INTRAVENOUS at 05:04

## 2025-01-01 RX ADMIN — TRAZODONE HYDROCHLORIDE 50 MG: 50 TABLET ORAL at 08:04

## 2025-01-01 RX ADMIN — VANCOMYCIN HYDROCHLORIDE 1000 MG: 1 INJECTION, POWDER, LYOPHILIZED, FOR SOLUTION INTRAVENOUS at 09:04

## 2025-01-01 RX ADMIN — SERTRALINE HYDROCHLORIDE 25 MG: 25 TABLET ORAL at 08:04

## 2025-01-01 RX ADMIN — SERTRALINE HYDROCHLORIDE 25 MG: 25 TABLET ORAL at 07:04

## 2025-01-01 RX ADMIN — POTASSIUM CHLORIDE 10 MEQ: 7.46 INJECTION, SOLUTION INTRAVENOUS at 01:04

## 2025-01-01 RX ADMIN — POTASSIUM CHLORIDE 10 MEQ: 7.46 INJECTION, SOLUTION INTRAVENOUS at 12:04

## 2025-01-01 RX ADMIN — ERTAPENEM 1 G: 1 INJECTION INTRAMUSCULAR; INTRAVENOUS at 06:04

## 2025-01-01 RX ADMIN — SENNOSIDES AND DOCUSATE SODIUM 2 TABLET: 50; 8.6 TABLET ORAL at 08:04

## 2025-01-01 RX ADMIN — VANCOMYCIN HYDROCHLORIDE 1000 MG: 1 INJECTION, POWDER, LYOPHILIZED, FOR SOLUTION INTRAVENOUS at 07:04

## 2025-01-01 RX ADMIN — DEXTROSE AND SODIUM CHLORIDE: 5; 450 INJECTION, SOLUTION INTRAVENOUS at 11:04

## 2025-01-01 RX ADMIN — POLYETHYLENE GLYCOL 3350 17 G: 17 POWDER, FOR SOLUTION ORAL at 08:04

## 2025-01-01 RX ADMIN — MUPIROCIN: 20 OINTMENT TOPICAL at 09:04

## 2025-01-01 RX ADMIN — DEXTROSE AND SODIUM CHLORIDE: 5; 450 INJECTION, SOLUTION INTRAVENOUS at 07:04

## 2025-01-01 RX ADMIN — MORPHINE SULFATE 2 MG: 4 INJECTION INTRAVENOUS at 09:04

## 2025-01-01 RX ADMIN — IOHEXOL 80 ML: 350 INJECTION, SOLUTION INTRAVENOUS at 10:04

## 2025-01-01 RX ADMIN — THERA TABS 1 TABLET: TAB at 08:04

## 2025-01-01 RX ADMIN — PANTOPRAZOLE SODIUM 40 MG: 40 TABLET, DELAYED RELEASE ORAL at 08:04

## 2025-01-01 RX ADMIN — POTASSIUM CHLORIDE 10 MEQ: 7.46 INJECTION, SOLUTION INTRAVENOUS at 09:04

## 2025-01-01 RX ADMIN — MORPHINE SULFATE 2 MG: 4 INJECTION INTRAVENOUS at 08:04

## 2025-01-01 RX ADMIN — DORZOLAMIDE HYDROCHLORIDE AND TIMOLOL MALEATE 1 DROP: 20; 5 SOLUTION/ DROPS OPHTHALMIC at 10:04

## 2025-01-01 RX ADMIN — THERA TABS 1 TABLET: TAB at 07:04

## 2025-01-01 RX ADMIN — POTASSIUM CHLORIDE 10 MEQ: 7.46 INJECTION, SOLUTION INTRAVENOUS at 03:04

## 2025-01-01 RX ADMIN — TRAZODONE HYDROCHLORIDE 50 MG: 50 TABLET ORAL at 09:04

## 2025-01-01 RX ADMIN — SODIUM CHLORIDE SOLN NEBU 3% 4 ML: 3 NEBU SOLN at 03:04

## 2025-01-01 RX ADMIN — VANCOMYCIN HYDROCHLORIDE 1000 MG: 1 INJECTION, POWDER, LYOPHILIZED, FOR SOLUTION INTRAVENOUS at 08:04

## 2025-04-23 PROBLEM — Z78.9 NURSING HOME RESIDENT: Chronic | Status: ACTIVE | Noted: 2025-04-23

## 2025-04-23 PROBLEM — Z97.8 CHRONIC INDWELLING FOLEY CATHETER: Chronic | Status: ACTIVE | Noted: 2025-04-23

## 2025-04-23 NOTE — ED TRIAGE NOTES
Pt presents to ED via EMS from [facility] for sacral wounds and possible UTI. Staff at facility report decreased oral intake and cough. Pt noted to have suprapubic catheter in place. Pt on home oxygen at 2L via NC baseline. Afebrile. Pt awake, alert, and oriented to person, place, and situation.

## 2025-04-23 NOTE — Clinical Note
Diagnosis: Sepsis, due to unspecified organism, unspecified whether acute organ dysfunction present [[id number]]   Future Attending Provider: ISAI ROSALES [[id number]]   Reason for IP Medical Treatment  (Clinical interventions that can only be accomplished in the IP setting? ) :: sepsis, encephalopathy   Plans for Post-Acute care--if anticipated (pick the single best option):: I. Nursing Home (residential)   Special Needs:: Disoriented [14]   Special Needs:: Fall Risk [15]

## 2025-04-23 NOTE — ED PROVIDER NOTES
Encounter Date: 4/23/2025       History     Chief Complaint   Patient presents with    Skin Ulcer     Pt BIB EMS, due to decreased appetite and non healing sacral wound. Pt to ED from [facility]. Pt also has cough. UTO temp in triage.      84-year-old male with pmh of atrial fibrillation not on anticoagulation, hypertension, DM, hyperlipidemia, CHF, CAD, history of recurrent UTIs and urinary retention with suprapubic catheter in place, and dementia brought in by EMS for evaluation of chronic decubitus ulcer and decreased p.o. intake.  In emergency department, patient denies any complaints.  Discussed patient with nursing home staff, who reports that patient has had decreased appetite, weight loss    The history is provided by the patient, medical records and a caregiver. The history is limited by the condition of the patient. No  was used.     Review of patient's allergies indicates:  No Known Allergies  Past Medical History:   Diagnosis Date    Anemia     Atrial flutter     Bacterial meningitis     BPH (benign prostatic hyperplasia)     CAD (coronary artery disease)     Cataract     CHF (congestive heart failure)     Dementia     Diabetes mellitus     Vargas catheter in place     GERD (gastroesophageal reflux disease)     Glaucoma     History of psychiatric hospitalization     sent to us from [facility]    Hyperlipidemia     Hypertension     Metabolic encephalopathy     Nursing home resident     Renal disorder     Schizoaffective disorder     Urinary retention     UTI (urinary tract infection)      Past Surgical History:   Procedure Laterality Date    CARDIAC CATHETERIZATION  09/07/2017    VESICOSTOMY N/A 9/27/2022    Procedure: CREATION, CYSTOSTOMY, cystoscopy, bladder stone removal possible laser lithotripsy;  Surgeon: Ruthy Guerra MD;  Location: [facility];  Service: Urology;  Laterality: N/A;  holmium laser needed  RN PHONE PREOP DONE WITH [facility] NURSE,   VACCINATED      Family History   Problem Relation Name Age of Onset    Hypertension Mother      Heart disease Mother      Diabetes Mother      Heart disease Father      Hypertension Father      Diabetes Father      Heart disease Sister      Diabetes Sister      Heart disease Brother      Diabetes Brother      Anxiety disorder Neg Hx      Depression Neg Hx       Social History[1]      Physical Exam     Initial Vitals   BP Pulse Resp Temp SpO2   04/23/25 1602 04/23/25 1602 04/23/25 1605 04/23/25 1640 04/23/25 1602   98/66 99 20 98.6 °F (37 °C) 100 %      MAP       --                Physical Exam    Nursing note and vitals reviewed.  Constitutional: He appears cachectic.   Chronically ill-appearing   HENT:   Head: Normocephalic and atraumatic.   Eyes:   No pupillary light response; patient is blind  Thick, white discharge from eyes bilaterally   Cardiovascular:  Regular rhythm and normal heart sounds.   Tachycardia present.         Pulmonary/Chest: No accessory muscle usage. Tachypnea noted. He has decreased breath sounds in the right lower field and the left lower field. He has no wheezes. He has no rhonchi. He has no rales.   Abdominal: Abdomen is soft. There is no abdominal tenderness.   Suprapubic catheter in place without surrounding erythema or swelling     Neurological: He is alert.   Oriented to person but not to place or time  Moving all extremities               ED Course   Procedures  Labs Reviewed   COMPREHENSIVE METABOLIC PANEL - Abnormal       Result Value    Sodium 144      Potassium 4.0      Chloride 106      CO2 27      Glucose 141 (*)     BUN 36 (*)     Creatinine 0.8      Calcium 10.4      Protein Total 9.2 (*)     Albumin 3.0 (*)     Bilirubin Total 0.5            AST 10 (*)     ALT <5 (*)     Anion Gap 11      eGFR >60     URINALYSIS, REFLEX TO URINE CULTURE - Abnormal    Color, UA Red (*)     Appearance, UA Cloudy (*)     pH, UA 6.0      Spec Grav UA >=1.030 (*)     Protein, UA 3+ (*)     Glucose, UA  Negative      Ketones, UA Trace (*)     Bilirubin, UA 1+ (*)     Blood, UA 2+ (*)     Nitrites, UA Negative      Urobilinogen, UA 1.0      Leukocyte Esterase, UA 1+ (*)    CBC WITH DIFFERENTIAL - Abnormal    WBC 12.70      RBC 5.02      HGB 13.0 (*)     HCT 41.5      MCV 83      MCH 25.9 (*)     MCHC 31.3 (*)     RDW 15.9 (*)     Platelet Count 346      MPV 10.9      Nucleated RBC 0      Neut % 70.0      Lymph % 15.0 (*)     Mono % 14.3      Eos % 0.1      Basophil % 0.2      Imm Grans % 0.4      Neut # 8.91 (*)     Lymph # 1.90      Mono # 1.81 (*)     Eos # 0.01      Baso # 0.02      Imm Grans # 0.05 (*)    URINALYSIS MICROSCOPIC - Abnormal    RBC, UA >100 (*)     WBC, UA >100 (*)     WBC Clumps, UA Few (*)     Bacteria, UA Few (*)     Hyaline Casts, UA 0      Microscopic Comment       ISTAT LACTATE - Abnormal    POC Lactate 2.67 (*)     Sample VENOUS      Site Other      Allens Test N/A      DelSys Nasal Can     LACTIC ACID, PLASMA - Normal    Lactic Acid Level 1.8      Narrative:     Falsely low lactic acid results can be found in samples containing >=13.0 mg/dL total bilirubin and/or >=3.5 mg/dL direct bilirubin.    TROPONIN I - Normal    Troponin-I 0.008     B-TYPE NATRIURETIC PEPTIDE - Normal    BNP 85     CULTURE, BLOOD   CULTURE, BLOOD   CLOSTRIDIOIDES DIFFICILE   CULTURE, URINE   CBC W/ AUTO DIFFERENTIAL    Narrative:     The following orders were created for panel order CBC auto differential.  Procedure                               Abnormality         Status                     ---------                               -----------         ------                     CBC with Differential[[id number]]       Abnormal            Final result                 Please view results for these tests on the individual orders.   GREY TOP URINE HOLD    Extra Tube Hold for add-ons.       EKG Readings: (Independently Interpreted)   Initial Reading: No STEMI. Previous EKG: Compared with most recent EKG Previous EKG Date:  06/23/2024. Rhythm: Sinus Tachycardia. Heart Rate: 104. Other Impression: AK/QRS/QTC within normal limits.  Diffuse ST depressions without reciprocal elevations.       Imaging Results              X-Ray Chest AP Portable (Final result)  Result time 04/23/25 17:32:06      Final result by Johnny Thomas MD (04/23/25 17:32:06)                   Impression:      1. Chronic appearing interstitial findings, no large focal consolidation.      Electronically signed by: Johnny Thomas MD  Date:    04/23/2025  Time:    17:32               Narrative:    EXAMINATION:  XR CHEST AP PORTABLE    CLINICAL HISTORY:  Sepsis;    TECHNIQUE:  Single frontal view of the chest was performed.    COMPARISON:  06/24/2024    FINDINGS:  The cardiomediastinal silhouette is not enlarged noting tortuosity of the aorta..  There is no pleural effusion.  The trachea is midline.  The lungs are symmetrically expanded bilaterally with coarse interstitial attenuation bilaterally..  No large focal consolidation seen.  There is no pneumothorax.  The osseous structures are remarkable for degenerative change and osteopenia.  There is gas and stool distention of the bowel..                                       Medications   sodium chloride 0.9% flush 10 mL (has no administration in time range)   melatonin tablet 6 mg (has no administration in time range)   ondansetron injection 4 mg (has no administration in time range)   prochlorperazine injection Soln 5 mg (has no administration in time range)   polyethylene glycol packet 17 g (has no administration in time range)   mineral oil enema 1 enema (has no administration in time range)   aluminum-magnesium hydroxide-simethicone 200-200-20 mg/5 mL suspension 30 mL (has no administration in time range)   acetaminophen tablet 650 mg (has no administration in time range)   naloxone 0.4 mg/mL injection 0.02 mg (has no administration in time range)   glucose chewable tablet 16 g (has no administration in time  range)   glucose chewable tablet 24 g (has no administration in time range)   glucagon (human recombinant) injection 1 mg (has no administration in time range)   hydrALAZINE injection 10 mg (has no administration in time range)   guaiFENesin 100 mg/5 ml syrup 200 mg (has no administration in time range)   benzonatate capsule 100 mg (has no administration in time range)   loperamide capsule 4 mg (has no administration in time range)   dorzolamide-timolol 2-0.5% ophthalmic solution 1 drop (has no administration in time range)   multivitamin tablet (has no administration in time range)   pantoprazole EC tablet 40 mg (has no administration in time range)   senna-docusate 8.6-50 mg per tablet 2 tablet (0 tablets Oral Hold 4/23/25 2219)   sertraline tablet 25 mg (has no administration in time range)   traZODone tablet 50 mg (50 mg Oral Not Given 4/23/25 2219)   bisacodyL EC tablet 10 mg (has no administration in time range)   insulin aspart U-100 pen 0-5 Units (has no administration in time range)   sodium chloride 0.9% bolus 500 mL 500 mL (0 mLs Intravenous Stopped 4/23/25 1829)   cefTRIAXone injection 1 g (1 g Intravenous Given 4/23/25 1923)     Medical Decision Making  84-year-old male with pmh of atrial fibrillation not on anticoagulation, hypertension, DM, hyperlipidemia, CHF, CAD, history of recurrent UTIs and urinary retention with suprapubic catheter in place, and dementia brought in by EMS for evaluation of chronic decubitus ulcer and decreased p.o. intake.    Differential diagnosis includes, but is not limited to, sepsis secondary to pneumonia, sepsis secondary to UTI, progression of dementia, failure to thrive.    In emergency department, patient hemodynamically stable, no acute distress.  EKG with diffuse ST depressions without reciprocal elevations.  Sepsis workup started on patient.  Lactate elevated, so patient received IV fluids.  Exchanged patient's suprapubic catheter and urinalysis obtained from new  urine sample.  Urinalysis concerning for infection, patient is started on ceftriaxone.  Admitted patient to Hospital Medicine for encephalopathy, sepsis.  Discussed patient with Hospital Medicine, who agreed to admit patient to their service.    This patient does have evidence of infective focus  My overall impression is sepsis.  Source: Urinary Tract  Antibiotics given- Antibiotics (72h ago, onward)    None      Latest lactate reviewed-  Lab             04/23/25                       1659          POCLAC       2.67*         Organ dysfunction indicated by Encephalopathy    Fluid challenge Fluid Not Needed - Patient is not hypotensive and/or lactate is less than 4.0.     Post- resuscitation assessment No - Post resuscitation assessment not needed       Will Not start Pressors- Levophed for MAP of 65  Source control achieved by: ceftriaxone      Amount and/or Complexity of Data Reviewed  External Data Reviewed: radiology.     Details: === Results for orders placed during the hospital encounter of 03/20/23 ===    Echo    - Interpretation Summary -  · The left ventricle is normal in size with concentric hypertrophy and normal systolic function.  · The estimated ejection fraction is 65%.  · Normal left ventricular diastolic function.  · Normal right ventricular size with normal right ventricular systolic function.  · Mild left atrial enlargement.  · Mild right atrial enlargement.  · Mild tricuspid regurgitation.  · TDS - limited views      Labs: ordered. Decision-making details documented in ED Course.  Radiology: ordered. Decision-making details documented in ED Course.  ECG/medicine tests: ordered and independent interpretation performed. Decision-making details documented in ED Course.    Risk  Prescription drug management.  Decision regarding hospitalization.  Risk Details: Patient received ceftriaxone in the emergency department.  Admitted patient to Hospital Medicine for sepsis, encephalopathy.               ED  Course as of 25   1705 CBC auto differential(!)  Anemia, most recent 13.5   No leukocytosis []   170 ISTAT Lactate(!)  Mildly elevated. Will give fluids []   173 X-Ray Chest AP Portable  Independent interpretation of the chest x-ray: No large consolidation or effusion []   1734 Comprehensive metabolic panel(!)  BUN elevated, no recent labs for comparison   No concerning electrolyte abnormalities []   1753 Brain natriuretic peptide  WNL, lowering concern for CHF []   1837 Troponin I  WNL, lowering concern for ACS []      ED Course User Index  [] Etienne Ortiz MD                           Clinical Impression:  Final diagnoses:  [Z13.6] Screening for cardiovascular condition  [A41.9] Sepsis, due to unspecified organism, unspecified whether acute organ dysfunction present (Primary)  [G93.40] Encephalopathy, unspecified type          ED Disposition Condition    Admit                   Etienne Ortiz MD  Resident  25 0716         [1]   Social History  Tobacco Use    Smoking status: Former     Current packs/day: 0.00     Types: Cigarettes     Quit date: 2003     Years since quittin.7    Smokeless tobacco: Never   Substance Use Topics    Alcohol use: No    Drug use: No        Etienne Ortiz MD  Resident  25

## 2025-04-24 PROBLEM — E43 SEVERE PROTEIN-CALORIE MALNUTRITION: Status: ACTIVE | Noted: 2025-04-24

## 2025-04-24 NOTE — ASSESSMENT & PLAN NOTE
- pt has lost 68 lbs since 6/24 (last prior recorded weight on file); 34% body weight loss in under a year   - blindness and need for assistance with feeding likely a contributing factor, especially given NH residency   - dementia also likely factor but pt willingly drank sips of boost and bites of soft foods with assistance from myself and daughter during consult visit   - pt did have difficulty with mechanics of intake and coughed with liquids; discussed SLP consult with Dr. Reyes, order placed

## 2025-04-24 NOTE — ASSESSMENT & PLAN NOTE
- daughter shared lack of accomodation for pt's needs related to blindness; Carlota, palliative SW to follow up with [facility] to assess availability of resources at facility and update daughter    - likely contributing to weight loss/ poor PO intake as pt requires assistance/feeding with meals

## 2025-04-24 NOTE — PROGRESS NOTES
"[facility] Medicine  Progress Note    Patient Name: Magan Rose  MRN: [id number]  Patient Class: IP- Inpatient   Admission Date: 4/23/2025  Length of Stay: 1 days  Attending Physician: Kiel Reyes MD  Primary Care Provider: Andrae Sanchez MD        Subjective     Principal Problem:Complicated urinary tract infection        HPI:  Magan Rose is a 84 y.o. male with  Indwelling suprapubic catheter for at least 4 years, Non-Insulin-dependent DM2, HTN, HLD, AFib not on AC, and   Dementia who presented to [facility] ED for eval and treatment of chronic decubitus ulcer and decreased p.o. intake.  In emergency department, patient denies any complaints. Dtr reports noticing urine has been cloudy for several weeks, as well as a decrease in appetite over the past month, and has lost a substantial amount of weight and overall decline in his health over the last 6 months ("200 lbs" was recorded about 10 months ago; pt appears much less than that on presentation).  Dtr also says that he has a wet cough that he has not been able to always cough up.   He was hospitalized at [facility] over the New Year with a Proteus mirabilis UTI.    ED course: Lactate 2.67.  UA obtained but still pending on admission; delayed by lab processing.  BP soft in ED and required supplemental O2, but no fever.  Exam with underweight appearance, disoriented.  Labs WBC 12.7 Hgb 13.  BUN 36 Crt 0.8.  BNP Trop WNL.  EKG Sinus tachycardia w freq PACs.  CXR without large consolidation.  ED treatments: , CTX 1g.  Suprapubic catheter exchanged.  They were admitted to [facility] for further evaluation and treatment.        Overview/Hospital Course:  Admitted from with decreased oral intake, and FTT with weight loss. Found to have UTI, has had recent ESBL Ecoli, will start Ertapenem until blood cx and urine cx result. Palliative care consult. Wound care consult for sacral wound, as well as buttocks and heal. Appears " to have lost 68 lbs this year, daughter says it has been in the last month or two which is highly concerning for malignancy vs advanced dementia FTT. I spoke about code status with her and she would like to continue that conversation with palliative team today.     Wt Readings from Last 4 Encounters:   04/23/25 60 kg (132 lb 4.4 oz)   06/23/24 90.7 kg (200 lb)   09/03/23 90.7 kg (200 lb)   07/10/23 90.7 kg (199 lb 15.3 oz)         Interval History:  NAEON.  No new issues.   CC- Gen Fatigue  All questions answered and updates on care given.       ROS:  Dementia     Vitals:    04/23/25 2315 04/24/25 0107 04/24/25 0513 04/24/25 0700   BP:   119/74    Pulse:  61 72 74   Resp:   17    Temp:   97.5 °F (36.4 °C)    TempSrc:   Oral    SpO2:   100%    Weight: 60 kg (132 lb 4.4 oz)             Body mass index is 18.45 kg/m².      PHYSICAL EXAM:  GENERAL APPEARANCE: alert and cooperative. Frail     HEAD: NC/AT  CARDIAC: There is no cyanosis or pallor.   LUNGS: No apparent wheezing or stridor.  ABDOMEN: Non-distended. No guarding.  MSK: No joint erythema or tenderness.           Recent Results (from the past 24 hours)   Blood culture x two cultures. Draw prior to antibiotics.    Collection Time: 04/23/25  4:53 PM    Specimen: Peripheral, Forearm, Right; Blood   Result Value Ref Range    Blood Culture No Growth After 6 Hours    Blood culture x two cultures. Draw prior to antibiotics.    Collection Time: 04/23/25  4:53 PM    Specimen: Peripheral, Forearm, Left; Blood   Result Value Ref Range    Blood Culture No Growth After 6 Hours    Comprehensive metabolic panel    Collection Time: 04/23/25  4:53 PM   Result Value Ref Range    Sodium 144 136 - 145 mmol/L    Potassium 4.0 3.5 - 5.1 mmol/L    Chloride 106 95 - 110 mmol/L    CO2 27 23 - 29 mmol/L    Glucose 141 (H) 70 - 110 mg/dL    BUN 36 (H) 8 - 23 mg/dL    Creatinine 0.8 0.5 - 1.4 mg/dL    Calcium 10.4 8.7 - 10.5 mg/dL    Protein Total 9.2 (H) 6.0 - 8.4 gm/dL    Albumin 3.0 (L)  3.5 - 5.2 g/dL    Bilirubin Total 0.5 0.1 - 1.0 mg/dL     40 - 150 unit/L    AST 10 (L) 11 - 45 unit/L    ALT <5 (L) 10 - 44 unit/L    Anion Gap 11 8 - 16 mmol/L    eGFR >60 >60 mL/min/1.73/m2   CBC with Differential    Collection Time: 04/23/25  4:53 PM   Result Value Ref Range    WBC 12.70 3.90 - 12.70 K/uL    RBC 5.02 4.60 - 6.20 M/uL    HGB 13.0 (L) 14.0 - 18.0 gm/dL    HCT 41.5 40.0 - 54.0 %    MCV 83 82 - 98 fL    MCH 25.9 (L) 27.0 - 31.0 pg    MCHC 31.3 (L) 32.0 - 36.0 g/dL    RDW 15.9 (H) 11.5 - 14.5 %    Platelet Count 346 150 - 450 K/uL    MPV 10.9 9.2 - 12.9 fL    Nucleated RBC 0 <=0 /100 WBC    Neut % 70.0 38 - 73 %    Lymph % 15.0 (L) 18 - 48 %    Mono % 14.3 4 - 15 %    Eos % 0.1 <=8 %    Basophil % 0.2 <=1.9 %    Imm Grans % 0.4 0.0 - 0.5 %    Neut # 8.91 (H) 1.8 - 7.7 K/uL    Lymph # 1.90 1 - 4.8 K/uL    Mono # 1.81 (H) 0.3 - 1 K/uL    Eos # 0.01 <=0.5 K/uL    Baso # 0.02 <=0.2 K/uL    Imm Grans # 0.05 (H) 0.00 - 0.04 K/uL   Troponin I    Collection Time: 04/23/25  4:53 PM   Result Value Ref Range    Troponin-I 0.008 <=0.026 ng/mL   ISTAT Lactate    Collection Time: 04/23/25  4:59 PM   Result Value Ref Range    POC Lactate 2.67 (H) 0.5 - 2.2 mmol/L    Sample VENOUS     Site Other     Allens Test N/A     DelSys Nasal Can    Brain natriuretic peptide    Collection Time: 04/23/25  5:17 PM   Result Value Ref Range    BNP 85 0 - 99 pg/mL   Urinalysis, Reflex to Urine Culture    Collection Time: 04/23/25  8:23 PM    Specimen: Urine, Clean Catch   Result Value Ref Range    Color, UA Red (A) Straw, Urvashi, Yellow, Light-Orange    Appearance, UA Cloudy (A) Clear    pH, UA 6.0 5.0 - 8.0    Spec Grav UA >=1.030 (A) 1.005 - 1.030    Protein, UA 3+ (A) Negative    Glucose, UA Negative Negative    Ketones, UA Trace (A) Negative    Bilirubin, UA 1+ (A) Negative    Blood, UA 2+ (A) Negative    Nitrites, UA Negative Negative    Urobilinogen, UA 1.0 <2.0 EU/dL    Leukocyte Esterase, UA 1+ (A) Negative   GREY TOP  URINE HOLD    Collection Time: 04/23/25  8:23 PM   Result Value Ref Range    Extra Tube Hold for add-ons.    Urinalysis Microscopic    Collection Time: 04/23/25  8:23 PM   Result Value Ref Range    RBC, UA >100 (H) 0 - 4 /HPF    WBC, UA >100 (H) 0 - 5 /HPF    WBC Clumps, UA Few (A) None, Rare    Bacteria, UA Few (A) None, Rare, Occasional /HPF    Hyaline Casts, UA 0 0 - 1 /LPF    Microscopic Comment     Lactic acid, plasma #2    Collection Time: 04/23/25  9:12 PM   Result Value Ref Range    Lactic Acid Level 1.8 0.5 - 2.2 mmol/L   CBC with Differential    Collection Time: 04/24/25  4:53 AM   Result Value Ref Range    WBC 11.63 3.90 - 12.70 K/uL    RBC 4.47 (L) 4.60 - 6.20 M/uL    HGB 11.5 (L) 14.0 - 18.0 gm/dL    HCT 37.5 (L) 40.0 - 54.0 %    MCV 84 82 - 98 fL    MCH 25.7 (L) 27.0 - 31.0 pg    MCHC 30.7 (L) 32.0 - 36.0 g/dL    RDW 16.1 (H) 11.5 - 14.5 %    Platelet Count 305 150 - 450 K/uL    MPV 10.9 9.2 - 12.9 fL    Nucleated RBC 0 <=0 /100 WBC    Neut % 75.7 (H) 38 - 73 %    Lymph % 11.7 (L) 18 - 48 %    Mono % 11.8 4 - 15 %    Eos % 0.3 <=8 %    Basophil % 0.2 <=1.9 %    Imm Grans % 0.3 0.0 - 0.5 %    Neut # 8.81 (H) 1.8 - 7.7 K/uL    Lymph # 1.36 1 - 4.8 K/uL    Mono # 1.37 (H) 0.3 - 1 K/uL    Eos # 0.04 <=0.5 K/uL    Baso # 0.02 <=0.2 K/uL    Imm Grans # 0.03 0.00 - 0.04 K/uL       Microbiology Results (last 7 days)       Procedure Component Value Units Date/Time    Blood culture x two cultures. Draw prior to antibiotics. [[id number]]  (Normal) Collected: 04/23/25 1653    Order Status: Completed Specimen: Blood from Peripheral, Forearm, Right Updated: 04/24/25 0008     Blood Culture No Growth After 6 Hours    Blood culture x two cultures. Draw prior to antibiotics. [[id number]]  (Normal) Collected: 04/23/25 1653    Order Status: Completed Specimen: Blood from Peripheral, Forearm, Left Updated: 04/24/25 0008     Blood Culture No Growth After 6 Hours    Urine culture [[id number]] Collected: 04/23/25 2023     Order Status: Sent Specimen: Urine, Clean Catch Updated: 04/23/25 2047    Clostridium difficile EIA [[id number]]     Order Status: Sent Specimen: Stool              Imaging Results              X-Ray Chest AP Portable (Final result)  Result time 04/23/25 17:32:06      Final result by Johnny Thomas MD (04/23/25 17:32:06)                   Impression:      1. Chronic appearing interstitial findings, no large focal consolidation.      Electronically signed by: Johnny Thomas MD  Date:    04/23/2025  Time:    17:32               Narrative:    EXAMINATION:  XR CHEST AP PORTABLE    CLINICAL HISTORY:  Sepsis;    TECHNIQUE:  Single frontal view of the chest was performed.    COMPARISON:  06/24/2024    FINDINGS:  The cardiomediastinal silhouette is not enlarged noting tortuosity of the aorta..  There is no pleural effusion.  The trachea is midline.  The lungs are symmetrically expanded bilaterally with coarse interstitial attenuation bilaterally..  No large focal consolidation seen.  There is no pneumothorax.  The osseous structures are remarkable for degenerative change and osteopenia.  There is gas and stool distention of the bowel..                                           Assessment & Plan  Complicated urinary tract infection  This 84 y.o. male presented with the following signs & symptoms of a UTI: abnormal smelling urine, foul smelling urine, and encephalopathy .  They do have signs/sxs that suggest infection extends beyond the bladder: malaise.    Objective UTI findings so far include:  UA on admission .    The pt does have a recently-documented UTI confirmed with UCx: Proteus mirabilis at [facility] [place].  Suspect this patient has Complicated cystitis.  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   If pt becomes febrile then draw 2 blood culture tubes, each from different site and initiate Sepsis protocols      Chronic indwelling  "Vargas catheter  Per UTI.    Type 2 diabetes mellitus, controlled  Patient's FSGs are controlled on current medication regimen.  Last A1c reviewed-   Lab Results   Component Value Date    HGBA1C 5.3 12/10/2020     Most recent fingerstick glucose reviewed- No results for input(s): "POCTGLUCOSE" in the last 24 hours.  Current correctional scale   low  Maintain anti-hyperglycemic dose as follows-   Antihyperglycemics (From admission, onward)      Start     Stop Route Frequency Ordered    04/23/25 2237  insulin aspart U-100 pen 0-5 Units         -- SubQ Every 4 hours PRN 04/23/25 2137          Hold Oral hypoglycemics while patient is in the hospital.  Chronic heart failure with preserved ejection fraction  Well-compensated on exam.  Holding home Coreg and amlodipine in setting of sepsis concern, will consider resuming when stable but more likely defer to PCP given how frail he has become (so as not to increase chances of orthostasis).    Essential hypertension  Per HFpEF.  Non-occlusive coronary artery disease  Not on statin, ASA, or Plavix.  On Tele, WCTM.    Anemia of chronic disease  Anemia is likely due to chronic disease due to Chronic Kidney Disease. Most recent hemoglobin and hematocrit are listed below.  Recent Labs     04/23/25  1653 04/24/25  0453   HGB 13.0* 11.5*   HCT 41.5 37.5*     Plan  - Monitor serial CBC: Daily  - Transfuse PRBC if patient becomes hemodynamically unstable, symptomatic or H/H drops below 7/21.  - Patient has not received any PRBC transfusions to date  - Patient's anemia is currently stable      Legally blind  Will use frequent verbal assurances and cues, and assist with feeding      Nursing home resident  CM/SW consult to assist with his return to [facility] when ready.      VTE Risk Mitigation (From admission, onward)           Ordered     IP VTE HIGH RISK PATIENT  Once         04/23/25 2030     Place sequential compression device  Until discontinued         04/23/25 2030     Reason for " No Pharmacological VTE Prophylaxis  Once        Question:  Reasons:  Answer:  Risk of Bleeding    04/23/25 2030                    Discharge Planning   HARISH:      Code Status: Full Code   Medical Readiness for Discharge Date:                            Kiel Reyes MD  Department of [facility] Medicine   [facility]

## 2025-04-24 NOTE — HPI
"Magan Rose is a 84 y.o. male with  Indwelling suprapubic catheter for at least 4 years, Non-Insulin-dependent DM2, HTN, HLD, AFib not on AC, and   Dementia who presented to [facility] ED for eval and treatment of chronic decubitus ulcer and decreased p.o. intake.  In emergency department, patient denies any complaints. Dtr reports noticing urine has been cloudy for several weeks, as well as a decrease in appetite over the past month, and has lost a substantial amount of weight and overall decline in his health over the last 6 months ("200 lbs" was recorded about 10 months ago; pt appears much less than that on presentation).  Dtr also says that he has a wet cough that he has not been able to always cough up.   He was hospitalized at [facility] over the New Year with a Proteus mirabilis UTI.    ED course: Lactate 2.67.  UA obtained but still pending on admission; delayed by lab processing.  BP soft in ED and required supplemental O2, but no fever.  Exam with underweight appearance, disoriented.  Labs WBC 12.7 Hgb 13.  BUN 36 Crt 0.8.  BNP Trop WNL.  EKG Sinus tachycardia w freq PACs.  CXR without large consolidation.  ED treatments: , CTX 1g.  Suprapubic catheter exchanged.  They were admitted to [facility] [place] [facility] for further evaluation and treatment.  "

## 2025-04-24 NOTE — HPI
"From H&P: "Magan Rose is a 84 y.o. male with  Indwelling suprapubic catheter for at least 4 years, Non-Insulin-dependent DM2, HTN, HLD, AFib not on AC, and   Dementia who presented to [facility] ED for eval and treatment of chronic decubitus ulcer and decreased p.o. intake.  In emergency department, patient denies any complaints. Dtr reports noticing urine has been cloudy for several weeks, as well as a decrease in appetite over the past month, and has lost a substantial amount of weight and overall decline in his health over the last 6 months ("200 lbs" was recorded about 10 months ago; pt appears much less than that on presentation).  Dtr also says that he has a wet cough that he has not been able to always cough up.   He was hospitalized at [facility] [place] over the New Year with a Proteus mirabilis UTI.     ED course: Lactate 2.67.  UA obtained but still pending on admission; delayed by lab processing.  BP soft in ED and required supplemental O2, but no fever.  Exam with underweight appearance, disoriented.  Labs WBC 12.7 Hgb 13.  BUN 36 Crt 0.8.  BNP Trop WNL.  EKG Sinus tachycardia w freq PACs.  CXR without large consolidation.  ED treatments: , CTX 1g.  Suprapubic catheter exchanged.  They were admitted to [facility] [place] [place] [facility] for further evaluation and treatment."     Palliative medicine consulted for goals of care discussion and advance care planning; for details of visit, see advance care planning section of plan.     "

## 2025-04-24 NOTE — HOSPITAL COURSE
Admitted from with decreased oral intake, and FTT with weight loss. Found to have UTI, has had recent ESBL Ecoli, will start Ertapenem until blood cx and urine cx result. Palliative care consult. Wound care consult for sacral wound, as well as buttocks and heal. Appears to have lost 68 lbs this year, daughter says it has been in the last month or two which is highly concerning for malignancy vs advanced dementia FTT. I spoke about code status with her and she would like to continue that conversation with palliative team today.      E.coli and GBS in urine. Previous E.coli was ESBL, ertapenem should cover both for now. Urine culture did come back with ESBL Ecoli, as well as MRSA. Vanc added, contact precautions.    Wt Readings from Last 4 Encounters:   04/23/25 60 kg (132 lb 4.4 oz)   06/23/24 90.7 kg (200 lb)   09/03/23 90.7 kg (200 lb)   07/10/23 90.7 kg (199 lb 15.3 oz)

## 2025-04-24 NOTE — H&P
"    [facility] Medicine  History & Physical    Patient Name: Magan Rose  MRN: [id number]  Patient Class: IP- Inpatient  Admission Date: 4/23/2025  Attending Physician: Kiel Reyes MD   Primary Care Provider: Andrae Sanchez MD         Patient information was obtained from patient, relative(s), past medical records, and ER records.     Subjective:     Principal Problem:Complicated urinary tract infection    Chief Complaint:   Chief Complaint   Patient presents with    Skin Ulcer     Pt BIB EMS, due to decreased appetite and non healing sacral wound. Pt to ED from [facility]. Pt also has cough. UTO temp in triage.         HPI: Magan Rose is a 84 y.o. male with  Indwelling suprapubic catheter for at least 4 years, Non-Insulin-dependent DM2, HTN, HLD, AFib not on AC, and   Dementia who presented to [facility] ED for eval and treatment of chronic decubitus ulcer and decreased p.o. intake.  In emergency department, patient denies any complaints. Dtr reports noticing urine has been cloudy for several weeks, as well as a decrease in appetite over the past month, and has lost a substantial amount of weight and overall decline in his health over the last 6 months ("200 lbs" was recorded about 10 months ago; pt appears much less than that on presentation).  Dtr also says that he has a wet cough that he has not been able to always cough up.   He was hospitalized at [facility] over the New Year with a Proteus mirabilis UTI.    ED course: Lactate 2.67.  UA obtained but still pending on admission; delayed by lab processing.  BP soft in ED and required supplemental O2, but no fever.  Exam with underweight appearance, disoriented.  Labs WBC 12.7 Hgb 13.  BUN 36 Crt 0.8.  BNP Trop WNL.  EKG Sinus tachycardia w freq PACs.  CXR without large consolidation.  ED treatments: , CTX 1g.  Suprapubic catheter exchanged.  They were admitted to [facility] for further evaluation and " treatment.        Past Medical History:   Diagnosis Date    Anemia     Atrial flutter     Bacterial meningitis     BPH (benign prostatic hyperplasia)     CAD (coronary artery disease)     Cataract     CHF (congestive heart failure)     Dementia     Diabetes mellitus     Vargas catheter in place     GERD (gastroesophageal reflux disease)     Glaucoma     History of psychiatric hospitalization     sent to us from [facility]    Hyperlipidemia     Hypertension     Metabolic encephalopathy     Nursing home resident     Renal disorder     Schizoaffective disorder     Urinary retention     UTI (urinary tract infection)        Past Surgical History:   Procedure Laterality Date    CARDIAC CATHETERIZATION  09/07/2017    VESICOSTOMY N/A 9/27/2022    Procedure: CREATION, CYSTOSTOMY, cystoscopy, bladder stone removal possible laser lithotripsy;  Surgeon: Ruthy Guerra MD;  Location: [facility];  Service: Urology;  Laterality: N/A;  holmium laser needed  RN PHONE PREOP DONE WITH [facility] NURSE,   VACCINATED       Review of patient's allergies indicates:  No Known Allergies    No current facility-administered medications on file prior to encounter.     Current Outpatient Medications on File Prior to Encounter   Medication Sig    cyproheptadine (PERIACTIN) 4 mg tablet Take by mouth.    traZODone (DESYREL) 50 MG tablet Take 50 mg by mouth every evening.    acetaminophen (TYLENOL) 325 MG tablet Take 325 mg by mouth every 6 (six) hours as needed for Pain.    amLODIPine (NORVASC) 5 MG tablet Take 5 mg by mouth once daily.    bisacodyL (DULCOLAX) 5 mg EC tablet Take 2 tablets (10 mg total) by mouth daily as needed for Constipation.    carvedilol (COREG) 3.125 MG tablet Take 3.125 mg by mouth 2 (two) times daily.    dorzolamide-timolol 2-0.5% (COSOPT) 22.3-6.8 mg/mL ophthalmic solution Place 1 drop into both eyes 2 (two) times daily.    melatonin (MELATIN) 3 mg tablet Take 6 mg by mouth nightly as needed for Insomnia.     multivitamin with minerals tablet Take 1 tablet by mouth once daily.    pantoprazole (PROTONIX) 40 MG tablet Take 1 tablet (40 mg total) by mouth once daily.    potassium chloride SA (K-DUR,KLOR-CON M) 10 MEQ tablet Take 1 tablet (10 mEq total) by mouth once daily.    senna-docusate 8.6-50 mg (PERICOLACE) 8.6-50 mg per tablet Take 2 tablets by mouth 2 (two) times daily.    sertraline (ZOLOFT) 25 MG tablet Take 25 mg by mouth once daily.    [DISCONTINUED] traZODone (DESYREL) 150 MG tablet Take 150 mg by mouth every evening.     Family History       Problem Relation (Age of Onset)    Diabetes Mother, Father, Sister, Brother    Heart disease Mother, Father, Sister, Brother    Hypertension Mother, Father          Tobacco Use    Smoking status: Former     Current packs/day: 0.00     Types: Cigarettes     Quit date: 2003     Years since quittin.7    Smokeless tobacco: Never   Substance and Sexual Activity    Alcohol use: No    Drug use: No    Sexual activity: Not Currently     Review of Systems   Reason unable to perform ROS: ROS was performed and pertinent +s and -s are listed in HPI.     Objective:     Vital Signs (Most Recent):  Temp: 98.6 °F (37 °C) (25 1640)  Pulse: 93 (25)  Resp: (!) 28 (25 1832)  BP: 94/64 (25)  SpO2: 100 % (25) Vital Signs (24h Range):  Temp:  [98.6 °F (37 °C)] 98.6 °F (37 °C)  Pulse:  [] 93  Resp:  [20-31] 28  SpO2:  [96 %-100 %] 100 %  BP: ()/(55-81) 94/64        There is no height or weight on file to calculate BMI.     Physical Exam  Constitutional:       General: He is not in acute distress.     Appearance: Normal appearance. He is not ill-appearing.   HENT:      Head: Normocephalic.   Neck:      Comments: JVP not elevated  Cardiovascular:      Rate and Rhythm: Normal rate and regular rhythm.      Pulses: Normal pulses.      Heart sounds: Normal heart sounds.   Pulmonary:      Effort: Pulmonary effort is normal.      Breath  sounds: Normal breath sounds.   Abdominal:      General: Abdomen is flat. Bowel sounds are normal.      Tenderness: There is abdominal tenderness. There is no guarding.   Musculoskeletal:      Right lower leg: No edema.      Left lower leg: No edema.   Skin:     General: Skin is warm and dry.      Capillary Refill: Capillary refill takes less than 2 seconds.      Coloration: Skin is not jaundiced.   Neurological:      General: No focal deficit present.      Mental Status: He is alert and oriented to person, place, and time.   Psychiatric:         Mood and Affect: Mood normal.         Behavior: Behavior normal.                Significant Labs: All pertinent labs within the past 24 hours have been reviewed.  CBC:   Recent Labs   Lab 04/23/25  1653   WBC 12.70   HGB 13.0*   HCT 41.5        CMP:   Recent Labs   Lab 04/23/25  1653      K 4.0      CO2 27   BUN 36*   CREATININE 0.8   CALCIUM 10.4   ALBUMIN 3.0*   BILITOT 0.5   ALKPHOS 112   AST 10*   ALT <5*   ANIONGAP 11     Cardiac Markers:   Recent Labs   Lab 04/23/25  1717   BNP 84     Urine Studies: UA pending, delayed due to lab processing issue    Significant Imaging: I have reviewed all pertinent imaging results/findings within the past 24 hours.          Assessment/Plan:     Assessment & Plan  Complicated urinary tract infection  This 84 y.o. male presented with the following signs & symptoms of a UTI: abnormal smelling urine, foul smelling urine, and encephalopathy .  They do have signs/sxs that suggest infection extends beyond the bladder: malaise.    Objective UTI findings so far include:  UA on admission .    The pt does have a recently-documented UTI confirmed with UCx: Proteus mirabilis at [facility] in January.  Suspect this patient has Complicated cystitis.  Treatment per IDSA guidelines as follows:    F/u urine gram stain and culture  Ceftriaxone 2g IV q24h starting 4/23   If pt does not improve within 24-48h, becomes critically ill, or shows  "ESBL activity on UCX, then will start Meropenem plus Vancomycin  If pt becomes febrile then draw 2 blood culture tubes, each from different site and initiate Sepsis protocols      Chronic indwelling Vargas catheter  Per UTI.    Type 2 diabetes mellitus, controlled  Patient's FSGs are controlled on current medication regimen.  Last A1c reviewed-   Lab Results   Component Value Date    HGBA1C 5.3 12/10/2020     Most recent fingerstick glucose reviewed- No results for input(s): "POCTGLUCOSE" in the last 24 hours.  Current correctional scale   low  Maintain anti-hyperglycemic dose as follows-   Antihyperglycemics (From admission, onward)      Start     Stop Route Frequency Ordered    04/23/25 2237  insulin aspart U-100 pen 0-5 Units         -- SubQ Every 4 hours PRN 04/23/25 2137          Hold Oral hypoglycemics while patient is in the hospital.  Chronic heart failure with preserved ejection fraction  Well-compensated on exam.  Holding home Coreg and amlodipine in setting of sepsis concern, will consider resuming when stable but more likely defer to PCP given how frail he has become (so as not to increase chances of orthostasis).    Essential hypertension  Per HFpEF.  Non-occlusive coronary artery disease  Not on statin, ASA, or Plavix.  On Tele, WCTM.    Anemia of chronic disease  Anemia is likely due to chronic disease due to Chronic Kidney Disease. Most recent hemoglobin and hematocrit are listed below.  Recent Labs     04/23/25  1653   HGB 13.0*   HCT 41.5     Plan  - Monitor serial CBC: Daily  - Transfuse PRBC if patient becomes hemodynamically unstable, symptomatic or H/H drops below 7/21.  - Patient has not received any PRBC transfusions to date  - Patient's anemia is currently stable      Legally blind  Will use frequent verbal assurances and cues, and assist with feeding      Nursing home resident  CM/SW consult to assist with his return to [facility] when ready.      VTE Risk Mitigation (From admission, onward) "           Ordered     IP VTE HIGH RISK PATIENT  Once         04/23/25 2030     Place sequential compression device  Until discontinued         04/23/25 2030     Reason for No Pharmacological VTE Prophylaxis  Once        Question:  Reasons:  Answer:  Risk of Bleeding    04/23/25 2030                                    Jose Elias Salomon MD  Department of Hospital Medicine  [facility] - Emergency Dept

## 2025-04-24 NOTE — ASSESSMENT & PLAN NOTE
This 84 y.o. male presented with the following signs & symptoms of a UTI: abnormal smelling urine, foul smelling urine, and encephalopathy.  They do have signs/sxs that suggest infection extends beyond the bladder: malaise.    Objective UTI findings so far include: UA on admission.    The pt does have a recently-documented UTI confirmed with UCx: Proteus mirabilis at [facility] in January.  Suspect this patient has Complicated cystitis.  Treatment per IDSA guidelines as follows:    F/u urine gram stain and culture  Ceftriaxone 2g IV q24h starting 4/23   If pt does not improve within 24-48h, becomes critically ill, or shows ESBL activity on UCX, then will start Meropenem plus Vancomycin  If pt becomes febrile then draw 2 blood culture tubes, each from different site and initiate Sepsis protocols

## 2025-04-24 NOTE — PROVIDER PROGRESS NOTES - EMERGENCY DEPT.
Encounter Date: 4/23/2025    ED Physician Progress Notes            Critical Care    Date/Time: 4/24/2025 1:29 AM    Performed by: Librado Arndt MD  Authorized by: Librado Arndt MD  Direct patient critical care time: 15 minutes  Ordering / reviewing critical care time: 10 minutes  Documentation critical care time: 10 minutes  Total critical care time (exclusive of procedural time) : 35 minutes  Critical care time was exclusive of separately billable procedures and treating other patients and teaching time.  Critical care was necessary to treat or prevent imminent or life-threatening deterioration of the following conditions: sepsis.  Critical care was time spent personally by me on the following activities: blood draw for specimens, development of treatment plan with patient or surrogate, obtaining history from patient or surrogate, ordering and performing treatments and interventions, ordering and review of laboratory studies, ordering and review of radiographic studies, pulse oximetry, re-evaluation of patient's condition, examination of patient and evaluation of patient's response to treatment.  Comments: Sepsis requiring broad-spectrum antibiotics.      Urinary Catheter    Date/Time: 4/24/2025 1:29 AM  Location procedure was performed: [facility] EMERGENCY DEPARTMENT    Performed by: Librado Arndt MD  Authorized by: Librado Arndt MD  Consent Done: Not Needed  Indications: catheter change  Local anesthesia used: no    Anesthesia:  Local anesthesia used: no    Patient sedated: no  Preparation: Patient was prepped and draped in the usual sterile fashion.  Catheter insertion: indwelling  Catheter type: Vargas  Catheter size: 22 Fr  Complicated insertion: no  Altered anatomy: no  Bladder irrigation: no  Number of attempts: 1  Patient tolerance: Patient tolerated the procedure well with no immediate complications  Comments: Suprapubic catheter.

## 2025-04-24 NOTE — SUBJECTIVE & OBJECTIVE
Past Medical History:   Diagnosis Date    Anemia     Atrial flutter     Bacterial meningitis     BPH (benign prostatic hyperplasia)     CAD (coronary artery disease)     Cataract     CHF (congestive heart failure)     Dementia     Diabetes mellitus     Vargas catheter in place     GERD (gastroesophageal reflux disease)     Glaucoma     History of psychiatric hospitalization     sent to us from [facility]    Hyperlipidemia     Hypertension     Metabolic encephalopathy     Nursing home resident     Renal disorder     Schizoaffective disorder     Urinary retention     UTI (urinary tract infection)        Past Surgical History:   Procedure Laterality Date    CARDIAC CATHETERIZATION  09/07/2017    VESICOSTOMY N/A 9/27/2022    Procedure: CREATION, CYSTOSTOMY, cystoscopy, bladder stone removal possible laser lithotripsy;  Surgeon: Ruthy Guerra MD;  Location: [facility];  Service: Urology;  Laterality: N/A;  holmium laser needed  RN PHONE PREOP DONE WITH [facility] HOME NURSE,   VACCINATED       Review of patient's allergies indicates:  No Known Allergies    No current facility-administered medications on file prior to encounter.     Current Outpatient Medications on File Prior to Encounter   Medication Sig    cyproheptadine (PERIACTIN) 4 mg tablet Take by mouth.    traZODone (DESYREL) 50 MG tablet Take 50 mg by mouth every evening.    acetaminophen (TYLENOL) 325 MG tablet Take 325 mg by mouth every 6 (six) hours as needed for Pain.    amLODIPine (NORVASC) 5 MG tablet Take 5 mg by mouth once daily.    bisacodyL (DULCOLAX) 5 mg EC tablet Take 2 tablets (10 mg total) by mouth daily as needed for Constipation.    carvedilol (COREG) 3.125 MG tablet Take 3.125 mg by mouth 2 (two) times daily.    dorzolamide-timolol 2-0.5% (COSOPT) 22.3-6.8 mg/mL ophthalmic solution Place 1 drop into both eyes 2 (two) times daily.    melatonin (MELATIN) 3 mg tablet Take 6 mg by mouth nightly as needed for Insomnia.    multivitamin with minerals  tablet Take 1 tablet by mouth once daily.    pantoprazole (PROTONIX) 40 MG tablet Take 1 tablet (40 mg total) by mouth once daily.    potassium chloride SA (K-DUR,KLOR-CON M) 10 MEQ tablet Take 1 tablet (10 mEq total) by mouth once daily.    senna-docusate 8.6-50 mg (PERICOLACE) 8.6-50 mg per tablet Take 2 tablets by mouth 2 (two) times daily.    sertraline (ZOLOFT) 25 MG tablet Take 25 mg by mouth once daily.    [DISCONTINUED] traZODone (DESYREL) 150 MG tablet Take 150 mg by mouth every evening.     Family History       Problem Relation (Age of Onset)    Diabetes Mother, Father, Sister, Brother    Heart disease Mother, Father, Sister, Brother    Hypertension Mother, Father          Tobacco Use    Smoking status: Former     Current packs/day: 0.00     Types: Cigarettes     Quit date: 2003     Years since quittin.7    Smokeless tobacco: Never   Substance and Sexual Activity    Alcohol use: No    Drug use: No    Sexual activity: Not Currently     Review of Systems   Reason unable to perform ROS: ROS was performed and pertinent +s and -s are listed in HPI.     Objective:     Vital Signs (Most Recent):  Temp: 98.6 °F (37 °C) (25 1640)  Pulse: 93 (25)  Resp: (!) 28 (25 1832)  BP: 94/64 (25)  SpO2: 100 % (25) Vital Signs (24h Range):  Temp:  [98.6 °F (37 °C)] 98.6 °F (37 °C)  Pulse:  [] 93  Resp:  [20-31] 28  SpO2:  [96 %-100 %] 100 %  BP: ()/(55-81) 94/64        There is no height or weight on file to calculate BMI.     Physical Exam  Constitutional:       General: He is not in acute distress.     Appearance: Normal appearance. He is not ill-appearing.   HENT:      Head: Normocephalic.   Neck:      Comments: JVP not elevated  Cardiovascular:      Rate and Rhythm: Normal rate and regular rhythm.      Pulses: Normal pulses.      Heart sounds: Normal heart sounds.   Pulmonary:      Effort: Pulmonary effort is normal.      Breath sounds: Normal breath sounds.    Abdominal:      General: Abdomen is flat. Bowel sounds are normal.      Tenderness: There is abdominal tenderness. There is no guarding.   Musculoskeletal:      Right lower leg: No edema.      Left lower leg: No edema.   Skin:     General: Skin is warm and dry.      Capillary Refill: Capillary refill takes less than 2 seconds.      Coloration: Skin is not jaundiced.   Neurological:      General: No focal deficit present.      Mental Status: He is alert and oriented to person, place, and time.   Psychiatric:         Mood and Affect: Mood normal.         Behavior: Behavior normal.                Significant Labs: All pertinent labs within the past 24 hours have been reviewed.  CBC:   Recent Labs   Lab 04/23/25  1653   WBC 12.70   HGB 13.0*   HCT 41.5        CMP:   Recent Labs   Lab 04/23/25  1653      K 4.0      CO2 27   BUN 36*   CREATININE 0.8   CALCIUM 10.4   ALBUMIN 3.0*   BILITOT 0.5   ALKPHOS 112   AST 10*   ALT <5*   ANIONGAP 11     Cardiac Markers:   Recent Labs   Lab 04/23/25  1717   BNP 85     Urine Studies: UA pending, delayed due to lab processing issue    Significant Imaging: I have reviewed all pertinent imaging results/findings within the past 24 hours.

## 2025-04-24 NOTE — CONSULTS
[facility] - Telemetry  Wound Care    Patient Name:  Magan Rose   MRN:  [id number]  Date: 2025  Diagnosis: Complicated urinary tract infection    History:     Past Medical History:   Diagnosis Date    Anemia     Atrial flutter     Bacterial meningitis     BPH (benign prostatic hyperplasia)     CAD (coronary artery disease)     Cataract     CHF (congestive heart failure)     Dementia     Diabetes mellitus     Vargas catheter in place     GERD (gastroesophageal reflux disease)     Glaucoma     History of psychiatric hospitalization     sent to us from [facility]    Hyperlipidemia     Hypertension     Metabolic encephalopathy     Nursing home resident     Renal disorder     Schizoaffective disorder     Urinary retention     UTI (urinary tract infection)        Social History[1]    Precautions:     Allergies as of 2025    (No Known Allergies)       [facility] Assessment Details/Treatment     Sacral wound- unstageable          Right buttocks-unstageable      Left buttocks-unstageable        Recommendations made to primary team for sacral area and buttocks: treat per wound order guidelines: cleanse with NS, apply hydrofiber then silicone bordered foam dressing; change every other day. Left heel resolved area, cleanse with NS, apply mepilex dressing and change every other day for prevention of breakdown.  Ensure green boots in place for heel elevation; turn q2h. Orders placed.     2025         [1]   Social History  Socioeconomic History    Marital status:     Number of children: 2    Years of education: 12   Occupational History    Occupation: retired   Tobacco Use    Smoking status: Former     Current packs/day: 0.00     Types: Cigarettes     Quit date: 2003     Years since quittin.7    Smokeless tobacco: Never   Substance and Sexual Activity    Alcohol use: No    Drug use: No    Sexual activity: Not Currently     Social Drivers of Health     Financial Resource Strain: Low Risk  (2025)     Overall Financial Resource Strain (CARDIA)     Difficulty of Paying Living Expenses: Not hard at all   Food Insecurity: No Food Insecurity (4/24/2025)    Hunger Vital Sign     Worried About Running Out of Food in the Last Year: Never true     Ran Out of Food in the Last Year: Never true   Transportation Needs: No Transportation Needs (4/24/2025)    PRAPARE - Transportation     Lack of Transportation (Medical): No     Lack of Transportation (Non-Medical): No   Physical Activity: Inactive (4/24/2025)    Exercise Vital Sign     Days of Exercise per Week: 0 days     Minutes of Exercise per Session: 0 min   Stress: No Stress Concern Present (4/24/2025)    [employer] [place] [employer] - Occupational Stress Questionnaire     Feeling of Stress : Not at all   Housing Stability: Low Risk  (4/24/2025)    Housing Stability Vital Sign     Unable to Pay for Housing in the Last Year: No     Number of Times Moved in the Last Year: 1     Homeless in the Last Year: No

## 2025-04-24 NOTE — ASSESSMENT & PLAN NOTE
2025 - Consult   - consult received; interval chart reviewed in detail  - met with patient and daughter, Brooke, at bedside; introduction to palliative medicine team and role in current care and admission   - learned more about pt outside of current admission; he has been a resident of [facility] since [place] (); was a resident of a NH in [place] that closed due to [place]; pt's wife is  (associated with need for NH); pt has 2 adult children, Brooke and Magan; Brooke lives locally and Magan lives out of town   - children, Brooke and Magan, share legal surrogate decision making   - Brooke shares concerns and difficulty with working full time and limited availability to visit pt during the day, with visits often in evening and weekends, typically visits a few times a week; recently for approx 2 months Brooke was without a vehicle and had increased difficulty visiting pt at NH; in this time she reports pt lost a significant amount of weight and increase in wounds (see blindness)   - GOC/ACP discussion  - daughter considering DNR, as this aligns with her wishes for pt and feels that it aligns with what pt would advocate for himself if able; she plans to discuss with Magan   - NH continues to be a necessity to meet pt's care needs  - reviewed philosophy of care of hospice; daughter considering addition of hospice care at NH; discussed plan for CM/SW to provide list of hospice agencies for review and coordinate informational meeting with preferred provider   - reviewed medicare rights related to hospice care   - emotional support provided   - Allowed time for questions/concerns; all addressed; expressed availability of myself/palliative team for additional questions/concerns   - updated MDT

## 2025-04-24 NOTE — ASSESSMENT & PLAN NOTE
- pt with chronic suprapubic catheter  - daughter shared concerns for cloudiness of pt's urine with NH care team over the last 2-3 weeks   - pt with recent history of sepsis related to UTI   - additional support and management of catheter with hospice specialized care could be beneficial in reducing infections and promptly treating them to avoid need for associated hospitalizations

## 2025-04-24 NOTE — PLAN OF CARE
Case Management Assessment     PCP: Andrae Sanchez MD   Pharmacy:   [id number] - [place], [place] - [address] AT [facility]  [address] [address]  Phone: [phone] Fax: [phone]    [facility] [address]  [address]  [facility] [address]  Phone: [phone] Fax: [phone]      Patient Arrived From: [facility]   Existing Help at Home: [facility] staff    Barriers to Discharge: None at this time    Discharge Plan:    A. Return to Nursing Home   B. Return to Nursing Home       CM met with patient and patient's daughter, Brooke at bedside to discuss discharge. Patient was asleep during the assessment. Patient bed bound,  is a [facility] reside, and uses oxygen at home. Patient's daughter was unsure of how many liters of oxygen patient is on at the nursing home. Patient will need ambulance transportation home upon discharge.     1:36 pm CM received a secured chat from Brooke with Palliative Care stating the patient's daughter is requesting a hospice informational. CM contacted patient's daughter to discuss. CM asked patient's daughter if patient will return to the nursing home with hospice and asked if she would like CM to contact [facility] to see who they are in contract with. She said no she will choose who she want too. CM will provide a region list as per requested by patient's daughter, Brooke.     JAMARI contacted Poonam with [facility] and asked who they are contracted with and she stated that they are only contracted with [facility] and they do not accept any others. Brooke with [facility]iative Care updated.      2:15 pm CM delivered hospice list at bedside.     CM will continue to follow patient for discharge needs.        04/24/25 0951   Discharge Assessment   Assessment Type Discharge Planning Assessment   Confirmed/corrected address, phone number and insurance Yes   Confirmed Demographics Correct on  Facesheet   Source of Information family   Communicated HARISH with patient/caregiver Yes   Reason For Admission Complicated Uninary Tract Infection   People in Home facility resident   Facility Arrived From: [facility]   Do you expect to return to your current living situation? Yes   Do you have help at home or someone to help you manage your care at home? Yes   Who are your caregiver(s) and their phone number(s)? Facility Staff   Prior to hospitilization cognitive status: Alert/Oriented   Current cognitive status: Alert/Oriented   Walking or Climbing Stairs Difficulty yes   Walking or Climbing Stairs transferring difficulty, requires equipment;transferring difficulty, assistance 1 person   Mobility Management Patient is bedbound   Dressing/Bathing Difficulty yes   Dressing/Bathing dressing difficulty, assistance 1 person;bathing difficulty, assistance 1 person   Equipment Currently Used at Home oxygen   Readmission within 30 days? No   Patient currently being followed by outpatient case management? No   Do you currently have service(s) that help you manage your care at home? No   Do you take prescription medications? Yes   Do you have prescription coverage? Yes   Coverage Medicare Part A & B   Do you have any problems affording any of your prescribed medications? No   Is the patient taking medications as prescribed? yes   Who is going to help you get home at discharge? Patient will need ambulance transportation back to [facility]   How do you get to doctors appointments? agency   Are you on dialysis? No   Do you take coumadin? No   Discharge Plan A Return to nursing home   Discharge Plan B Return to Nursing Home   DME Needed Upon Discharge  none   Discharge Plan discussed with: Adult children   Transition of Care Barriers None   SDOH   (None)   Physical Activity   On average, how many days per week do you engage in moderate to strenuous exercise (like a brisk walk)? 0 days   On average, how many  minutes do you engage in exercise at this level? 0 min   Financial Resource Strain   How hard is it for you to pay for the very basics like food, housing, medical care, and heating? Not hard   Housing Stability   In the last 12 months, was there a time when you were not able to pay the mortgage or rent on time? N   At any time in the past 12 months, were you homeless or living in a shelter (including now)? N   Transportation Needs   In the past 12 months, has lack of transportation kept you from medical appointments or from getting medications? no   In the past 12 months, has lack of transportation kept you from meetings, work, or from getting things needed for daily living? No   Food Insecurity   Within the past 12 months, you worried that your food would run out before you got the money to buy more. Never true   Within the past 12 months, the food you bought just didn't last and you didn't have money to get more. Never true   Stress   Do you feel stress - tense, restless, nervous, or anxious, or unable to sleep at night because your mind is troubled all the time - these days? Not at all   Social Isolation   How often do you feel lonely or isolated from those around you?  Never   Alcohol Use   Q1: How often do you have a drink containing alcohol? Never   Q2: How many drinks containing alcohol do you have on a typical day when you are drinking? None   Q3: How often do you have six or more drinks on one occasion? Never   OTHER   Name(s) of People in Home [facility] Staff

## 2025-04-24 NOTE — ASSESSMENT & PLAN NOTE
- resident of [facility]   - daughter shared concerns regarding pt's care and assistance with meals due to pt's blindness and weight loss

## 2025-04-24 NOTE — ASSESSMENT & PLAN NOTE
This 84 y.o. male presented with the following signs & symptoms of a UTI: abnormal smelling urine, foul smelling urine, and encephalopathy.  They do have signs/sxs that suggest infection extends beyond the bladder: malaise.    Objective UTI findings so far include: UA on admission.    The pt does have a recently-documented UTI confirmed with [facility]: Proteus mirabilis at [facility] in January.  Suspect this patient has Complicated cystitis.  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   If pt becomes febrile then draw 2 blood culture tubes, each from different site and initiate Sepsis protocols

## 2025-04-24 NOTE — ASSESSMENT & PLAN NOTE
Well-compensated on exam.  Holding home Coreg and amlodipine in setting of sepsis concern, will consider resuming when stable but more likely defer to PCP given how frail he has become (so as not to increase chances of orthostasis).

## 2025-04-24 NOTE — ASSESSMENT & PLAN NOTE
Anemia is likely due to chronic disease due to Chronic Kidney Disease. Most recent hemoglobin and hematocrit are listed below.  Recent Labs     04/23/25  1653   HGB 13.0*   HCT 41.5     Plan  - Monitor serial CBC: Daily  - Transfuse PRBC if patient becomes hemodynamically unstable, symptomatic or H/H drops below 7/21.  - Patient has not received any PRBC transfusions to date  - Patient's anemia is currently stable

## 2025-04-24 NOTE — PLAN OF CARE
Problem: Adult Inpatient Plan of Care  Goal: Plan of Care Review  Flowsheets (Taken 4/24/2025 1700)  Plan of Care Reviewed With: (daughter)   patient   child   other (see comments)  Goal: Absence of Hospital-Acquired Illness or Injury  Intervention: Identify and Manage Fall Risk  Flowsheets (Taken 4/24/2025 1700)  Safety Promotion/Fall Prevention:   bed alarm set   Fall Risk reviewed with patient/family   medications reviewed   lighting adjusted   side rails raised x 2  Intervention: Prevent Skin Injury  Flowsheets (Taken 4/24/2025 1700)  Body Position: turned  Intervention: Prevent and Manage VTE (Venous Thromboembolism) Risk  Flowsheets (Taken 4/24/2025 1700)  VTE Prevention/Management: ROM (active) performed  Goal: Optimal Comfort and Wellbeing  Intervention: Monitor Pain and Promote Comfort  Flowsheets (Taken 4/24/2025 1700)  Pain Management Interventions: pain management plan reviewed with patient/caregiver  Intervention: Provide Person-Centered Care  Flowsheets (Taken 4/24/2025 1700)  Trust Relationship/Rapport:   care explained   choices provided   questions answered   questions encouraged   reassurance provided   thoughts/feelings acknowledged     Problem: Infection  Goal: Absence of Infection Signs and Symptoms  Intervention: Prevent or Manage Infection  Flowsheets (Taken 4/24/2025 1700)  Infection Management: (oral antibiotic therapy)   aseptic technique maintained   cultures obtained and sent to lab   other (see comments)     Problem: Diabetes Comorbidity  Goal: Blood Glucose Level Within Targeted Range  Intervention: Monitor and Manage Glycemia  Flowsheets (Taken 4/24/2025 1700)  Glycemic Management: blood glucose monitored     Problem: Wound  Goal: Absence of Infection Signs and Symptoms  Intervention: Prevent or Manage Infection  Flowsheets (Taken 4/24/2025 1700)  Infection Management: (oral antibiotic therapy)   aseptic technique maintained   cultures obtained and sent to lab   other (see  comments)  Goal: Improved Oral Intake  Intervention: Promote and Optimize Oral Intake  Flowsheets (Taken 4/24/2025 1700)  Nutrition Interventions: supplemental drinks provided  Goal: Optimal Pain Control and Function  Intervention: Prevent or Manage Pain  Flowsheets (Taken 4/24/2025 1700)  Sleep/Rest Enhancement:   awakenings minimized   consistent schedule promoted  Pain Management Interventions: pain management plan reviewed with patient/caregiver  Goal: Skin Health and Integrity  Intervention: Optimize Skin Protection  Flowsheets (Taken 4/24/2025 1700)  Head of Bed (HOB) Positioning: HOB at 60-90 degrees  Goal: Optimal Wound Healing  Intervention: Promote Wound Healing  Flowsheets (Taken 4/24/2025 1700)  Sleep/Rest Enhancement:   awakenings minimized   consistent schedule promoted     Problem: Fall Injury Risk  Goal: Absence of Fall and Fall-Related Injury  Intervention: Identify and Manage Contributors  Flowsheets (Taken 4/24/2025 1700)  Self-Care Promotion: BADL personal routines maintained  Medication Review/Management: medications reviewed  Intervention: Promote Injury-Free Environment  Flowsheets (Taken 4/24/2025 1700)  Safety Promotion/Fall Prevention:   bed alarm set   Fall Risk reviewed with patient/family   medications reviewed   lighting adjusted   side rails raised x 2

## 2025-04-24 NOTE — CONSULTS
[facility] - Telemetry  Palliative Medicine  Consult Note    Patient Name: Magan Rose  MRN: [id number]  Admission Date: 2025  Hospital Length of Stay: 1 days  Code Status: Full Code   Attending Provider: Kiel Reyes MD  Consulting Provider: Brooke Verdugo NP  Primary Care Physician: Andrae Sanchez MD  Principal Problem:Complicated urinary tract infection    Patient information was obtained from patient, relative(s), past medical records, ER records, and primary team.      Inpatient consult to Palliative Care  Consult performed by: Brooke Verdugo NP  Consult ordered by: Kiel Reyes MD  Reason for consult: goals of care and advacned care planning        Assessment/Plan:   Palliative Care  Advance Care Planning   2025 - Consult   - consult received; interval chart reviewed in detail  - met with patient and daughter, Brooke, at bedside; introduction to palliative medicine team and role in current care and admission   - learned more about pt outside of current admission; he has been a resident of [facility] since [place] (); was a resident of a NH in [place] that closed due to [place]; pt's wife is  (associated with need for NH); pt has 2 adult children, Brooke and Magan; Brooke lives locally and Magan lives out of town   - children, Boroke and Magan, share legal surrogate decision making   - Brooke shares concerns and difficulty with working full time and limited availability to visit pt during the day, with visits often in evening and weekends, typically visits a few times a week; recently for approx 2 months Brooke was without a vehicle and had increased difficulty visiting pt at NH; in this time she reports pt lost a significant amount of weight and increase in wounds (see blindness)   - GOC/ACP discussion  - daughter considering DNR, as this aligns with her wishes for pt and feels that it aligns with what pt would advocate for himself if able; she plans to  discuss with Magan   - NH continues to be a necessity to meet pt's care needs  - reviewed philosophy of care of hospice; daughter considering addition of hospice care at NH; discussed plan for CM/SW to provide list of hospice agencies for review and coordinate informational meeting with preferred provider   - reviewed medicare rights related to hospice care   - emotional support provided   - Allowed time for questions/concerns; all addressed; expressed availability of myself/palliative team for additional questions/concerns   - updated MDT     Ophtho  Legally blind  - daughter shared lack of accomodation for pt's needs related to blindness; [place], palliative SW to follow up with [facility] to assess availability of resources at facility and update daughter    - likely contributing to weight loss/ poor PO intake as pt requires assistance/feeding with meals     Cardiac/Vascular  Chronic heart failure with preserved ejection fraction  - noted; contributes to appropriateness for hospice     Renal/  * Complicated urinary tract infection  - pt with chronic suprapubic catheter  - daughter shared concerns for cloudiness of pt's urine with NH care team over the last 2-3 weeks   - pt with recent history of sepsis related to UTI   - additional support and management of catheter with hospice specialized care could be beneficial in reducing infections and promptly treating them to avoid need for associated hospitalizations     Chronic indwelling Vargas catheter  - suprapubic catheter with frequent UTI (see UTI)     Endocrine  Severe protein-calorie malnutrition  - pt has lost 68 lbs since 6/24 (last prior recorded weight on file); 34% body weight loss in under a year   - blindness and need for assistance with feeding likely a contributing factor, especially given NH residency   - dementia also likely factor but pt willingly drank sips of boost and bites of soft foods with assistance from myself and daughter during consult  "visit   - pt did have difficulty with mechanics of intake and coughed with liquids; discussed SLP consult with Dr. Reyes, order placed      Other  Nursing home resident  - resident of [facility]   - daughter shared concerns regarding pt's care and assistance with meals due to pt's blindness and weight loss    Thank you for your consult. I will follow-up with patient. Please contact us if you have any additional questions.    Subjective:     HPI:   From H&P: "Magan Rose is a 84 y.o. male with  Indwelling suprapubic catheter for at least 4 years, Non-Insulin-dependent DM2, HTN, HLD, AFib not on AC, and   Dementia who presented to [facility] ED for eval and treatment of chronic decubitus ulcer and decreased p.o. intake.  In emergency department, patient denies any complaints. Dtr reports noticing urine has been cloudy for several weeks, as well as a decrease in appetite over the past month, and has lost a substantial amount of weight and overall decline in his health over the last 6 months ("200 lbs" was recorded about 10 months ago; pt appears much less than that on presentation).  Dtr also says that he has a wet cough that he has not been able to always cough up.   He was hospitalized at [facility] over the New Year with a Proteus mirabilis UTI.     ED course: Lactate 2.67.  UA obtained but still pending on admission; delayed by lab processing.  BP soft in ED and required supplemental O2, but no fever.  Exam with underweight appearance, disoriented.  Labs WBC 12.7 Hgb 13.  BUN 36 Crt 0.8.  BNP Trop WNL.  EKG Sinus tachycardia w freq PACs.  CXR without large consolidation.  ED treatments: , CTX 1g.  Suprapubic catheter exchanged.  They were admitted to [facility] for further evaluation and treatment."     Palliative medicine consulted for goals of care discussion and advance care planning; for details of visit, see advance care planning section of plan.       Hospital Course:  No notes on " file      Past Medical History:   Diagnosis Date    Anemia     Atrial flutter     Bacterial meningitis     BPH (benign prostatic hyperplasia)     CAD (coronary artery disease)     Cataract     CHF (congestive heart failure)     Dementia     Diabetes mellitus     Vargas catheter in place     GERD (gastroesophageal reflux disease)     Glaucoma     History of psychiatric hospitalization     sent to us from [facility]    Hyperlipidemia     Hypertension     Metabolic encephalopathy     Nursing home resident     Renal disorder     Schizoaffective disorder     Urinary retention     UTI (urinary tract infection)        Past Surgical History:   Procedure Laterality Date    CARDIAC CATHETERIZATION  09/07/2017    VESICOSTOMY N/A 9/27/2022    Procedure: CREATION, CYSTOSTOMY, cystoscopy, bladder stone removal possible laser lithotripsy;  Surgeon: Ruthy Guerra MD;  [facility]  Service: Urology;  Laterality: N/A;  holmium laser needed  RN PHONE PREOP DONE WITH [facility] HOME NURSE,   VACCINATED     Review of patient's allergies indicates:  No Known Allergies    Medications:  Continuous Infusions:  Scheduled Meds:   dorzolamide-timolol 2-0.5%  1 drop Both Eyes BID    ertapenem (INVanz) IV (PEDS and ADULTS)  1 g Intravenous Q24H    multivitamin  1 tablet Oral Daily    pantoprazole  40 mg Oral Daily    polyethylene glycol  17 g Oral Daily    senna-docusate  2 tablet Oral BID    sertraline  25 mg Oral Daily    traZODone  50 mg Oral QHS     PRN Meds:  Current Facility-Administered Medications:     acetaminophen, 650 mg, Oral, Q4H PRN    aluminum-magnesium hydroxide-simethicone, 30 mL, Oral, QID PRN    benzonatate, 100 mg, Oral, TID PRN    bisacodyL, 10 mg, Oral, Daily PRN    glucagon (human recombinant), 1 mg, Intramuscular, PRN    glucose, 16 g, Oral, PRN    glucose, 24 g, Oral, PRN    guaiFENesin 100 mg/5 ml, 200 mg, Oral, Q4H PRN    hydrALAZINE, 10 mg, Intravenous, Q6H PRN    insulin aspart U-100, 0-5 Units,  Subcutaneous, Q4H PRN    loperamide, 4 mg, Oral, Once PRN    melatonin, 6 mg, Oral, Nightly PRN    mineral oil, 1 enema, Rectal, Daily PRN    naloxone, 0.02 mg, Intravenous, PRN    ondansetron, 4 mg, Intravenous, Q6H PRN    prochlorperazine, 5 mg, Intravenous, Q6H PRN    sodium chloride 0.9%, 10 mL, Intravenous, Q12H PRN    Family History       Problem Relation (Age of Onset)    Diabetes Mother, Father, Sister, Brother    Heart disease Mother, Father, Sister, Brother    Hypertension Mother, Father          Tobacco Use    Smoking status: Former     Current packs/day: 0.00     Types: Cigarettes     Quit date: 2003     Years since quittin.7    Smokeless tobacco: Never   Substance and Sexual Activity    Alcohol use: No    Drug use: No    Sexual activity: Not Currently       Review of Systems   Unable to perform ROS: Dementia (denies pain)     Objective:     Vital Signs (Most Recent):  Temp: 97.3 °F (36.3 °C) (25 1126)  Pulse: 64 (25 1127)  Resp: 18 (25 1126)  BP: 131/75 (25 1126)  SpO2: 97 % (25 1126) Vital Signs (24h Range):  Temp:  [97.3 °F (36.3 °C)-98.6 °F (37 °C)] 97.3 °F (36.3 °C)  Pulse:  [] 64  Resp:  [17-31] 18  SpO2:  [96 %-100 %] 97 %  BP: ()/(55-84) 131/75     Weight: 60 kg (132 lb 4.4 oz)  Body mass index is 18.45 kg/m².       Physical Exam  Vitals and nursing note reviewed.   Constitutional:       Appearance: He is cachectic. He is ill-appearing.      Comments: Elderly, frail   HENT:      Head: Atraumatic.      Comments: Temporal wasting   Eyes:      Comments: Eyes closed (blind)    Neurological:      Mental Status: He is easily aroused.      Comments: Oriented to person, daughter, and location of hospital    Psychiatric:         Behavior: Behavior is cooperative.       Advance Care Planning   Advance Directives:   Living Will: Yes        Copy on chart: Yes    [place]: Yes    Do Not Resuscitate Status: No (daughter considering DNR, discussing with son)     Medical Power of : No (daughter and son share legal surrogate decision making)      Decision Making:  Family answered questions  Goals of Care: The family endorses that what is most important right now is to focus on remaining as independent as possible, symptom/pain control, and improvement in condition    Accordingly, we have decided that the best plan to meet the patient's goals includes continuing with treatment       Significant Labs: All pertinent labs within the past 24 hours have been reviewed.  CBC:   Recent Labs   Lab 04/24/25 0453   WBC 11.63   HGB 11.5*   HCT 37.5*   MCV 84        BMP:  Recent Labs   Lab 04/24/25 0453      K 4.8      CO2 22*   BUN 36*   CREATININE 0.7   CALCIUM 9.8   MG 2.3     LFT:  Lab Results   Component Value Date    AST 15 04/24/2025    ALKPHOS 96 04/24/2025    BILITOT 0.4 04/24/2025     Albumin:   Albumin   Date Value Ref Range Status   04/24/2025 2.8 (L) 3.5 - 5.2 g/dL Final   12/31/2024 3.6 3.4 - 5.0 g/dL Final   06/25/2024 3.6 3.5 - 5.2 g/dL Final     Protein:   Total Protein   Date Value Ref Range Status   06/25/2024 8.3 6.0 - 8.4 g/dL Final     Lactic acid:   Lab Results   Component Value Date    LACTATE 1.8 04/23/2025    LACTATE 1.4 03/21/2023     Significant Imaging: I have reviewed all pertinent imaging results/findings within the past 24 hours.      Total visit time: 90 minutes    70 min visit time including: face to face time in discussion of symptom assessment, and exploring options and burdens of offered treatments.  This also includes non-face to face time preparing to see the patient including chart review, obtaining and/or reviewing separately obtained history, documenting clinical information in the electronic or other health record, independently interpreting results and communicating results to the patient/family/caregiver, family discussions by phone if not able to be present, coordination of care with other specialists, and  discharge planning.     20 min ACP time spent: goals of care, advanced care planning, emotional support, formulating and communicating prognosis, exploring burden/ benefit of various approaches of treatment.       Brooke Verdugo NP  Palliative Medicine  [facility]

## 2025-04-24 NOTE — NURSING
[facility]  Nurses Note -- 4 Eyes      4/24/2025       Skin assessed on: Q Shift      [] No Pressure Injuries Present    []Prevention Measures Documented    [x] Yes LDA  for Pressure Injury Previously documented     [] Yes New Pressure Injury Discovered   [] LDA for New Pressure Injury Added      Attending RN:  Lizy Mars RN     Second RN:  MUKUND Seaman

## 2025-04-24 NOTE — ASSESSMENT & PLAN NOTE
"Patient's FSGs are controlled on current medication regimen.  Last A1c reviewed-   Lab Results   Component Value Date    HGBA1C 5.3 12/10/2020     Most recent fingerstick glucose reviewed- No results for input(s): "POCTGLUCOSE" in the last 24 hours.  Current correctional scale  low  Maintain anti-hyperglycemic dose as follows-   Antihyperglycemics (From admission, onward)      Start     Stop Route Frequency Ordered    04/23/25 2237  insulin aspart U-100 pen 0-5 Units         -- SubQ Every 4 hours PRN 04/23/25 2137          Hold Oral hypoglycemics while patient is in the hospital.  "

## 2025-04-24 NOTE — ASSESSMENT & PLAN NOTE
Anemia is likely due to chronic disease due to Chronic Kidney Disease. Most recent hemoglobin and hematocrit are listed below.  Recent Labs     04/23/25  1653 04/24/25  0453   HGB 13.0* 11.5*   HCT 41.5 37.5*     Plan  - Monitor serial CBC: Daily  - Transfuse PRBC if patient becomes hemodynamically unstable, symptomatic or H/H drops below 7/21.  - Patient has not received any PRBC transfusions to date  - Patient's anemia is currently stable

## 2025-04-24 NOTE — SUBJECTIVE & OBJECTIVE
Past Medical History:   Diagnosis Date    Anemia     Atrial flutter     Bacterial meningitis     BPH (benign prostatic hyperplasia)     CAD (coronary artery disease)     Cataract     CHF (congestive heart failure)     Dementia     Diabetes mellitus     Vargas catheter in place     GERD (gastroesophageal reflux disease)     Glaucoma     History of psychiatric hospitalization     sent to us from [facility]    Hyperlipidemia     Hypertension     Metabolic encephalopathy     Nursing home resident     Renal disorder     Schizoaffective disorder     Urinary retention     UTI (urinary tract infection)        Past Surgical History:   Procedure Laterality Date    CARDIAC CATHETERIZATION  09/07/2017    VESICOSTOMY N/A 9/27/2022    Procedure: CREATION, CYSTOSTOMY, cystoscopy, bladder stone removal possible laser lithotripsy;  Surgeon: Ruthy Guerra MD;  Location: [facility];  Service: Urology;  Laterality: N/A;  holmium laser needed  RN PHONE PREOP DONE WITH [facility] HOME NURSE,   VACCINATED     Review of patient's allergies indicates:  No Known Allergies    Medications:  Continuous Infusions:  Scheduled Meds:   dorzolamide-timolol 2-0.5%  1 drop Both Eyes BID    ertapenem (INVanz) IV (PEDS and ADULTS)  1 g Intravenous Q24H    multivitamin  1 tablet Oral Daily    pantoprazole  40 mg Oral Daily    polyethylene glycol  17 g Oral Daily    senna-docusate  2 tablet Oral BID    sertraline  25 mg Oral Daily    traZODone  50 mg Oral QHS     PRN Meds:  Current Facility-Administered Medications:     acetaminophen, 650 mg, Oral, Q4H PRN    aluminum-magnesium hydroxide-simethicone, 30 mL, Oral, QID PRN    benzonatate, 100 mg, Oral, TID PRN    bisacodyL, 10 mg, Oral, Daily PRN    glucagon (human recombinant), 1 mg, Intramuscular, PRN    glucose, 16 g, Oral, PRN    glucose, 24 g, Oral, PRN    guaiFENesin 100 mg/5 ml, 200 mg, Oral, Q4H PRN    hydrALAZINE, 10 mg, Intravenous, Q6H PRN    insulin aspart U-100, 0-5 Units, Subcutaneous, Q4H  PRN    loperamide, 4 mg, Oral, Once PRN    melatonin, 6 mg, Oral, Nightly PRN    mineral oil, 1 enema, Rectal, Daily PRN    naloxone, 0.02 mg, Intravenous, PRN    ondansetron, 4 mg, Intravenous, Q6H PRN    prochlorperazine, 5 mg, Intravenous, Q6H PRN    sodium chloride 0.9%, 10 mL, Intravenous, Q12H PRN    Family History       Problem Relation (Age of Onset)    Diabetes Mother, Father, Sister, Brother    Heart disease Mother, Father, Sister, Brother    Hypertension Mother, Father          Tobacco Use    Smoking status: Former     Current packs/day: 0.00     Types: Cigarettes     Quit date: 2003     Years since quittin.7    Smokeless tobacco: Never   Substance and Sexual Activity    Alcohol use: No    Drug use: No    Sexual activity: Not Currently       Review of Systems   Unable to perform ROS: Dementia (denies pain)     Objective:     Vital Signs (Most Recent):  Temp: 97.3 °F (36.3 °C) (25 1126)  Pulse: 64 (25 1127)  Resp: 18 (25 1126)  BP: 131/75 (25 1126)  SpO2: 97 % (25 1126) Vital Signs (24h Range):  Temp:  [97.3 °F (36.3 °C)-98.6 °F (37 °C)] 97.3 °F (36.3 °C)  Pulse:  [] 64  Resp:  [17-31] 18  SpO2:  [96 %-100 %] 97 %  BP: ()/(55-84) 131/75     Weight: 60 kg (132 lb 4.4 oz)  Body mass index is 18.45 kg/m².       Physical Exam  Vitals and nursing note reviewed.   Constitutional:       Appearance: He is cachectic. He is ill-appearing.      Comments: Elderly, frail   HENT:      Head: Atraumatic.      Comments: Temporal wasting   Eyes:      Comments: Eyes closed (blind)    Neurological:      Mental Status: He is easily aroused.      Comments: Oriented to person, daughter, and location of hospital    Psychiatric:         Behavior: Behavior is cooperative.       Advance Care Planning   Advance Directives:   Living Will: Yes        Copy on chart: Yes    LaPOST: Yes    Do Not Resuscitate Status: No (daughter considering DNR, discussing with son)    Medical Power of  : No (daughter and son share legal surrogate decision making)      Decision Making:  Family answered questions  Goals of Care: The family endorses that what is most important right now is to focus on remaining as independent as possible, symptom/pain control, and improvement in condition    Accordingly, we have decided that the best plan to meet the patient's goals includes continuing with treatment       Significant Labs: All pertinent labs within the past 24 hours have been reviewed.  CBC:   Recent Labs   Lab 04/24/25 0453   WBC 11.63   HGB 11.5*   HCT 37.5*   MCV 84        BMP:  Recent Labs   Lab 04/24/25 0453      K 4.8      CO2 22*   BUN 36*   CREATININE 0.7   CALCIUM 9.8   MG 2.3     LFT:  Lab Results   Component Value Date    AST 15 04/24/2025    ALKPHOS 96 04/24/2025    BILITOT 0.4 04/24/2025     Albumin:   Albumin   Date Value Ref Range Status   04/24/2025 2.8 (L) 3.5 - 5.2 g/dL Final   12/31/2024 3.6 3.4 - 5.0 g/dL Final   06/25/2024 3.6 3.5 - 5.2 g/dL Final     Protein:   Total Protein   Date Value Ref Range Status   06/25/2024 8.3 6.0 - 8.4 g/dL Final     Lactic acid:   Lab Results   Component Value Date    LACTATE 1.8 04/23/2025    LACTATE 1.4 03/21/2023     Significant Imaging: I have reviewed all pertinent imaging results/findings within the past 24 hours.

## 2025-04-25 ENCOUNTER — DOCUMENTATION ONLY (OUTPATIENT)
Dept: PALLIATIVE MEDICINE | Facility: HOSPITAL | Age: 85
End: 2025-04-25
Payer: MEDICARE

## 2025-04-25 NOTE — CONSULTS
[facility]  [facility]ult Nutrition  Consult Note    SUMMARY     Recommendations    Recommendation:   1. Continue current diet as tolerated, texture/consistency modifications per SLP/MD.   2. Continue to provide Boost Plus TID, offer more often if pt accepting/willing to consume. Addition of Cam BID to promote wound healing.   3. Provide assistance with all meals and ONS.   4. Monitor weight/labs.   5. RD to follow and monitor nutrition status    Goals:   Pt intake >/= 50% EEN/EPN by RD follow up    Nutrition Goal Status: new  Communication of RD Recs: other (comment) (POC)    Nutrition Discharge Planning     Nutrition Discharge Planning: Too early to determine, pending clinical course    Assessment and Plan    Endocrine  Severe protein-calorie malnutrition  Malnutrition Type:  Context: chronic illness  Level: severe    Related to (etiology):   Dementia/physiological state    Signs and Symptoms (as evidenced by):   Pt with dementia, legally blind, weight loss of 34% in 9 months (68 lbs), Hesham Score 10 with multiple pressure injuries      Malnutrition Characteristic Summary:  Weight Loss (Malnutrition): greater than 20% in 1 year  Energy Intake (Malnutrition): less than 75% for greater than or equal to 3 months      Interventions:  Collaboration with other providers  Commercial Beverage: Boost Plus TID, or more   Modified Beverage: Cam BID       Nutrition Diagnosis Status:   New         Malnutrition Assessment  Malnutrition Context: chronic illness  Malnutrition Level: severe          Weight Loss (Malnutrition): greater than 20% in 1 year  Energy Intake (Malnutrition): less than 75% for greater than or equal to 3 months                         Reason for Assessment    Reason For Assessment: consult (unstageble wounds to sacrum; decreased intake with increased loss of weight.)  Diagnosis: infection/sepsis (Complicated urinary tract infection)  General Information Comments: Pt admitted with Complicated  "urinary tract infection. Legally blind with dementia, needs assistance with feeding. Willingly will drink Boost and bites of soft foods per MD note. "pt has lost 68 lbs since 6/24 (last prior recorded weight on file); 34% body weight loss in under a year." Pending SLP eval. Receiving Full liquid diet with Boost Plus TID. PIV. Hesham Score: 10 pressure injury sacaral spine, right and left buttocks, left heel. NFPE not completed 2/2 remote interim coverage, severe malnutrition criteria met without NFPE    Nutrition/Diet History    Food Preferences: СЕРГЕЙ  Food Allergies: NKFA  Factors Affecting Nutritional Intake: decreased appetite, impaired cognitive status/motor control, difficulty/impaired swallowing, inability to feed self    Anthropometrics    Height: 5' 11" (180.3 cm)  Height (inches): 71 in  Weight: 60 kg (132 lb 4.4 oz)  Weight (lb): 132.28 lb  Weight Method: Bed Scale  Ideal Body Weight (IBW), Male: 172 lb  % Ideal Body Weight, Male (lb): 76.91 %  BMI (Calculated): 18.5  BMI Grade: less than 18.5 - underweight       Lab/Procedures/Meds    Pertinent Labs Reviewed: reviewed  Pertinent Labs Comments: CO2 22, BUN 36, Glu 112, total pro 8.7, Alb 2.8, ALT 5  Pertinent Medications Reviewed: reviewed  Pertinent Medications Comments: Invanz, MVI, pantoprazole, poly glycol, senna-docusate, sertraline, trazodone    Estimated/Assessed Needs    Weight Used For Calorie Calculations: 60 kg (132 lb 4.4 oz)  Energy Calorie Requirements (kcal): 3234-7124  Energy Need Method: Kcal/kg (35-40 kcal/kg)  Protein Requirements: 78 (1.3 gm/kg)  Weight Used For Protein Calculations: 60 kg (132 lb 4.4 oz)     Estimated Fluid Requirement Method: RDA Method (or PER MD)  RDA Method (mL): 2100         Nutrition Prescription Ordered    Current Diet Order: Full Liquid Diet  Oral Nutrition Supplement: Boost Plus TID    Evaluation of Received Nutrient/Fluid Intake    I/O: 500/200  Energy Calories Required: not meeting needs  Protein Required: " not meeting needs  Fluid Required: not meeting needs  Comments: LBM 4/24  % Intake of Estimated Energy Needs: 0 - 25 %  % Meal Intake: 0 - 25 %    Nutrition Risk    Level of Risk/Frequency of Follow-up:  (2x/week)       Monitor and Evaluation    Monitor and Evaluation: Energy intake, Food and beverage intake, Weight, Electrolyte and renal panel, Gastrointestinal profile, Glucose/endocrine profile, Inflammatory profile, Lipid profile, Diet order     Nutrition Related Social Determinants of Health: SDOH: Other: NH resident       Nutrition Follow-Up    RD Follow-up?: Yes

## 2025-04-25 NOTE — PROGRESS NOTES
Reached out to the daughter after talking with [facility] to encourage a conversation with the hospice social worker to ensure appropriate accommodations are in place for the patient due to their blindness.

## 2025-04-25 NOTE — PT/OT/SLP EVAL
Speech Language Pathology Evaluation  Bedside Swallow    Patient Name:  Magan Rose   MRN:  [id number]  Admitting Diagnosis: Complicated urinary tract infection    Recommendations:                 General Recommendations:   ST f/u x1  Diet recommendations:   , Full liquids, Thin liquids - IDDSI Level 0   Aspiration Precautions: 1 bite/sip at a time, Alternating bites/sips, Assistance with meals, HOB to 90 degrees, Meds crushed in puree, and Small bites/sips   General Precautions: Standard,    Communication strategies:  provide increased time to answer; PT IS LEGALLY BLIND    Assessment:     Magan Rose is a 84 y.o. male with a Complicated urinary tract infection, who presents w/  increased WOB at rest and w/ intake, which has negatively impacted swallowing/breathing coordination w/ intake. ST recs cont conservative diet of Full liquids w/ thin liquids via tsp. Meds crushed in pudding or applesauce. ST to f/u x1 to ensure tolerance.     History:     Past Medical History:   Diagnosis Date    Anemia     Atrial flutter     Bacterial meningitis     BPH (benign prostatic hyperplasia)     CAD (coronary artery disease)     Cataract     CHF (congestive heart failure)     Dementia     Diabetes mellitus     Vargas catheter in place     GERD (gastroesophageal reflux disease)     Glaucoma     History of psychiatric hospitalization     sent [facility] from [facility]    Hyperlipidemia     Hypertension     Metabolic encephalopathy     Nursing home resident     Renal disorder     Schizoaffective disorder     Urinary retention     UTI (urinary tract infection)        Past Surgical History:   Procedure Laterality Date    CARDIAC CATHETERIZATION  09/07/2017    VESICOSTOMY N/A 9/27/2022    Procedure: CREATION, CYSTOSTOMY, cystoscopy, bladder stone removal possible laser lithotripsy;  Surgeon: Ruthy Guerra MD;  Location: [facility] OR;  Service: Urology;  Laterality: N/A;  holmium laser needed  RN PHONE PREOP DONE WITH [facility] HOME NURSE,    VACCINATED       Social History: Patient lives at [facility].    Prior Intubation HX:  None    Modified Barium Swallow: None per EMR      Chest X-Rays:  4/25/25    Impression:     1. Chronic appearing interstitial findings, no large focal consolidation.    Prior diet: Dcr appetite and intake recently, per dtr.    Subjective     ST obtained clearance from pt's nurse for b/s swallow evaluation prior to entrance. Pt was asleep upon ST's entrance, however, easily arouse to ST's entrance. ST noting pt accessory muscle breathing at rest, increasing w/ po intake.     Patient goals: Pt did not state     Pain/Comfort:  Pain Rating 1: 0/10    Respiratory Status: Nasal cannula, flow 2 L/min    Objective:     Oral Musculature Evaluation  Oral Musculature: general weakness  Dentition: scattered dentition, teeth in poor condition  Secretion Management: adequate  Mucosal Quality: good  Oral Labial Strength and Mobility: WFL  Lingual Strength and Mobility: WFL  Voice Prior to PO Intake: Dcr intensity, breathy    Bedside Swallow Eval:   Consistencies Assessed:  Thin liquids -Tsp sips of water  Puree -x5 tbsp bites of pudding      Oral Phase:   Open-mouth breathing w/ oral prep  Prolonged mastication  Slow oral transit time    Pharyngeal Phase:   decreased hyolaryngeal excursion to palpation  delayed swallow initation  no overt clinical signs/symptoms of aspiration  multiple spontaneous swallows    Compensatory Strategies  None    Treatment: It should be noted that silent aspiration cannot be r/o via b/s assessment. ST w/ extensive education w/ the pt's dtr upon exiting the room regarding the following: ST POC, diet recs, swallowing precautions, as well as med administration. Pt's dtr v/u of all recs and was agreeable to all recs.     Goals:   Multidisciplinary Problems       SLP Goals          Problem: SLP    Goal Priority Disciplines Outcome   SLP Goal    Low SLP Progressing   Description: Goals:  1. Pt will participate in on-going  assessment of swallowing function.                         Plan:     Patient to be seen:  2 x/week   Plan of Care expires:     Plan of Care reviewed with:  patient, daughter   SLP Follow-Up:  Yes       Discharge recommendations:  No Therapy Indicated   Barriers to Discharge:  None    Time Tracking:     SLP Treatment Date:   04/25/25  Speech Start Time:  0939  Speech Stop Time:  1003     Speech Total Time (min):  24 min    Billable Minutes: Eval Swallow and Oral Function 10 and Self Care/Home Management Training 14    04/25/2025

## 2025-04-25 NOTE — ASSESSMENT & PLAN NOTE
This 84 y.o. male presented with the following signs & symptoms of a UTI: abnormal smelling urine, foul smelling urine, and encephalopathy.  They do have signs/sxs that suggest infection extends beyond the bladder: malaise.    Objective UTI findings so far include: UA on admission.    The pt does have a recently-documented UTI confirmed with [facility]: Proteus [facility]abilis [facility] [place].  Suspect this patient has Complicated cystitis.  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   If pt becomes febrile then draw 2 blood culture tubes, each from different site and initiate Sepsis protocols

## 2025-04-25 NOTE — PLAN OF CARE
JAMARI discussed discharge planning with patient's daughter, Brooke. Brooke has decided to move forward with [facility]. JAMARI sent referral to [facility] via [employer].     I provided the patient a choice of post acute providers and offered a list of CMS rated Hospice     Patient/family chose the following as their preferred providers:  1.[facility]     Informed patient and family that placement is based on medical acceptance, insurance authorization and staff availability.      2:51 pm JAMARI spoke with Elo with [facility]. Elo will contact patient's daughter, Brooke.     3:08 pm Elo contacted JAMARI and informed CM that she has spoken with Brooke, admissions documents will be signed Monday.     CM will continue to follow patient for discharge needs.

## 2025-04-25 NOTE — SUBJECTIVE & OBJECTIVE
Medications:  Continuous Infusions:  Scheduled Meds:   dorzolamide-timolol 2-0.5%  1 drop Both Eyes BID    ertapenem (INVanz) IV (PEDS and ADULTS)  1 g Intravenous Q24H    multivitamin  1 tablet Oral Daily    pantoprazole  40 mg Oral Daily    polyethylene glycol  17 g Oral Daily    senna-docusate  2 tablet Oral BID    sertraline  25 mg Oral Daily    traZODone  50 mg Oral QHS     PRN Meds:  Current Facility-Administered Medications:     acetaminophen, 650 mg, Oral, Q4H PRN    aluminum-magnesium hydroxide-simethicone, 30 mL, Oral, QID PRN    benzonatate, 100 mg, Oral, TID PRN    bisacodyL, 10 mg, Oral, Daily PRN    glucagon (human recombinant), 1 mg, Intramuscular, PRN    glucose, 16 g, Oral, PRN    glucose, 24 g, Oral, PRN    guaiFENesin 100 mg/5 ml, 200 mg, Oral, Q4H PRN    hydrALAZINE, 10 mg, Intravenous, Q6H PRN    insulin aspart U-100, 0-5 Units, Subcutaneous, Q4H PRN    loperamide, 4 mg, Oral, Once PRN    melatonin, 6 mg, Oral, Nightly PRN    mineral oil, 1 enema, Rectal, Daily PRN    naloxone, 0.02 mg, Intravenous, PRN    ondansetron, 4 mg, Intravenous, Q6H PRN    prochlorperazine, 5 mg, Intravenous, Q6H PRN    sodium chloride 0.9%, 10 mL, Intravenous, Q12H PRN    Objective:     Vital Signs (Most Recent):  Temp: 97.8 °F (36.6 °C) (04/25/25 1129)  Pulse: 63 (04/25/25 1129)  Resp: 18 (04/25/25 1129)  BP: 137/71 (04/25/25 1129)  SpO2: 96 % (04/25/25 1129) Vital Signs (24h Range):  Temp:  [97.2 °F (36.2 °C)-98.7 °F (37.1 °C)] 97.8 °F (36.6 °C)  Pulse:  [60-72] 63  Resp:  [16-18] 18  SpO2:  [91 %-100 %] 96 %  BP: (117-150)/(62-81) 137/71     Weight: 65.2 kg (143 lb 11.8 oz)  Body mass index is 20.05 kg/m².       Physical Exam  Vitals and nursing note reviewed.   Constitutional:       Appearance: He is cachectic. He is ill-appearing.      Comments: Elderly, frail   HENT:      Head: Atraumatic.      Comments: Temporal wasting   Eyes:      Comments: Eyes closed (blind)    Neurological:      Mental Status: He is  "easily aroused.      Comments: Oriented to person, daughter, and location of hospital    Psychiatric:         Behavior: Behavior is cooperative.              Advance Care Planning   Advance Directives:   Living Will: Yes        Copy on chart: Yes    LaPOST: Yes (needs LAPOST prior to discharge; will coordinate with hospice team)    Do Not Resuscitate Status: Yes (daughter considering DNR, discussing with son)    Medical Power of : No (daughter and son share legal surrogate decision making)      Decision Making:  Family answered questions  Goals of Care: What is most important right now is to focus on remaining as independent as possible, symptom/pain control, improvement in condition but with limits to invasive therapies. Accordingly, we have decided that the best plan to meet the patient's goals includes continuing with treatment during admission and enrolling in hospice care upon discharge.        Significant Labs: All pertinent labs within the past 24 hours have been reviewed.  CBC:   Recent Labs   Lab 04/24/25  0453 04/25/25  0808   WBC 11.63 9.17   HGB 11.5* 11.3*   HCT 37.5* 36.5*   MCV 84 85     --      BMP:  No results for input(s): "GLU", "NA", "K", "CL", "CO2", "BUN", "CREATININE", "CALCIUM", "MG" in the last 24 hours.  LFT:  Lab Results   Component Value Date    AST 15 04/24/2025    ALKPHOS 96 04/24/2025    BILITOT 0.4 04/24/2025     Albumin:   Albumin   Date Value Ref Range Status   04/24/2025 2.8 (L) 3.5 - 5.2 g/dL Final   12/31/2024 3.6 3.4 - 5.0 g/dL Final   06/25/2024 3.6 3.5 - 5.2 g/dL Final     Protein:   Total Protein   Date Value Ref Range Status   06/25/2024 8.3 6.0 - 8.4 g/dL Final     Lactic acid:   Lab Results   Component Value Date    LACTATE 1.8 04/23/2025    LACTATE 1.4 03/21/2023       Significant Imaging: I have reviewed all pertinent imaging results/findings within the past 24 hours.  "

## 2025-04-25 NOTE — ASSESSMENT & PLAN NOTE
- daughter shared lack of accomodation for pt's needs related to blindness; Carlota, palliative SW to follow up with [facility] to assess availability of resources at facility and update daughter    - likely contributing to weight loss/ poor PO intake as pt requires assistance/feeding with meals

## 2025-04-25 NOTE — ASSESSMENT & PLAN NOTE
2025  - interval chart reviewed  - met with pt and son, Magan, at bedside  - reviewed discussions from  with daughter Brooke, with son Magan; he confirms their discussion of below and their shared agreement with pt being DNR/DNI and for continued treatment while inpatient for optimization and exploring hospice options for plan for NH with hospice at discharge  - offered to discuss with Brooke again if needed; Magan declined and confirmed they were in communication and on same page; reassured that she could contact me if any questions regarding palliative plan as my card/contact info was provided prior     - confirmed with Magan that DNR order would be placed in pt's chart; in agreement   - emotional support provided; answered all questions and expressed continued palliative availability   - with hospice pending will have Carlota palliative SW, coordinate with selected hospice team for completion of LAPOST and or obtaining copy for EMR if completed by hospice team   - discussed pt with palliative SW     2025 - Consult   - consult received; interval chart reviewed in detail  - met with patient and daughter, Brooke, at bedside; introduction to palliative medicine team and role in current care and admission   - learned more about pt outside of current admission; he has been a resident of [facility] since [place] (); was a resident of a NH in [place] that closed due to [place]; pt's wife is  (associated with need for NH); pt has 2 adult children, Brooke and Magan; Brooke lives locally and Magan lives out of town   - children, Brooke and Magan, share legal surrogate decision making   - Brooke shares concerns and difficulty with working full time and limited availability to visit pt during the day, with visits often in evening and weekends, typically visits a few times a week; recently for approx 2 months Brooke was without a vehicle and had increased difficulty visiting  pt at NH; in this time she reports pt lost a significant amount of weight and increase in wounds (see blindness)   - GOC/ACP discussion  - daughter considering DNR, as this aligns with her wishes for pt and feels that it aligns with what pt would advocate for himself if able; she plans to discuss with Magan   - NH continues to be a necessity to meet pt's care needs  - reviewed philosophy of care of hospice; daughter considering addition of hospice care at NH; discussed plan for CM/SW to provide list of hospice agencies for review and coordinate informational meeting with preferred provider   - reviewed medicare rights related to hospice care   - emotional support provided   - Allowed time for questions/concerns; all addressed; expressed availability of myself/palliative team for additional questions/concerns   - updated MDT

## 2025-04-25 NOTE — ASSESSMENT & PLAN NOTE
Nutrition consulted. Most recent weight and BMI monitored-     Measurements:  Wt Readings from Last 1 Encounters:   04/25/25 65.2 kg (143 lb 11.8 oz)   Body mass index is 20.05 kg/m².    Patient has been screened and assessed by RD.    Malnutrition Type:  Context: chronic illness  Level: severe    Malnutrition Characteristic Summary:  Weight Loss (Malnutrition): greater than 20% in 1 year  Energy Intake (Malnutrition): less than 75% for greater than or equal to 3 months    Interventions/Recommendations (treatment strategy):  1. Continue current diet as tolerated, texture/consistency modifications per SLP/MD. 2. Continue to provide Boost Plus TID, offer more often if pt accepting/willing to consume.  Addition of Cam BID to promote wound healing.  3. Provide assistance with all meals and ONS. 4. Monitor weight/labs. 5. RD to follow and monitor nutrition status

## 2025-04-25 NOTE — ASSESSMENT & PLAN NOTE
Patient's FSGs are controlled on current medication regimen.  Last A1c reviewed-   Lab Results   Component Value Date    HGBA1C 5.3 12/10/2020     Most recent fingerstick glucose reviewed-   Recent Labs   Lab 04/24/25  1125 04/24/25  1610 04/24/25  1949 04/25/25  0724   POCTGLUCOSE 117* 99 124* 92     Current correctional scale  low  Maintain anti-hyperglycemic dose as follows-   Antihyperglycemics (From admission, onward)      Start     Stop Route Frequency Ordered    04/23/25 2237  insulin aspart U-100 pen 0-5 Units         -- SubQ Every 4 hours PRN 04/23/25 2137          Hold Oral hypoglycemics while patient is in the hospital.
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
## 2025-04-25 NOTE — ASSESSMENT & PLAN NOTE
Malnutrition Type:  Context: chronic illness  Level: severe    Related to (etiology):   Dementia/physiological state    Signs and Symptoms (as evidenced by):   Pt with dementia, legally blind, weight loss of 34% in 9 months (68 lbs), Hesham Score 10 with multiple pressure injuries      Malnutrition Characteristic Summary:  Weight Loss (Malnutrition): greater than 20% in 1 year  Energy Intake (Malnutrition): less than 75% for greater than or equal to 3 months      Interventions:  Collaboration with other providers  Commercial Beverage: Boost Plus TID, or more   Modified Beverage: Cam BID       Nutrition Diagnosis Status:   New

## 2025-04-25 NOTE — PROGRESS NOTES
"[facility] Medicine  Progress Note    Patient Name: Magan Rose  MRN: [id number]  Patient Class: IP- Inpatient   Admission Date: 4/23/2025  Length of Stay: 2 days  Attending Physician: Kiel Reyes MD  Primary Care Provider: Andrae Sanchez MD        Subjective     Principal Problem:Complicated urinary tract infection        HPI:  Magan Rose is a 84 y.o. male with  Indwelling suprapubic catheter for at least 4 years, Non-Insulin-dependent DM2, HTN, HLD, AFib not on AC, and   Dementia who presented to [facility] ED for eval and treatment of chronic decubitus ulcer and decreased p.o. intake.  In emergency department, patient denies any complaints. Dtr reports noticing urine has been cloudy for several weeks, as well as a decrease in appetite over the past month, and has lost a substantial amount of weight and overall decline in his health over the last 6 months ("200 lbs" was recorded about 10 months ago; pt appears much less than that on presentation).  Dtr also says that he has a wet cough that he has not been able to always cough up.   He was hospitalized at [facility] over the New Year with a Proteus mirabilis UTI.    ED course: Lactate 2.67.  UA obtained but still pending on admission; delayed by lab processing.  BP soft in ED and required supplemental O2, but no fever.  Exam with underweight appearance, disoriented.  Labs WBC 12.7 Hgb 13.  BUN 36 Crt 0.8.  BNP Trop WNL.  EKG Sinus tachycardia w freq PACs.  CXR without large consolidation.  ED treatments: , CTX 1g.  Suprapubic catheter exchanged.  They were admitted to [facility] for further evaluation and treatment.        Overview/Hospital Course:  Admitted from with decreased oral intake, and FTT with weight loss. Found to have UTI, has had recent ESBL Ecoli, will start Ertapenem until blood cx and urine cx result. Palliative care consult. Wound care consult for sacral wound, as well as buttocks and heal. Appears " to have lost 68 lbs this year, daughter says it has been in the last month or two which is highly concerning for malignancy vs advanced dementia FTT. I spoke about code status with her and she would like to continue that conversation with palliative team today.      E.coli and GBS in urine. Previous E.coli was ESBL, ertapenem should cover both for now.    Wt Readings from Last 4 Encounters:   04/23/25 60 kg (132 lb 4.4 oz)   06/23/24 90.7 kg (200 lb)   09/03/23 90.7 kg (200 lb)   07/10/23 90.7 kg (199 lb 15.3 oz)         Interval History:  NAEON.  No new issues.   CC- Gen Fatigue  All questions answered and updates on care given.       ROS:  Dementia     Vitals:    04/25/25 0447 04/25/25 0551 04/25/25 0643 04/25/25 0728   BP: (!) 142/75   (!) 150/81   BP Location:    Left arm   Patient Position:    Sitting   Pulse: 70  70 67   Resp: 18   17   Temp: 98.7 °F (37.1 °C)   97.2 °F (36.2 °C)   TempSrc:    Axillary   SpO2: 100%   100%   Weight:  65.2 kg (143 lb 11.8 oz)     Height:              Body mass index is 20.05 kg/m².      PHYSICAL EXAM:  GENERAL APPEARANCE: alert and cooperative. Frail     HEAD: NC/AT  CARDIAC: There is no cyanosis or pallor.   LUNGS: No apparent wheezing or stridor.  ABDOMEN: Non-distended. No guarding.  MSK: No joint erythema or tenderness.           Recent Results (from the past 24 hours)   PSA, Total and Free    Collection Time: 04/24/25  8:03 AM   Result Value Ref Range    Prostate Specific Antigen 2.25 <=4.00 ng/mL    Prostate Specific Antigen Free 0.89 <=1.50 ng/mL    PSA % Free 39.56 Not established %   Cancer Antigen 19-9    Collection Time: 04/24/25  8:03 AM   Result Value Ref Range    CA 19-9 <2.1 <=40.0 U/mL   AFP Tumor Marker    Collection Time: 04/24/25  8:03 AM   Result Value Ref Range    AFP 2.5 <=8.4 ng/mL   POCT glucose    Collection Time: 04/24/25 11:25 AM   Result Value Ref Range    POCT Glucose 117 (H) 70 - 110 mg/dL   POCT glucose    Collection Time: 04/24/25  4:10 PM    Result Value Ref Range    POCT Glucose 99 70 - 110 mg/dL   POCT glucose    Collection Time: 04/24/25  7:49 PM   Result Value Ref Range    POCT Glucose 124 (H) 70 - 110 mg/dL   POCT glucose    Collection Time: 04/25/25  7:24 AM   Result Value Ref Range    POCT Glucose 92 70 - 110 mg/dL       Microbiology Results (last 7 days)       Procedure Component Value Units Date/Time    Blood culture x two cultures. Draw prior to antibiotics. [[id number]]  (Normal) Collected: 04/23/25 1653    Order Status: Completed Specimen: Blood from Peripheral, Forearm, Right Updated: 04/25/25 0601     Blood Culture No Growth After 36 Hours    Blood culture x two cultures. Draw prior to antibiotics. [[id number]]  (Normal) Collected: 04/23/25 1653    Order Status: Completed Specimen: Blood from Peripheral, Forearm, Left Updated: 04/25/25 0601     Blood Culture No Growth After 36 Hours    Urine culture [[id number]]  (Abnormal) Collected: 04/23/25 2023    Order Status: Completed Specimen: Urine, Clean Catch Updated: 04/24/25 1204     Urine Culture >100,000 cfu/ml Presumptive E Coli      50,000 - 99,999 cfu/ml Streptococcus agalactiae (Group B)    Clostridium difficile EIA [[id number]]     Order Status: Sent Specimen: Stool              Imaging Results              X-Ray Chest AP Portable (Final result)  Result time 04/23/25 17:32:06      Final result by Johnny Thomas MD (04/23/25 17:32:06)                   Impression:      1. Chronic appearing interstitial findings, no large focal consolidation.      Electronically signed by: Johnny Thomas MD  Date:    04/23/2025  Time:    17:32               Narrative:    EXAMINATION:  XR CHEST AP PORTABLE    CLINICAL HISTORY:  Sepsis;    TECHNIQUE:  Single frontal view of the chest was performed.    COMPARISON:  06/24/2024    FINDINGS:  The cardiomediastinal silhouette is not enlarged noting tortuosity of the aorta..  There is no pleural effusion.  The trachea is midline.  The lungs are  symmetrically expanded bilaterally with coarse interstitial attenuation bilaterally..  No large focal consolidation seen.  There is no pneumothorax.  The osseous structures are remarkable for degenerative change and osteopenia.  There is gas and stool distention of the bowel..                                           Assessment & Plan  Complicated urinary tract infection  This 84 y.o. male presented with the following signs & symptoms of a UTI: abnormal smelling urine, foul smelling urine, and encephalopathy .  They do have signs/sxs that suggest infection extends beyond the bladder: malaise.    Objective UTI findings so far include:  UA on admission .    The pt does have a recently-documented UTI confirmed with UCx: Proteus mirabilis at [facility] in January.  Suspect this patient has Complicated cystitis.  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   If pt becomes febrile then draw 2 blood culture tubes, each from different site and initiate Sepsis protocols      Chronic indwelling Vargas catheter  Per UTI.    Type 2 diabetes mellitus, controlled  Patient's FSGs are controlled on current medication regimen.  Last A1c reviewed-   Lab Results   Component Value Date    HGBA1C 5.3 12/10/2020     Most recent fingerstick glucose reviewed-   Recent Labs   Lab 04/24/25  1125 04/24/25  1610 04/24/25  1949 04/25/25  0724   POCTGLUCOSE 117* 99 124* 92     Current correctional scale   low  Maintain anti-hyperglycemic dose as follows-   Antihyperglycemics (From admission, onward)      Start     Stop Route Frequency Ordered    04/23/25 2237  insulin aspart U-100 pen 0-5 Units         -- SubQ Every 4 hours PRN 04/23/25 2137          Hold Oral hypoglycemics while patient is in the hospital.  Chronic heart failure with preserved ejection fraction  Well-compensated on exam.  Holding home Coreg and amlodipine in setting of sepsis concern, will consider  resuming when stable but more likely defer to PCP given how frail he has become (so as not to increase chances of orthostasis).    Essential hypertension  Per HFpEF.  Non-occlusive coronary artery disease  Not on statin, ASA, or Plavix.  On Tele, WCTM.    Anemia of chronic disease  Anemia is likely due to chronic disease due to Chronic Kidney Disease. Most recent hemoglobin and hematocrit are listed below.  Recent Labs     04/23/25  1653 04/24/25  0453   HGB 13.0* 11.5*   HCT 41.5 37.5*     Plan  - Monitor serial CBC: Daily  - Transfuse PRBC if patient becomes hemodynamically unstable, symptomatic or H/H drops below 7/21.  - Patient has not received any PRBC transfusions to date  - Patient's anemia is currently stable      Legally blind  Will use frequent verbal assurances and cues, and assist with feeding      Nursing home resident  CM/SW consult to assist with his return to [facility] when ready.      ACP (advance care planning)      Severe protein-calorie malnutrition  Nutrition consulted. Most recent weight and BMI monitored-     Measurements:  Wt Readings from Last 1 Encounters:   04/25/25 65.2 kg (143 lb 11.8 oz)   Body mass index is 20.05 kg/m².    Patient has been screened and assessed by RD.    Malnutrition Type:  Context: chronic illness  Level: severe    Malnutrition Characteristic Summary:  Weight Loss (Malnutrition): greater than 20% in 1 year  Energy Intake (Malnutrition): less than 75% for greater than or equal to 3 months    Interventions/Recommendations (treatment strategy):  1. Continue current diet as tolerated, texture/consistency modifications per SLP/MD. 2. Continue to provide Boost Plus TID, offer more often if pt accepting/willing to consume.  Addition of Cam BID to promote wound healing.  3. Provide assistance with all meals and ONS. 4. Monitor weight/labs. 5. RD to follow and monitor nutrition status      VTE Risk Mitigation (From admission, onward)           Ordered     IP VTE HIGH RISK  PATIENT  Once         04/23/25 2030     Place sequential compression device  Until discontinued         04/23/25 2030     Reason for No Pharmacological VTE Prophylaxis  Once        Question:  Reasons:  Answer:  Risk of Bleeding    04/23/25 2030                    Discharge Planning   HARISH: 4/26/2025     Code Status: Full Code   Medical Readiness for Discharge Date:   Discharge Plan A: Return to nursing home                        Kiel Reyes MD  Department of Hospital Medicine   [facility]

## 2025-04-25 NOTE — PLAN OF CARE
Recommendation:   1. Continue current diet as tolerated, texture/consistency modifications per SLP/MD.   2. Continue to provide Boost Plus TID, offer more often if pt accepting/willing to consume. Addition of Cam BID to promote wound healing.   3. Provide assistance with all meals and ONS.   4. Monitor weight/labs.   5. RD to follow and monitor nutrition status    Goals:   Pt intake >/= 50% EEN/EPN by RD follow up    Nutrition Goal Status: new  Communication of RD Recs: other (comment) (POC)

## 2025-04-25 NOTE — PLAN OF CARE
Problem: SLP  Goal: SLP Goal  Description: Goals:  1. Pt will participate in on-going assessment of swallowing function.    Outcome: Progressing    B/s swallow eval completed. Pt w/ increased WOB at rest and w/ intake, which has negatively impacted swallowing/breathing coordination w/ intake. ST recs cont conservative diet of Full liquids w/ thin liquids via tsp. Meds crushed in pudding or applesauce. ST to f/u x1 to ensure tolerance.     **PT IS LEGALLY BLIND AND REQUIRES 1:1 A FOR ALL INTAKE. PLEASE PROVIDE VERBAL CUES WHEN FEEDING OR GIVING MEDS.**

## 2025-04-26 NOTE — ASSESSMENT & PLAN NOTE
This 84 y.o. male presented with the following signs & symptoms of a UTI: abnormal smelling urine, foul smelling urine, and encephalopathy.  They do have signs/sxs that suggest infection extends beyond the bladder: malaise.    Objective UTI findings so far include: UA on admission.    The pt does have a recently-documented UTI confirmed with UCx: Proteus mirabilis at [facility] in January.  Suspect this patient has Complicated cystitis.  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   If pt becomes febrile then draw 2 blood culture tubes, each from different site and initiate Sepsis protocols

## 2025-04-26 NOTE — NURSING
[facility]  Nurses Note -- 4 Eyes      4/26/2025       Skin assessed on: Q Shift      [x] No Pressure Injuries Present    [x]Prevention Measures Documented    [] Yes LDA  for Pressure Injury Previously documented     [] Yes New Pressure Injury Discovered   [] LDA for New Pressure Injury Added      Attending RN:  Naila Chase LPN     Second RN:  Louisa DUVAL

## 2025-04-26 NOTE — PT/OT/SLP PROGRESS
Speech Language Pathology Treatment    Patient Name:  Magan Rose   MRN:  [id number]  Admitting Diagnosis: Complicated urinary tract infection    Recommendations:                 General Recommendations:  Dysphagia therapy  Diet recommendations:  NPO, Liquid Diet Level: NPO   Aspiration Precautions: Alternate means of nutrition/hydration and Frequent oral care   General Precautions: Standard, aspiration, NPO  Communication strategies:  provide increased time to answer; Pt is legally blind.    Assessment:     Magan Rose is a 84 y.o. male with a dx of Complicated urinary tract infection. Pt with decline in swallow overnight. Pt demonstrating oral holding of bolus with no active swallow response resulting in high aspiration risk. Pt with extremely wet, gurgly vocal quality unable to clear throat or produce productive cough at this time. ST recs: NPO. ST will con't to follow and provide ongoing swallow assessment and recommendations as appropriate and safe.    Subjective     Nurse Naila reported a decline in pt's swallow per night nurse. Pt awake upon SLP arrival with daughter present at bedside. Pt with extremely wet, gurgly vocal quality unable to effectively clear throat or cough to clear secretions.    Pain/Comfort:  Pain Rating 1: 0/10    Respiratory Status: Nasal cannula, flow 2 L/min    Objective:     Has the patient been evaluated by SLP for swallowing?   Yes  Keep patient NPO? Yes   Current Respiratory Status:        HOB elevated prior to administering PO trials. Pt with thick secretions in back of throat requiring oral care.  Pt accepted trials of ice chips x2 and water via spoon x1. Pt unable to demonstrate oral closure around spoon given max verbal cues. Pt held ice chip and water in oral cavity without lingual movement or swallow initiation. Pt was unable to effectively clear throat or demonstrate productive cough to clear pharyngeal secretions.SLP educated pt and daughter on recommendations for NPO at this  time 2/2 pt 's inability to swallow resulting in high  risk of aspiration.    Goals:   Multidisciplinary Problems       SLP Goals          Problem: SLP    Goal Priority Disciplines Outcome   SLP Goal    Low SLP Not Progressing   Description: Goals:  1. Pt will participate in on-going assessment of swallowing function.                         Plan:     Patient to be seen:  2 x/week   Plan of Care expires:     Plan of Care reviewed with:  patient, daughter   SLP Follow-Up:  Yes       Discharge recommendations:  No Therapy Indicated   Barriers to Discharge:  None    Time Tracking:     SLP Treatment Date:   04/26/25  Speech Start Time:  1126  Speech Stop Time:  1142     Speech Total Time (min):  16 min    Billable Minutes: Treatment Swallowing Dysfunction 16 min    04/26/2025

## 2025-04-26 NOTE — NURSING
IV access lost, multiple attempts at PIV access unsuccessful. Dr. Reyes notified, new orders received for midline IV.   House supervisor notified.

## 2025-04-26 NOTE — ASSESSMENT & PLAN NOTE
Patient's FSGs are controlled on current medication regimen.  Last A1c reviewed-   Lab Results   Component Value Date    HGBA1C 5.3 12/10/2020     Most recent fingerstick glucose reviewed-   Recent Labs   Lab 04/25/25  0724 04/25/25  1134 04/25/25  1600   POCTGLUCOSE 92 91 87     Current correctional scale  low  Maintain anti-hyperglycemic dose as follows-   Antihyperglycemics (From admission, onward)      Start     Stop Route Frequency Ordered    04/23/25 2237  insulin aspart U-100 pen 0-5 Units         -- SubQ Every 4 hours PRN 04/23/25 2137          Hold Oral hypoglycemics while patient is in the hospital.

## 2025-04-26 NOTE — PROGRESS NOTES
Pharmacokinetic Initial Assessment: IV Vancomycin    Assessment/Plan:    Initiate intravenous vancomycin with loading dose of 1750 mg once followed by a maintenance dose of vancomycin 1000mg IV every 12 hours  Desired empiric serum trough concentration is 10 to 20 mcg/mL  Draw vancomycin trough level 60 min prior to fourth dose on 4/27 at approximately 19:00  Pharmacy will continue to follow and monitor vancomycin.      Please contact pharmacy at extension [phone] with any questions regarding this assessment.     Thank you for the consult,   Elo Martinez       Patient brief summary:  Magan Rose is a 84 y.o. male initiated on antimicrobial therapy with IV Vancomycin for treatment of suspected bacteremia    Drug Allergies:   Review of patient's allergies indicates:  No Known Allergies    Actual Body Weight:   65.2 kg    Renal Function:   Estimated Creatinine Clearance: 83 mL/min (based on SCr of 0.6 mg/dL).,     Dialysis Method (if applicable):  N/A    CBC (last 72 hours):  Recent Labs   Lab Result Units 04/23/25 1653 04/24/25 0453 04/25/25  0808   WBC K/uL 12.70 11.63 9.17   HGB gm/dL 13.0* 11.5* 11.3*   HCT % 41.5 37.5* 36.5*   Platelet Count K/uL 346 305  --    Lymph % % 15.0* 11.7* 15.5*   Mono % % 14.3 11.8 12.3   Eos % % 0.1 0.3 0.8   Basophil % % 0.2 0.2 0.1       Metabolic Panel (last 72 hours):  Recent Labs   Lab Result Units 04/23/25 1653 04/23/25 2023 04/24/25  0453 04/25/25  1204   Sodium mmol/L 144  --  143 146*   Potassium mmol/L 4.0  --  4.8 3.6   Chloride mmol/L 106  --  109 108   CO2 mmol/L 27  --  22* 29   Glucose mg/dL 141*  --  112* 82   Glucose, UA   --  Negative  --   --    BUN mg/dL 36*  --  36* 32*   Creatinine mg/dL 0.8  --  0.7 0.6   Albumin g/dL 3.0*  --  2.8* 2.9*   Bilirubin Total mg/dL 0.5  --  0.4 0.3   ALP unit/L 112  --  96 112   AST unit/L 10*  --  15 12   ALT unit/L <5*  --  5* 6*   Magnesium  mg/dL  --   --  2.3 2.3   Phosphorus Level mg/dL  --   --  3.4 2.7       Drug levels  "(last 3 results):  No results for input(s): "VANCOMYCINRA", "VANCORANDOM", "VANCOMYCINPE", "VANCOPEAK", "VANCOMYCINTR", "VANCOTROUGH" in the last 72 hours.    Microbiologic Results:  Microbiology Results (last 7 days)       Procedure Component Value Units Date/Time    Urine culture [[id number]]  (Abnormal)  (Susceptibility) Collected: 04/23/25 2023    Order Status: Completed Specimen: Urine, Clean Catch Updated: 04/26/25 0625     Urine Culture >100,000 cfu/ml Escherichia coli ESBL      >100,000 cfu/ml Methicillin resistant Staphylococcus aureus    Narrative:      ESBL and MRSA called to Louisa Simmons 4/26/25 @0625    Blood culture x two cultures. Draw prior to antibiotics. [[id number]]  (Normal) Collected: 04/23/25 1653    Order Status: Completed Specimen: Blood from Peripheral, Forearm, Right Updated: 04/25/25 1801     Blood Culture No Growth After 48 Hours    Blood culture x two cultures. Draw prior to antibiotics. [[id number]]  (Normal) Collected: 04/23/25 1653    Order Status: Completed Specimen: Blood from Peripheral, Forearm, Left Updated: 04/25/25 1801     Blood Culture No Growth After 48 Hours    Clostridium difficile EIA [[id number]]     Order Status: Canceled Specimen: Stool             "

## 2025-04-26 NOTE — ASSESSMENT & PLAN NOTE
Anemia is likely due to chronic disease due to Chronic Kidney Disease. Most recent hemoglobin and hematocrit are listed below.  Recent Labs     04/23/25  1653 04/24/25  0453 04/25/25  0808   HGB 13.0* 11.5* 11.3*   HCT 41.5 37.5* 36.5*     Plan  - Monitor serial CBC: Daily  - Transfuse PRBC if patient becomes hemodynamically unstable, symptomatic or H/H drops below 7/21.  - Patient has not received any PRBC transfusions to date  - Patient's anemia is currently stable

## 2025-04-26 NOTE — PLAN OF CARE
Problem: Skin Injury Risk Increased  Goal: Skin Health and Integrity  Outcome: Progressing  Intervention: Optimize Skin Protection  Flowsheets (Taken 4/26/2025 1832)  Pressure Reduction Techniques: pressure points protected  Pressure Reduction Devices: heel offloading device utilized  Head of Bed (HOB) Positioning: HOB elevated     Problem: Adult Inpatient Plan of Care  Goal: Plan of Care Review  Outcome: Progressing  Flowsheets (Taken 4/26/2025 1832)  Plan of Care Reviewed With: patient     Problem: Infection  Goal: Absence of Infection Signs and Symptoms  Outcome: Progressing     Problem: Diabetes Comorbidity  Goal: Blood Glucose Level Within Targeted Range  Outcome: Progressing     Problem: Wound  Goal: Optimal Coping  Outcome: Progressing

## 2025-04-26 NOTE — PLAN OF CARE
Problem: Skin Injury Risk Increased  Goal: Skin Health and Integrity  Outcome: Progressing     Problem: Adult Inpatient Plan of Care  Goal: Plan of Care Review  Outcome: Progressing     Problem: Wound  Goal: Optimal Coping  Outcome: Progressing  Goal: Absence of Infection Signs and Symptoms  Outcome: Progressing  Goal: Optimal Pain Control and Function  Outcome: Progressing  Goal: Skin Health and Integrity  Outcome: Progressing  Goal: Optimal Wound Healing  Outcome: Progressing     Problem: Fall Injury Risk  Goal: Absence of Fall and Fall-Related Injury  Outcome: Progressing     Problem: Coping Ineffective  Goal: Effective Coping  Outcome: Progressing     Problem: Malnutrition  Goal: Improved Nutritional Intake  Outcome: Progressing

## 2025-04-26 NOTE — PLAN OF CARE
Problem: SLP  Goal: SLP Goal  Description: Goals:  1. Pt will participate in on-going assessment of swallowing function.    Outcome: Not Progressing   Nurse reported decline in pt's ability to swallow with wet, gurgly vocal quality. Pt re-assessed at bedside unable to actively swallow. Rec: NPO

## 2025-04-26 NOTE — NURSING
Pt lying in bed currently on 2L NC. Turned and repositioned with pillows. Changed dressing to sacrum. Suprapubic catheter present, dressing is dry, clean and intact; drainage bag to gravity with yellow colored urine. Noted bilateral heels have healed scabs, foam dressing applied with offloading heel boots. Bed alarm set, call light in reach, door opened to nurse's station.     [facility] [place]  Nurses Note -- 4 Eyes      4/25/2025       Skin assessed on: Q Shift      [] No Pressure Injuries Present    []Prevention Measures Documented    [x] Yes LDA  for Pressure Injury Previously documented     Sacrum, bilateral buttocks and heels    [] Yes New Pressure Injury Discovered   [] LDA for New Pressure Injury Added      Attending RN:  Louisa Simmons RN     Second RN:  DECLAN Yoo

## 2025-04-26 NOTE — PROGRESS NOTES
"[facility]  Progress Note    Patient Name: Magan Rose  MRN: [id number]  Patient Class: IP- Inpatient   Admission Date: 4/23/2025  Length of Stay: 3 days  Attending Physician: Kiel Reyes MD  Primary Care Provider: Andrae Sanchez MD        Subjective     Principal Problem:Complicated urinary tract infection        HPI:  Magan Rose is a 84 y.o. male with  Indwelling suprapubic catheter for at least 4 years, Non-Insulin-dependent DM2, HTN, HLD, AFib not on AC, and   Dementia who presented to [facility] ED for eval and treatment of chronic decubitus ulcer and decreased p.o. intake.  In emergency department, patient denies any complaints. Dtr reports noticing urine has been cloudy for several weeks, as well as a decrease in appetite over the past month, and has lost a substantial amount of weight and overall decline in his health over the last 6 months ("200 lbs" was recorded about 10 months ago; pt appears much less than that on presentation).  Dtr also says that he has a wet cough that he has not been able to always cough up.   He was hospitalized at [facility] over the New Year with a Proteus mirabilis UTI.    ED course: Lactate 2.67.  UA obtained but still pending on admission; delayed by lab processing.  BP soft in ED and required supplemental O2, but no fever.  Exam with underweight appearance, disoriented.  Labs WBC 12.7 Hgb 13.  BUN 36 Crt 0.8.  BNP Trop WNL.  EKG Sinus tachycardia w freq PACs.  CXR without large consolidation.  ED treatments: , CTX 1g.  Suprapubic catheter exchanged.  They were admitted to [facility] for further evaluation and treatment.      Overview/Hospital Course:  Admitted from with decreased oral intake, and FTT with weight loss. Found to have UTI, has had recent ESBL Ecoli, will start Ertapenem until blood cx and urine cx result. Palliative care consult. Wound care consult for sacral wound, as well as buttocks and heal. Appears to " Chief Complaint: lower back and left gluteal.   Date of Injury: 18  Initial Treatment Date: 2018   Date Last Seen: 2019  Mechanism of Onset: suddenly reaching and bending  Occupation: retired  Referred by:Robert Kerns DC  Primary Care Physician: Damon Lyn MD  Date informed consent signed:  2018   Jordan  reports that he has never smoked. He has quit using smokeless tobacco. His smokeless tobacco use included Chew.  Jordan is allergic to sulfa antibiotics.  Denies allergy to latex or sensitivity to latex.  Advance Directive Status:none   Visit Number of Current Episode: 8  Discharged from care: No  Initial pain ratin/10     Relevant Diagnostic Imaging/Testing:   None in chart   There were not vitals taken for this visit.  No changes in patient's medical or social history since their last visit.    SUBJECTIVE:  Jordan is a pleasant 64 year old male that presents to our office today for an evaluation and treatment of lower back and bilateral gluteus pain.  He rates his pain today at 1-2/10 with 10 being the worst. No changes with pain level since last visit. Pain is more generalized on the left side of his lower back today. Achy stiffness is intermittent throughout the day. Occasionally he has muscle spasms with lumbar flexion, lifting, and twisting. Walking and doing exercises at home helps to improve his symptom. He has been using Dc gel for pain relief at home.       have lost 68 lbs this year, daughter says it has been in the last month or two which is highly concerning for malignancy vs advanced dementia FTT. I spoke about code status with her and she would like to continue that conversation with palliative team today.      E.coli and GBS in urine. Previous E.coli was ESBL, ertapenem should cover both for now. Urine culture did come back with ESBL Ecoli, as well as MRSA. Vanc added, contact precautions.    Wt Readings from Last 4 Encounters:   04/23/25 60 kg (132 lb 4.4 oz)   06/23/24 90.7 kg (200 lb)   09/03/23 90.7 kg (200 lb)   07/10/23 90.7 kg (199 lb 15.3 oz)         Interval History:  NAEON.  No new issues.   CC- Gen Fatigue  All questions answered and updates on care given.       ROS:  Dementia     Vitals:    04/25/25 2313 04/25/25 2350 04/26/25 0243 04/26/25 0555   BP:  134/86  126/78   BP Location:       Patient Position:       Pulse: (!) 58 63 73 (!) 58   Resp:  19     Temp:  97.6 °F (36.4 °C)  97.7 °F (36.5 °C)   TempSrc:  Oral  Oral   SpO2:  100%  100%   Weight:       Height:              Body mass index is 20.05 kg/m².      PHYSICAL EXAM:  GENERAL APPEARANCE: alert and cooperative. Frail     HEAD: NC/AT  CARDIAC: There is no cyanosis or pallor.   LUNGS: No apparent wheezing or stridor.  ABDOMEN: Non-distended. No guarding.  MSK: No joint erythema or tenderness.           Recent Results (from the past 24 hours)   POCT glucose    Collection Time: 04/25/25  7:24 AM   Result Value Ref Range    POCT Glucose 92 70 - 110 mg/dL   CBC with Differential    Collection Time: 04/25/25  8:08 AM   Result Value Ref Range    WBC 9.17 3.90 - 12.70 K/uL    RBC 4.30 (L) 4.60 - 6.20 M/uL    HGB 11.3 (L) 14.0 - 18.0 gm/dL    HCT 36.5 (L) 40.0 - 54.0 %    MCV 85 82 - 98 fL    MCH 26.3 (L) 27.0 - 31.0 pg    MCHC 31.0 (L) 32.0 - 36.0 g/dL    RDW 16.0 (H) 11.5 - 14.5 %    Platelet Count      MPV 10.9 9.2 - 12.9 fL    Nucleated RBC 0 <=0 /100 WBC    Neut % 71.0 38 - 73 %    Lymph % 15.5 (L)  18 - 48 %    Mono % 12.3 4 - 15 %    Eos % 0.8 <=8 %    Basophil % 0.1 <=1.9 %    Imm Grans % 0.3 0.0 - 0.5 %    Neut # 6.51 1.8 - 7.7 K/uL    Lymph # 1.42 1 - 4.8 K/uL    Mono # 1.13 (H) 0.3 - 1 K/uL    Eos # 0.07 <=0.5 K/uL    Baso # 0.01 <=0.2 K/uL    Imm Grans # 0.03 0.00 - 0.04 K/uL   Platelet Review    Collection Time: 04/25/25  8:08 AM   Result Value Ref Range    Platelet Estimate Clumped (A)     POCT glucose    Collection Time: 04/25/25 11:34 AM   Result Value Ref Range    POCT Glucose 91 70 - 110 mg/dL   Comprehensive Metabolic Panel (CMP)    Collection Time: 04/25/25 12:04 PM   Result Value Ref Range    Sodium 146 (H) 136 - 145 mmol/L    Potassium 3.6 3.5 - 5.1 mmol/L    Chloride 108 95 - 110 mmol/L    CO2 29 23 - 29 mmol/L    Glucose 82 70 - 110 mg/dL    BUN 32 (H) 8 - 23 mg/dL    Creatinine 0.6 0.5 - 1.4 mg/dL    Calcium 9.8 8.7 - 10.5 mg/dL    Protein Total 8.9 (H) 6.0 - 8.4 gm/dL    Albumin 2.9 (L) 3.5 - 5.2 g/dL    Bilirubin Total 0.3 0.1 - 1.0 mg/dL     40 - 150 unit/L    AST 12 11 - 45 unit/L    ALT 6 (L) 10 - 44 unit/L    Anion Gap 9 8 - 16 mmol/L    eGFR >60 >60 mL/min/1.73/m2   Magnesium    Collection Time: 04/25/25 12:04 PM   Result Value Ref Range    Magnesium  2.3 1.6 - 2.6 mg/dL   Phosphorus    Collection Time: 04/25/25 12:04 PM   Result Value Ref Range    Phosphorus Level 2.7 2.7 - 4.5 mg/dL   Light Blue Top Hold    Collection Time: 04/25/25 12:18 PM   Result Value Ref Range    Extra Tube Hold for add-ons.    Red Top Hold    Collection Time: 04/25/25 12:18 PM   Result Value Ref Range    Extra Tube Hold for add-ons.    Lavender Top Hold    Collection Time: 04/25/25 12:18 PM   Result Value Ref Range    Extra Tube Hold for add-ons.    Gold Top Hold    Collection Time: 04/25/25 12:18 PM   Result Value Ref Range    Extra Tube Hold for add-ons.    POCT glucose    Collection Time: 04/25/25  4:00 PM   Result Value Ref Range    POCT Glucose 87 70 - 110 mg/dL       Microbiology Results (last 7  days)       Procedure Component Value Units Date/Time    Urine culture [[id number]]  (Abnormal)  (Susceptibility) Collected: 04/23/25 2023    Order Status: Completed Specimen: Urine, Clean Catch Updated: 04/26/25 0625     Urine Culture >100,000 cfu/ml Escherichia coli ESBL      >100,000 cfu/ml Methicillin resistant Staphylococcus aureus    Narrative:      ESBL and MRSA called to Louisa Cunninghamen 4/26/25 @0625    Blood culture x two cultures. Draw prior to antibiotics. [[id number]]  (Normal) Collected: 04/23/25 1653    Order Status: Completed Specimen: Blood from Peripheral, Forearm, Right Updated: 04/25/25 1801     Blood Culture No Growth After 48 Hours    Blood culture x two cultures. Draw prior to antibiotics. [[id number]]  (Normal) Collected: 04/23/25 1653    Order Status: Completed Specimen: Blood from Peripheral, Forearm, Left Updated: 04/25/25 1801     Blood Culture No Growth After 48 Hours    Clostridium difficile EIA [[id number]]     Order Status: Canceled Specimen: Stool              Imaging Results              X-Ray Chest AP Portable (Final result)  Result time 04/23/25 17:32:06      Final result by Johnny Thomas MD (04/23/25 17:32:06)                   Impression:      1. Chronic appearing interstitial findings, no large focal consolidation.      Electronically signed by: Johnny Thomas MD  Date:    04/23/2025  Time:    17:32               Narrative:    EXAMINATION:  XR CHEST AP PORTABLE    CLINICAL HISTORY:  Sepsis;    TECHNIQUE:  Single frontal view of the chest was performed.    COMPARISON:  06/24/2024    FINDINGS:  The cardiomediastinal silhouette is not enlarged noting tortuosity of the aorta..  There is no pleural effusion.  The trachea is midline.  The lungs are symmetrically expanded bilaterally with coarse interstitial attenuation bilaterally..  No large focal consolidation seen.  There is no pneumothorax.  The osseous structures are remarkable for degenerative change and osteopenia.  There is  gas and stool distention of the bowel..                                           Assessment & Plan  Complicated urinary tract infection  This 84 y.o. male presented with the following signs & symptoms of a UTI: abnormal smelling urine, foul smelling urine, and encephalopathy .  They do have signs/sxs that suggest infection extends beyond the bladder: malaise.    Objective UTI findings so far include:  UA on admission .    The pt does have a recently-documented UTI confirmed with UCx: Proteus mirabilis at [facility] in January.  Suspect this patient has Complicated cystitis.  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   If pt becomes febrile then draw 2 blood culture tubes, each from different site and initiate Sepsis protocols      Chronic indwelling Vargas catheter  Per UTI.    Type 2 diabetes mellitus, controlled  Patient's FSGs are controlled on current medication regimen.  Last A1c reviewed-   Lab Results   Component Value Date    HGBA1C 5.3 12/10/2020     Most recent fingerstick glucose reviewed-   Recent Labs   Lab 04/25/25  0724 04/25/25  1134 04/25/25  1600   POCTGLUCOSE 92 91 87     Current correctional scale   low  Maintain anti-hyperglycemic dose as follows-   Antihyperglycemics (From admission, onward)      Start     Stop Route Frequency Ordered    04/23/25 2237  insulin aspart U-100 pen 0-5 Units         -- SubQ Every 4 hours PRN 04/23/25 2137          Hold Oral hypoglycemics while patient is in the hospital.  Chronic heart failure with preserved ejection fraction  Well-compensated on exam.  Holding home Coreg and amlodipine in setting of sepsis concern, will consider resuming when stable but more likely defer to PCP given how frail he has become (so as not to increase chances of orthostasis).    Essential hypertension  Per HFpEF.  Non-occlusive coronary artery disease  Not on statin, ASA, or Plavix.  On Tele, WCTM.    Anemia of  chronic disease  Anemia is likely due to chronic disease due to Chronic Kidney Disease. Most recent hemoglobin and hematocrit are listed below.  Recent Labs     04/23/25  1653 04/24/25  0453 04/25/25  0808   HGB 13.0* 11.5* 11.3*   HCT 41.5 37.5* 36.5*     Plan  - Monitor serial CBC: Daily  - Transfuse PRBC if patient becomes hemodynamically unstable, symptomatic or H/H drops below 7/21.  - Patient has not received any PRBC transfusions to date  - Patient's anemia is currently stable      Legally blind  Will use frequent verbal assurances and cues, and assist with feeding      Nursing home resident  CM/SW consult to assist with his return to [facility] when ready.      ACP (advance care planning)      Severe protein-calorie malnutrition  Nutrition consulted. Most recent weight and BMI monitored-     Measurements:  Wt Readings from Last 1 Encounters:   04/25/25 65.2 kg (143 lb 11.8 oz)   Body mass index is 20.05 kg/m².    Patient has been screened and assessed by RD.    Malnutrition Type:  Context: chronic illness  Level: severe    Malnutrition Characteristic Summary:  Weight Loss (Malnutrition): greater than 20% in 1 year  Energy Intake (Malnutrition): less than 75% for greater than or equal to 3 months    Interventions/Recommendations (treatment strategy):  1. Continue current diet as tolerated, texture/consistency modifications per SLP/MD. 2. Continue to provide Boost Plus TID, offer more often if pt accepting/willing to consume.  Addition of Cam BID to promote wound healing.  3. Provide assistance with all meals and ONS. 4. Monitor weight/labs. 5. RD to follow and monitor nutrition status      VTE Risk Mitigation (From admission, onward)           Ordered     IP VTE HIGH RISK PATIENT  Once         04/23/25 2030     Place sequential compression device  Until discontinued         04/23/25 2030     Reason for No Pharmacological VTE Prophylaxis  Once        Question:  Reasons:  Answer:  Risk of Bleeding    04/23/25  2030                    Discharge Planning   HARISH: 4/30/2025     Code Status: DNR   Medical Readiness for Discharge Date:   Discharge Plan A: Return to nursing home                        Kiel Reyes MD  Department of Hospital Medicine   [facility]

## 2025-04-27 PROBLEM — J96.01 ACUTE RESPIRATORY FAILURE WITH HYPOXIA AND HYPERCARBIA: Status: ACTIVE | Noted: 2025-01-01

## 2025-04-27 PROBLEM — J96.02 ACUTE RESPIRATORY FAILURE WITH HYPOXIA AND HYPERCARBIA: Status: ACTIVE | Noted: 2025-01-01

## 2025-04-27 NOTE — PLAN OF CARE
Problem: Adult Inpatient Plan of Care  Goal: Optimal Comfort and Wellbeing  Outcome: Progressing     Problem: Skin Injury Risk Increased  Goal: Skin Health and Integrity  Outcome: Progressing     Problem: Adult Inpatient Plan of Care  Goal: Plan of Care Review  Outcome: Progressing     Problem: Adult Inpatient Plan of Care  Goal: Absence of Hospital-Acquired Illness or Injury  Outcome: Progressing

## 2025-04-27 NOTE — PT/OT/SLP PROGRESS
Speech Language Pathology      Magan Rose  MRN: [id number]    Patient not seen today secondary to bipap. Will re-attempt tomorrow. Pauline Florian, NAHOMY-SLP

## 2025-04-27 NOTE — ASSESSMENT & PLAN NOTE
Patient's FSGs are controlled on current medication regimen.  Last A1c reviewed-   Lab Results   Component Value Date    HGBA1C 5.3 12/10/2020     Most recent fingerstick glucose reviewed-   Recent Labs   Lab 04/26/25  0750 04/26/25  1516 04/27/25  0009   POCTGLUCOSE 79 76 135*     Current correctional scale  low  Maintain anti-hyperglycemic dose as follows-   Antihyperglycemics (From admission, onward)      Start     Stop Route Frequency Ordered    04/23/25 2237  insulin aspart U-100 pen 0-5 Units         -- SubQ Every 4 hours PRN 04/23/25 2137          Hold Oral hypoglycemics while patient is in the hospital.

## 2025-04-27 NOTE — ASSESSMENT & PLAN NOTE
Anemia is likely due to chronic disease due to Chronic Kidney Disease. Most recent hemoglobin and hematocrit are listed below.  Recent Labs     04/25/25  0808 04/27/25  0520   HGB 11.3* 11.1*   HCT 36.5* 35.9*     Plan  - Monitor serial CBC: Daily  - Transfuse PRBC if patient becomes hemodynamically unstable, symptomatic or H/H drops below 7/21.  - Patient has not received any PRBC transfusions to date  - Patient's anemia is currently stable

## 2025-04-27 NOTE — SUBJECTIVE & OBJECTIVE
Past Medical History:   Diagnosis Date    Anemia     Atrial flutter     Bacterial meningitis     BPH (benign prostatic hyperplasia)     CAD (coronary artery disease)     Cataract     CHF (congestive heart failure)     Dementia     Diabetes mellitus     Vargas catheter in place     GERD (gastroesophageal reflux disease)     Glaucoma     History of psychiatric hospitalization     sent to us from [facility]    Hyperlipidemia     Hypertension     Metabolic encephalopathy     Nursing home resident     Renal disorder     Schizoaffective disorder     Urinary retention     UTI (urinary tract infection)        Past Surgical History:   Procedure Laterality Date    CARDIAC CATHETERIZATION  2017    VESICOSTOMY N/A 2022    Procedure: CREATION, CYSTOSTOMY, cystoscopy, bladder stone removal possible laser lithotripsy;  Surgeon: Ruthy Guerra MD;  Location: [facility];  Service: Urology;  Laterality: N/A;  holmium laser needed  RN PHONE PREOP DONE WITH [facility] HOME NURSE,   VACCINATED       Review of patient's allergies indicates:  No Known Allergies    Family History       Problem Relation (Age of Onset)    Diabetes Mother, Father, Sister, Brother    Heart disease Mother, Father, Sister, Brother    Hypertension Mother, Father          Tobacco Use    Smoking status: Former     Current packs/day: 0.00     Types: Cigarettes     Quit date: 2003     Years since quittin.7    Smokeless tobacco: Never   Substance and Sexual Activity    Alcohol use: No    Drug use: No    Sexual activity: Not Currently         Review of Systems   Unable to perform ROS: Acuity of condition     Objective:     Vital Signs (Most Recent):  Temp: 96.7 °F (35.9 °C) (25 1132)  Pulse: 79 (25 1132)  Resp: 20 (25 1132)  BP: (!) 147/81 (25 1132)  SpO2: (!) 90 % (25 1132) Vital Signs (24h Range):  Temp:  [96.7 °F (35.9 °C)-97.7 °F (36.5 °C)] 96.7 °F (35.9 °C)  Pulse:  [55-86] 79  Resp:  [14-20] 20  SpO2:  [72 %-100  "%] 90 %  BP: (131-182)/(76-89) 147/81     Weight: 65.2 kg (143 lb 11.8 oz)  Body mass index is 20.05 kg/m².      Intake/Output Summary (Last 24 hours) at 4/27/2025 1242  Last data filed at 4/27/2025 0628  Gross per 24 hour   Intake --   Output 1300 ml   Net -1300 ml        Physical Exam  Constitutional:       Appearance: He is ill-appearing and toxic-appearing.   HENT:      Head: Normocephalic.      Mouth/Throat:      Comments: BiPAP mask in place.   Cardiovascular:      Rate and Rhythm: Normal rate and regular rhythm.   Pulmonary:      Effort: Pulmonary effort is normal.      Breath sounds: Wheezing and rhonchi present. No rales.   Psychiatric:         Mood and Affect: Mood normal.         Behavior: Behavior normal.         Thought Content: Thought content normal.         Judgment: Judgment normal.          Vents:  Oxygen Concentration (%): 60 (04/27/25 1054)    Lines/Drains/Airways       Drain  Duration                  Suprapubic Catheter 04/23/25 1700 3 days              Peripheral Intravenous Line  Duration                  Midline Catheter - Single Lumen 04/26/25 1140 Left basilic vein (medial side of arm) other (see comments) 1 day                  Significant Labs:    CBC/Anemia Profile:  Recent Labs   Lab 04/27/25  0520   WBC 7.75   HGB 11.1*   HCT 35.9*      MCV 85   RDW 15.9*     Chemistries:  Recent Labs   Lab 04/27/25  0520   *   K 2.6*      CO2 31*   BUN 19   CREATININE 0.5   CALCIUM 9.0   GLUCOSE 125*       ABGs:   Recent Labs   Lab 04/27/25  0827   PH 7.229*   PCO2 82.8*   HCO3 34.6*   POCSATURATED 100   BE 5*     CMP:   Recent Labs   Lab 04/27/25  0520   *   K 2.6*      CO2 31*   BUN 19   CREATININE 0.5   CALCIUM 9.0   ANIONGAP 9     Coagulation: No results for input(s): "PT", "INR", "APTT" in the last 48 hours.  Respiratory Culture: No results for input(s): "GSRESP", "RESPIRATORYC" in the last 48 hours.  All pertinent labs within the past 24 hours have been " reviewed.    Significant Imaging:   I have reviewed all pertinent imaging results/findings within the past 24 hours.  CT: I have reviewed all pertinent results/findings within the past 24 hours and my personal findings are:  mucous or aspiration debris noted in right and left main, segmental and subsegmental airways.

## 2025-04-27 NOTE — NURSING
OM-  Rapid Response Nurse Intervention/ Task    Date of Visit: 04/27/2025  Time of Visit:  0740  INTERVENTIONS/ TASK Completed:     Message sent to MD sat 69% on 2l. I also notified RT.RT at bedside immediately..

## 2025-04-27 NOTE — NURSING
[facility]  Nurses Note -- 4 Eyes      4/26/2025       Skin assessed on: Q Shift      [] No Pressure Injuries Present    [x]Prevention Measures Documented    [x] Yes LDA  for Pressure Injury Previously documented     [] Yes New Pressure Injury Discovered   [] LDA for New Pressure Injury Added      Attending RN:  Surjit Duff RN     Second RN:  DECLAN Yoo        none

## 2025-04-27 NOTE — ASSESSMENT & PLAN NOTE
Patient with Hypercapnic and Hypoxic Respiratory failure which is Acute.  he is not on home oxygen. Supplemental oxygen was provided and noted- Oxygen Concentration (%):  [] 60    .   Signs/symptoms of respiratory failure include- tachypnea, increased work of breathing, respiratory distress, use of accessory muscles, and lethargy. Contributing diagnoses includes - Aspiration Labs and images were reviewed. Patient Has recent ABG, which has been reviewed. Will treat underlying causes and adjust management of respiratory failure as follows-   - continue BiPAP.  - start hypertonic saline and CPT. Discussed that with patient with weak cough and on BiPAP will be difficult to achieve airway clearance.   - Discussed with primary who will place orders for PRN anxiety/dyspnea in the event that patient continues to worsen or have another event.

## 2025-04-27 NOTE — CONSULTS
[facility] [place]  [place]ulmonology  Consult Note    Patient Name: Magan Rose  MRN: [id number]  Admission Date: 4/23/2025  Hospital Length of Stay: 4 days  Code Status: DNR  Attending Physician: Kiel Reyes MD  Primary Care Provider: Andrae Sanchez MD   Principal Problem: Complicated urinary tract infection    Inpatient consult to Pulmonology  Consult performed by: Alberto Holland MD  Consult ordered by: Keil Reyes MD        Subjective:     HPI:  Indwelling suprapubic catheter for at least 4 years, Non-Insulin-dependent DM2, HTN, HLD, AFib not on AC, and Dementia who presented to hospital for evaluation of acute on chronic decubitus ulcer and worsening PO intake. Pulmonary consulted today due to acute worsening of hypoxemic respiratory failure and respiratory distress. CTA obtained due to possible PE showing large quantity of mucous or aspiration debris in right and left lower lung airways. Pt currently on BiPAP requiring 60% to maintain SaO2 of 90%.     Past Medical History:   Diagnosis Date    Anemia     Atrial flutter     Bacterial meningitis     BPH (benign prostatic hyperplasia)     CAD (coronary artery disease)     Cataract     CHF (congestive heart failure)     Dementia     Diabetes mellitus     Vargas catheter in place     GERD (gastroesophageal reflux disease)     Glaucoma     History of psychiatric hospitalization     sent to us from [facility]    Hyperlipidemia     Hypertension     Metabolic encephalopathy     Nursing home resident     Renal disorder     Schizoaffective disorder     Urinary retention     UTI (urinary tract infection)        Past Surgical History:   Procedure Laterality Date    CARDIAC CATHETERIZATION  09/07/2017    VESICOSTOMY N/A 9/27/2022    Procedure: CREATION, CYSTOSTOMY, cystoscopy, bladder stone removal possible laser lithotripsy;  Surgeon: Ruthy Guerra MD;  Location: [facility];  Service: Urology;  Laterality: N/A;  holmium laser needed  RN PHONE PREOP DONE WITH NSG  HOME NURSE,   VACCINATED       Review of patient's allergies indicates:  No Known Allergies    Family History       Problem Relation (Age of Onset)    Diabetes Mother, Father, Sister, Brother    Heart disease Mother, Father, Sister, Brother    Hypertension Mother, Father          Tobacco Use    Smoking status: Former     Current packs/day: 0.00     Types: Cigarettes     Quit date: 2003     Years since quittin.7    Smokeless tobacco: Never   Substance and Sexual Activity    Alcohol use: No    Drug use: No    Sexual activity: Not Currently         Review of Systems   Unable to perform ROS: Acuity of condition     Objective:     Vital Signs (Most Recent):  Temp: 96.7 °F (35.9 °C) (25 1132)  Pulse: 79 (25 1132)  Resp: 20 (25 1132)  BP: (!) 147/81 (25 1132)  SpO2: (!) 90 % (25 1132) Vital Signs (24h Range):  Temp:  [96.7 °F (35.9 °C)-97.7 °F (36.5 °C)] 96.7 °F (35.9 °C)  Pulse:  [55-86] 79  Resp:  [14-20] 20  SpO2:  [72 %-100 %] 90 %  BP: (131-182)/(76-89) 147/81     Weight: 65.2 kg (143 lb 11.8 oz)  Body mass index is 20.05 kg/m².      Intake/Output Summary (Last 24 hours) at 2025 1242  Last data filed at 2025 0628  Gross per 24 hour   Intake --   Output 1300 ml   Net -1300 ml        Physical Exam  Constitutional:       Appearance: He is ill-appearing and toxic-appearing.   HENT:      Head: Normocephalic.      Mouth/Throat:      Comments: BiPAP mask in place.   Cardiovascular:      Rate and Rhythm: Normal rate and regular rhythm.   Pulmonary:      Effort: Pulmonary effort is normal.      Breath sounds: Wheezing and rhonchi present. No rales.   Psychiatric:         Mood and Affect: Mood normal.         Behavior: Behavior normal.         Thought Content: Thought content normal.         Judgment: Judgment normal.          Vents:  Oxygen Concentration (%): 60 (25 1054)    Lines/Drains/Airways       Drain  Duration                  Suprapubic Catheter 25 1700 3  "days              Peripheral Intravenous Line  Duration                  Midline Catheter - Single Lumen 04/26/25 1140 Left basilic vein (medial side of arm) other (see comments) 1 day                  Significant Labs:    CBC/Anemia Profile:  Recent Labs   Lab 04/27/25  0520   WBC 7.75   HGB 11.1*   HCT 35.9*      MCV 85   RDW 15.9*     Chemistries:  Recent Labs   Lab 04/27/25  0520   *   K 2.6*      CO2 31*   BUN 19   CREATININE 0.5   CALCIUM 9.0   GLUCOSE 125*       ABGs:   Recent Labs   Lab 04/27/25  0827   PH 7.229*   PCO2 82.8*   HCO3 34.6*   POCSATURATED 100   BE 5*     CMP:   Recent Labs   Lab 04/27/25  0520   *   K 2.6*      CO2 31*   BUN 19   CREATININE 0.5   CALCIUM 9.0   ANIONGAP 9     Coagulation: No results for input(s): "PT", "INR", "APTT" in the last 48 hours.  Respiratory Culture: No results for input(s): "GSRESP", "RESPIRATORYC" in the last 48 hours.  All pertinent labs within the past 24 hours have been reviewed.    Significant Imaging:   I have reviewed all pertinent imaging results/findings within the past 24 hours.  CT: I have reviewed all pertinent results/findings within the past 24 hours and my personal findings are:  mucous or aspiration debris noted in right and left main, segmental and subsegmental airways.     ABG  Recent Labs   Lab 04/27/25  0827   PH 7.229*   PO2 251*   PCO2 82.8*   HCO3 34.6*   BE 5*     Assessment/Plan:     Pulmonary  Acute respiratory failure with hypoxia and hypercarbia  Patient with Hypercapnic and Hypoxic Respiratory failure which is Acute.  he is not on home oxygen. Supplemental oxygen was provided and noted- Oxygen Concentration (%):  [] 60    .   Signs/symptoms of respiratory failure include- tachypnea, increased work of breathing, respiratory distress, use of accessory muscles, and lethargy. Contributing diagnoses includes - Aspiration Labs and images were reviewed. Patient Has recent ABG, which has been reviewed. Will " treat underlying causes and adjust management of respiratory failure as follows-   - continue BiPAP.  - start hypertonic saline and CPT. Discussed that with patient with weak cough and on BiPAP will be difficult to achieve airway clearance.   - Discussed with primary who will place orders for PRN anxiety/dyspnea in the event that patient continues to worsen or have another event.     Palliative Care  ACP (advance care planning)  15 minute discussion with patient son and daughter about current situation. They had been planning to have patient be sent to NH for hospice, but with change in clinical status would like to transition to hospice in the next couple of days after working out some logistics. Explained process of transition to comfort and purpose of medications with comfort oriented care.           Thank you for your consult. I will sign off. Please contact us if you have any additional questions.     Alberto Holland MD  Pulmonology  [facility] - Telemetry

## 2025-04-27 NOTE — PROGRESS NOTES
"[facility] Medicine  Progress Note    Patient Name: Magan Rose  MRN: [id number]  Patient Class: IP- Inpatient   Admission Date: 4/23/2025  Length of Stay: 4 days  Attending Physician: Kiel Reyes MD  Primary Care Provider: Andrae Sanchez MD        Subjective     Principal Problem:Complicated urinary tract infection        HPI:  Magan Rose is a 84 y.o. male with  Indwelling suprapubic catheter for at least 4 years, Non-Insulin-dependent DM2, HTN, HLD, AFib not on AC, and   Dementia who presented to [facility] ED for eval and treatment of chronic decubitus ulcer and decreased p.o. intake.  In emergency department, patient denies any complaints. Dtr reports noticing urine has been cloudy for several weeks, as well as a decrease in appetite over the past month, and has lost a substantial amount of weight and overall decline in his health over the last 6 months ("200 lbs" was recorded about 10 months ago; pt appears much less than that on presentation).  Dtr also says that he has a wet cough that he has not been able to always cough up.   He was hospitalized at [facility] [place] over the New Year with a Proteus mirabilis UTI.    ED course: Lactate 2.67.  UA obtained but still pending on admission; delayed by lab processing.  BP soft in ED and required supplemental O2, but no fever.  Exam with underweight appearance, disoriented.  Labs WBC 12.7 Hgb 13.  BUN 36 Crt 0.8.  BNP Trop WNL.  EKG Sinus tachycardia w freq PACs.  CXR without large consolidation.  ED treatments: , CTX 1g.  Suprapubic catheter exchanged.  They were admitted to [facility] Medicine for further evaluation and treatment.      Overview/Hospital Course:  Admitted from with decreased oral intake, and FTT with weight loss. Found to have UTI, has had recent ESBL Ecoli, will start Ertapenem until blood cx and urine cx result. Palliative care consult. Wound care consult for sacral wound, as well as buttocks and heal. Appears to " have lost 68 lbs this year, daughter says it has been in the last month or two which is highly concerning for malignancy vs advanced dementia FTT. I spoke about code status with her and she would like to continue that conversation with palliative team today.      E.coli and GBS in urine. Previous E.coli was ESBL, ertapenem should cover both for now. Urine culture did come back with ESBL Ecoli, as well as MRSA. Vanc added, contact precautions.    Wt Readings from Last 4 Encounters:   04/23/25 60 kg (132 lb 4.4 oz)   06/23/24 90.7 kg (200 lb)   09/03/23 90.7 kg (200 lb)   07/10/23 90.7 kg (199 lb 15.3 oz)         Interval History:  NAEON.  No new issues. Some blood in hernandez however hb stable  CC- Gen Fatigue  All questions answered and updates on care given.       ROS:  Dementia     Vitals:    04/26/25 2306 04/27/25 0000 04/27/25 0302 04/27/25 0524   BP:  (!) 157/80  (!) 157/78   BP Location:  Right arm  Right arm   Patient Position:  Lying  Lying   Pulse: (!) 56 (!) 58 (!) 55 60   Resp:  16  16   Temp:  97.6 °F (36.4 °C)  97.5 °F (36.4 °C)   TempSrc:  Oral  Oral   SpO2:  100%  98%   Weight:       Height:              Body mass index is 20.05 kg/m².      PHYSICAL EXAM:  GENERAL APPEARANCE: alert and cooperative. Frail     HEAD: NC/AT  CARDIAC: There is no cyanosis or pallor.   LUNGS: No apparent wheezing or stridor.  ABDOMEN: Non-distended. No guarding.  MSK: No joint erythema or tenderness.           Recent Results (from the past 24 hours)   POCT glucose    Collection Time: 04/26/25  7:50 AM   Result Value Ref Range    POCT Glucose 79 70 - 110 mg/dL   POCT glucose    Collection Time: 04/26/25  3:16 PM   Result Value Ref Range    POCT Glucose 76 70 - 110 mg/dL   POCT glucose    Collection Time: 04/27/25 12:09 AM   Result Value Ref Range    POCT Glucose 135 (H) 70 - 110 mg/dL   CBC with Differential    Collection Time: 04/27/25  5:20 AM   Result Value Ref Range    WBC 7.75 3.90 - 12.70 K/uL    RBC 4.24 (L) 4.60 - 6.20  M/uL    HGB 11.1 (L) 14.0 - 18.0 gm/dL    HCT 35.9 (L) 40.0 - 54.0 %    MCV 85 82 - 98 fL    MCH 26.2 (L) 27.0 - 31.0 pg    MCHC 30.9 (L) 32.0 - 36.0 g/dL    RDW 15.9 (H) 11.5 - 14.5 %    Platelet Count 272 150 - 450 K/uL    MPV 10.8 9.2 - 12.9 fL    Nucleated RBC 0 <=0 /100 WBC    Neut % 71.6 38 - 73 %    Lymph % 14.8 (L) 18 - 48 %    Mono % 12.5 4 - 15 %    Eos % 0.6 <=8 %    Basophil % 0.1 <=1.9 %    Imm Grans % 0.4 0.0 - 0.5 %    Neut # 5.54 1.8 - 7.7 K/uL    Lymph # 1.15 1 - 4.8 K/uL    Mono # 0.97 0.3 - 1 K/uL    Eos # 0.05 <=0.5 K/uL    Baso # 0.01 <=0.2 K/uL    Imm Grans # 0.03 0.00 - 0.04 K/uL       Microbiology Results (last 7 days)       Procedure Component Value Units Date/Time    Blood culture x two cultures. Draw prior to antibiotics. [[id number]]  (Normal) Collected: 04/23/25 1653    Order Status: Completed Specimen: Blood from Peripheral, Forearm, Right Updated: 04/26/25 1802     Blood Culture No Growth After 72 Hours    Blood culture x two cultures. Draw prior to antibiotics. [[id number]]  (Normal) Collected: 04/23/25 1653    Order Status: Completed Specimen: Blood from Peripheral, Forearm, Left Updated: 04/26/25 1802     Blood Culture No Growth After 72 Hours    Urine culture [[id number]]  (Abnormal)  (Susceptibility) Collected: 04/23/25 2023    Order Status: Completed Specimen: Urine, Clean Catch Updated: 04/26/25 0625     Urine Culture >100,000 cfu/ml Escherichia coli ESBL      >100,000 cfu/ml Methicillin resistant Staphylococcus aureus    Narrative:      ESBL and MRSA called to Louisa Simmons 4/26/25 @0625    Clostridium difficile EIA [[id number]]     Order Status: Canceled Specimen: Stool              Imaging Results              X-Ray Chest AP Portable (Final result)  Result time 04/23/25 17:32:06      Final result by Johnny Thomas MD (04/23/25 17:32:06)                   Impression:      1. Chronic appearing interstitial findings, no large focal consolidation.      Electronically signed  by: Johnny Thomas MD  Date:    04/23/2025  Time:    17:32               Narrative:    EXAMINATION:  XR CHEST AP PORTABLE    CLINICAL HISTORY:  Sepsis;    TECHNIQUE:  Single frontal view of the chest was performed.    COMPARISON:  06/24/2024    FINDINGS:  The cardiomediastinal silhouette is not enlarged noting tortuosity of the aorta..  There is no pleural effusion.  The trachea is midline.  The lungs are symmetrically expanded bilaterally with coarse interstitial attenuation bilaterally..  No large focal consolidation seen.  There is no pneumothorax.  The osseous structures are remarkable for degenerative change and osteopenia.  There is gas and stool distention of the bowel..                                           Assessment & Plan  Complicated urinary tract infection  This 84 y.o. male presented with the following signs & symptoms of a UTI: abnormal smelling urine, foul smelling urine, and encephalopathy .  They do have signs/sxs that suggest infection extends beyond the bladder: malaise.    Objective UTI findings so far include:  UA on admission .    The pt does have a recently-documented UTI confirmed with UCx: Proteus mirabilis at [facility] [place] in January.  Suspect this patient has Complicated cystitis.  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   If pt becomes febrile then draw 2 blood culture tubes, each from different site and initiate Sepsis protocols      Chronic indwelling Vargas catheter  Per UTI.    Type 2 diabetes mellitus, controlled  Patient's FSGs are controlled on current medication regimen.  Last A1c reviewed-   Lab Results   Component Value Date    HGBA1C 5.3 12/10/2020     Most recent fingerstick glucose reviewed-   Recent Labs   Lab 04/26/25  0750 04/26/25  1516 04/27/25  0009   POCTGLUCOSE 79 76 135*     Current correctional scale   low  Maintain anti-hyperglycemic dose as follows-   Antihyperglycemics (From  admission, onward)      Start     Stop Route Frequency Ordered    04/23/25 2237  insulin aspart U-100 pen 0-5 Units         -- SubQ Every 4 hours PRN 04/23/25 2137          Hold Oral hypoglycemics while patient is in the hospital.  Chronic heart failure with preserved ejection fraction  Well-compensated on exam.  Holding home Coreg and amlodipine in setting of sepsis concern, will consider resuming when stable but more likely defer to PCP given how frail he has become (so as not to increase chances of orthostasis).    Essential hypertension  Per HFpEF.  Non-occlusive coronary artery disease  Not on statin, ASA, or Plavix.  On Tele, WCTM.    Anemia of chronic disease  Anemia is likely due to chronic disease due to Chronic Kidney Disease. Most recent hemoglobin and hematocrit are listed below.  Recent Labs     04/25/25  0808 04/27/25  0520   HGB 11.3* 11.1*   HCT 36.5* 35.9*     Plan  - Monitor serial CBC: Daily  - Transfuse PRBC if patient becomes hemodynamically unstable, symptomatic or H/H drops below 7/21.  - Patient has not received any PRBC transfusions to date  - Patient's anemia is currently stable      Legally blind  Will use frequent verbal assurances and cues, and assist with feeding      Nursing home resident  CM/SW consult to assist with his return to [facility] when ready.      ACP (advance care planning)      Severe protein-calorie malnutrition  Nutrition consulted. Most recent weight and BMI monitored-     Measurements:  Wt Readings from Last 1 Encounters:   04/25/25 65.2 kg (143 lb 11.8 oz)   Body mass index is 20.05 kg/m².    Patient has been screened and assessed by RD.    Malnutrition Type:  Context: chronic illness  Level: severe    Malnutrition Characteristic Summary:  Weight Loss (Malnutrition): greater than 20% in 1 year  Energy Intake (Malnutrition): less than 75% for greater than or equal to 3 months    Interventions/Recommendations (treatment strategy):  1. Continue current diet as  tolerated, texture/consistency modifications per SLP/MD. 2. Continue to provide Boost Plus TID, offer more often if pt accepting/willing to consume.  Addition of Cam BID to promote wound healing.  3. Provide assistance with all meals and ONS. 4. Monitor weight/labs. 5. RD to follow and monitor nutrition status      VTE Risk Mitigation (From admission, onward)           Ordered     IP VTE HIGH RISK PATIENT  Once         04/23/25 2030     Place sequential compression device  Until discontinued         04/23/25 2030     Reason for No Pharmacological VTE Prophylaxis  Once        Question:  Reasons:  Answer:  Risk of Bleeding    04/23/25 2030                    Discharge Planning   HARISH: 4/29/2025     Code Status: DNR   Medical Readiness for Discharge Date:   Discharge Plan A: Return to nursing home                        Kiel Reyes MD  Department of Hospital Medicine   [facility]

## 2025-04-27 NOTE — ASSESSMENT & PLAN NOTE
This 84 y.o. male presented with the following signs & symptoms of a UTI: abnormal smelling urine, foul smelling urine, and encephalopathy.  They do have signs/sxs that suggest infection extends beyond the bladder: malaise.    Objective UTI findings so far include: UA on admission.    The pt does have a recently-documented UTI confirmed with UCx: Proteus mirabilis at [facility].  Suspect this patient has Complicated cystitis.  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   If pt becomes febrile then draw 2 blood culture tubes, each from different site and initiate Sepsis protocols

## 2025-04-27 NOTE — PLAN OF CARE
Problem: Skin Injury Risk Increased  Goal: Skin Health and Integrity  Outcome: Progressing     Problem: Adult Inpatient Plan of Care  Goal: Plan of Care Review  Outcome: Progressing  Flowsheets (Taken 4/27/2025 1741)  Plan of Care Reviewed With: patient     Problem: Diabetes Comorbidity  Goal: Blood Glucose Level Within Targeted Range  Outcome: Progressing  Intervention: Monitor and Manage Glycemia  Flowsheets (Taken 4/27/2025 1740)  Glycemic Management: blood glucose monitored     Problem: Wound  Goal: Optimal Coping  Outcome: Progressing     Problem: Fall Injury Risk  Goal: Absence of Fall and Fall-Related Injury  Outcome: Progressing     Problem: Malnutrition  Goal: Improved Nutritional Intake  Outcome: Progressing

## 2025-04-27 NOTE — HPI
Indwelling suprapubic catheter for at least 4 years, Non-Insulin-dependent DM2, HTN, HLD, AFib not on AC, and Dementia who presented to hospital for evaluation of acute on chronic decubitus ulcer and worsening PO intake. Pulmonary consulted today due to acute worsening of hypoxemic respiratory failure and respiratory distress. CTA obtained due to possible PE showing large quantity of mucous or aspiration debris in right and left lower lung airways. Pt currently on BiPAP requiring 60% to maintain SaO2 of 90%.

## 2025-04-27 NOTE — NURSING
Patient rounded on by ICU nurse, Patient looked to have increased work of Breathing. 02 Sats found to be 69% on 2L nc.    Nurse Recommended NRB mask and notified Resp therapist    MD notified

## 2025-04-27 NOTE — ASSESSMENT & PLAN NOTE
15 minute discussion with patient son and daughter about current situation. They had been planning to have patient be sent to NH for hospice, but with change in clinical status would like to transition to hospice in the next couple of days after working out some logistics. Explained process of transition to comfort and purpose of medications with comfort oriented care.

## 2025-04-27 NOTE — SIGNIFICANT EVENT
Patient had sudden deterioration, placed on NRB and sats still 74%  Perhaps flash pulmonary edema vs PE vs PTX vs Aspiration  Aspiration is high on ddx given state  Will switch to Bipap  Called daughter and updated, she will come up here  Air hunger, will give small dose IV morphine 2 mg  Stat CXR ordered  LA and ABG ordered  Pulm consult    Does have some blood in urine but Hb was stable   Was on small amount of fluids/d5 as his Na was coming up    Addendum:   Spoke to dtr, now bedside, her brother is coming from [place].   Given pt not on dvt ppx (gross hematuria), dtr would like to proceed with CTA to see if he has a clot, then her and brother will make decision about full comfort measures/hospice.     AHHRF  pH low and PCO2 high, will leave on bipap for a couple hours              Kiel Reyes MD        Medical Director        Section Head of [facility] Medicine        Board-Certified Internal Medicine Attending      Critical Care Time: 35 min, [facility]    Critical care was time spent personally by me on the following activities: evaluating this patient's organ dysfunction, development of treatment plan, discussing treatment plan with patient or surrogate and bedside caregivers, discussions with consultants, evaluation of patient's response to treatment, examination of patient, ordering and performing treatments and interventions, ordering and review of laboratory studies, ordering and review of radiographic studies, re-evaluation of patient's condition. This critical care time did not overlap with that of any other provider or involve time for any separately billed procedures

## 2025-04-28 LAB
BACTERIA BLD CULT: NORMAL
BACTERIA BLD CULT: NORMAL

## 2025-04-28 NOTE — PROGRESS NOTES
Pharmacokinetic Assessment Follow Up: IV Vancomycin    Vancomycin serum concentration assessment(s):    The trough level was drawn correctly and can be used to guide therapy at this time. The measurement is within the desired definitive target range of 10 to 20 mcg/mL.    Vancomycin Regimen Plan:    Continue regimen to Vancomycin 1000 mg IV every 12 hours with next serum trough concentration measured at 0700 prior to 4th dose on 4-29-25    Drug levels (last 3 results):  Recent Labs   Lab Result Units 04/27/25  1846   Vancomycin Trough ug/ml 15.4       Pharmacy will continue to follow and monitor vancomycin.    Please contact pharmacy at extension [phone] for questions regarding this assessment.    Thank you for the consult,   Ekaterina Weber       Patient brief summary:  Magan Rose is a 84 y.o. male initiated on antimicrobial therapy with IV Vancomycin for treatment of bacteremia    The patient's current regimen is Vancomycin 1000mg ivpb q 12h    Drug Allergies:   Review of patient's allergies indicates:  No Known Allergies    Actual Body Weight:   65.2 kg    Renal Function:   Estimated Creatinine Clearance: 99.6 mL/min (based on SCr of 0.5 mg/dL).,     Dialysis Method (if applicable):  N/A    CBC (last 72 hours):  Recent Labs   Lab Result Units 04/25/25  0808 04/27/25  0520   WBC K/uL 9.17 7.75   HGB gm/dL 11.3* 11.1*   HCT % 36.5* 35.9*   Platelet Count K/uL  --  272   Lymph % % 15.5* 14.8*   Mono % % 12.3 12.5   Eos % % 0.8 0.6   Basophil % % 0.1 0.1       Metabolic Panel (last 72 hours):  Recent Labs   Lab Result Units 04/25/25  1204 04/27/25  0520   Sodium mmol/L 146* 147*   Potassium mmol/L 3.6 2.6*   Chloride mmol/L 108 107   CO2 mmol/L 29 31*   Glucose mg/dL 82 125*   BUN mg/dL 32* 19   Creatinine mg/dL 0.6 0.5   Albumin g/dL 2.9*  --    Bilirubin Total mg/dL 0.3  --    ALP unit/L 112  --    AST unit/L 12  --    ALT unit/L 6*  --    Magnesium  mg/dL 2.3  --    Phosphorus Level mg/dL 2.7  --        Vancomycin  Administrations:  vancomycin given in the last 96 hours                     vancomycin (VANCOCIN) 1,000 mg in 0.9% NaCl 250 mL IVPB (admixture device) (mg) 1,000 mg New Bag 04/27/25 0751     1,000 mg New Bag 04/26/25 2058    vancomycin 1,750 mg in 0.9% NaCl 500 mL IVPB (admixture device) (mg) 1,750 mg New Bag 04/26/25 0754                    Microbiologic Results:  Microbiology Results (last 7 days)       Procedure Component Value Units Date/Time    Blood culture x two cultures. Draw prior to antibiotics. [[id number]]  (Normal) Collected: 04/23/25 1653    Order Status: Completed Specimen: Blood from Peripheral, Forearm, Right Updated: 04/27/25 1802     Blood Culture No Growth After 96 hours    Blood culture x two cultures. Draw prior to antibiotics. [[id number]]  (Normal) Collected: 04/23/25 1653    Order Status: Completed Specimen: Blood from Peripheral, Forearm, Left Updated: 04/27/25 1802     Blood Culture No Growth After 96 hours    Urine culture [[id number]]  (Abnormal)  (Susceptibility) Collected: 04/23/25 2023    Order Status: Completed Specimen: Urine, Clean Catch Updated: 04/26/25 0625     Urine Culture >100,000 cfu/ml Escherichia coli ESBL      >100,000 cfu/ml Methicillin resistant Staphylococcus aureus    Narrative:      ESBL and MRSA called to Louisa Simmons 4/26/25 @0625    Clostridium difficile EIA [[id number]]     Order Status: Canceled Specimen: Stool

## 2025-04-28 NOTE — SIGNIFICANT EVENT
I was notified by patient's RN that patient became bradycardic to the 30s, had an episode of sinus pause, and later became bradycardic in the 20s-30s. Patient is DNR. A dose of atropine was ordered, EKG was ordered and I came to the bedside to evaluate the patient. Unfortunately, it seems like the patient is actively dying. I discussed with the patient's daughter, Brooke Rose, regarding sudden rhythm change and unlikelihood of survival. She stated they wanted him to be comfortable. Atropine was then deferred. Quickly after, patient went into asystole.     No spontaneous movements were present. Pupils were mid-dilated and fixed, no pupillary light reflex noted. No breath sounds were appreciated over either lung field. No carotid pulses palpable, no heart sounds audible over precordium. Patient pronounced dead at 11:39 on 4/27/2025. Daughter (Brooke) was notified over the phone.

## 2025-04-28 NOTE — NURSING
[facility] [place]  Nurses Note -- 4 Eyes      4/27/2025       Skin assessed on: Q Shift      [x] No Pressure Injuries Present    [x]Prevention Measures Documented    [] Yes LDA  for Pressure Injury Previously documented     [] Yes New Pressure Injury Discovered   [] LDA for New Pressure Injury Added      Attending RN:  Naila Chase LPN     Second RN:  Surjit

## 2025-05-01 NOTE — DISCHARGE SUMMARY
"[facility]  Discharge Summary      Patient Name: Magan Rose  MRN: [id number]  SILVANO: [id number]  Patient Class: IP- Inpatient  Admission Date: 4/23/2025  Hospital Length of Stay: 5 days  Discharge Date and Time: 05/01/2025 8:56 AM  Attending Physician: No att. providers found   Discharging Provider: Kiel Reyes MD  Primary Care Provider: Andrae Sanchez MD    Primary Care Team: Networked reference to record PCT     HPI:   Magan Rose is a 84 y.o. male with  Indwelling suprapubic catheter for at least 4 years, Non-Insulin-dependent DM2, HTN, HLD, AFib not on AC, and   Dementia who presented to [facility] ED for eval and treatment of chronic decubitus ulcer and decreased p.o. intake.  In emergency department, patient denies any complaints. Dtr reports noticing urine has been cloudy for several weeks, as well as a decrease in appetite over the past month, and has lost a substantial amount of weight and overall decline in his health over the last 6 months ("200 lbs" was recorded about 10 months ago; pt appears much less than that on presentation).  Dtr also says that he has a wet cough that he has not been able to always cough up.   He was hospitalized at [facility] over the [place] with a Proteus mirabilis UTI.    ED course: Lactate 2.67.  UA obtained but still pending on admission; delayed by lab processing.  BP soft in ED and required supplemental O2, but no fever.  Exam with underweight appearance, disoriented.  Labs WBC 12.7 Hgb 13.  BUN 36 Crt 0.8.  BNP Trop WNL.  EKG Sinus tachycardia w freq PACs.  CXR without large consolidation.  ED treatments: , CTX 1g.  Suprapubic catheter exchanged.  They were admitted to [facility] for further evaluation and treatment.    * No surgery found *      Hospital Course:   Admitted from with decreased oral intake, and FTT with weight loss. Found to have UTI, has had recent ESBL Ecoli, will start Ertapenem until blood cx and " urine cx result. Palliative care consult. Wound care consult for sacral wound, as well as buttocks and heal. Appears to have lost 68 lbs this year, daughter says it has been in the last month or two which is highly concerning for malignancy vs advanced dementia FTT. I spoke about code status with her and she would like to continue that conversation with palliative team today.      E.coli and GBS in urine. Previous E.coli was ESBL, ertapenem should cover both for now. Urine culture did come back with ESBL Ecoli, as well as MRSA. Vanc added, contact precautions.    Wt Readings from Last 4 Encounters:   04/23/25 60 kg (132 lb 4.4 oz)   06/23/24 90.7 kg (200 lb)   09/03/23 90.7 kg (200 lb)   07/10/23 90.7 kg (199 lb 15.3 oz)        Patient had sudden deterioration, placed on NRB and sats still 74%  Perhaps flash pulmonary edema vs PE vs PTX vs Aspiration  Aspiration is high on ddx given state  Will switch to Bipap  Called daughter and updated, she will come up here  Air hunger, will give small dose IV morphine 2 mg  Stat CXR ordered  LA and ABG ordered  Pulm consult     Does have some blood in urine but Hb was stable   Was on small amount of fluids/d5 as his Na was coming up     Addendum:   Spoke to dtr, now bedside, her brother is coming from [place].   Given pt not on dvt ppx (gross hematuria), dtr would like to proceed with CTA to see if he has a clot, then her and brother will make decision about full comfort measures/hospice.      AHHRF  pH low and PCO2 high, will leave on bipap for a couple hours    -Bell           I was notified by patient's RN that patient became bradycardic to the 30s, had an episode of sinus pause, and later became bradycardic in the 20s-30s. Patient is DNR. A dose of atropine was ordered, EKG was ordered and I came to the bedside to evaluate the patient. Unfortunately, it seems like the patient is actively dying. I discussed with the patient's daughter, Brooke Rose, regarding sudden  rhythm change and unlikelihood of survival. She stated they wanted him to be comfortable. Atropine was then deferred. Quickly after, patient went into asystole.      No spontaneous movements were present. Pupils were mid-dilated and fixed, no pupillary light reflex noted. No breath sounds were appreciated over either lung field. No carotid pulses palpable, no heart sounds audible over precordium. Patient pronounced dead at 11:39 on 4/27/2025. Daughter (Brooke) was notified over the phone.         -Dr Jaramillo      Goals of Care Treatment Preferences:  Code Status: DNR    Living Will: Yes  LaPOST: Yes  What is most important right now is to focus on remaining as independent as possible, symptom/pain control, improvement in condition but with limits to invasive therapies.  Accordingly, we have decided that the best plan to meet the patient's goals includes continuing with treatment.         Consults:   Consults (From admission, onward)          Status Ordering Provider     Inpatient consult to Pulmonology  Once        Provider:  Alberto Holland MD    Completed ISAI ROSALES A     Inpatient consult to Registered Dietitian/Nutritionist  Once        Provider:  (Not yet assigned)    Completed ISAI ROSALES A     Inpatient consult to Palliative Care  Once        Provider:  Brooke Verdugo NP    Completed ISAI ROSALES A            Assessment & Plan  Complicated urinary tract infection  This 84 y.o. male presented with the following signs & symptoms of a UTI: abnormal smelling urine, foul smelling urine, and encephalopathy .  They do have signs/sxs that suggest infection extends beyond the bladder: malaise.    Objective UTI findings so far include:  UA on admission .    The pt does have a recently-documented UTI confirmed with UCx: Proteus mirabilis at [facility] in January.  Suspect this patient has Complicated cystitis.  Treatment per IDSA guidelines as follows:    F/u urine gram stain and culture  Given hx of ESBL Ecoli, will  "start Ertapenem.   If pt becomes febrile then draw 2 blood culture tubes, each from different site and initiate Sepsis protocols      Chronic indwelling Vargas catheter  Per UTI.    Type 2 diabetes mellitus, controlled  Patient's FSGs are controlled on current medication regimen.  Last A1c reviewed-   Lab Results   Component Value Date    HGBA1C 5.3 12/10/2020     Most recent fingerstick glucose reviewed-   No results for input(s): "POCTGLUCOSE" in the last 24 hours.    Current correctional scale   low  Maintain anti-hyperglycemic dose as follows-   Antihyperglycemics (From admission, onward)      None          Hold Oral hypoglycemics while patient is in the hospital.  Chronic heart failure with preserved ejection fraction  Well-compensated on exam.  Holding home Coreg and amlodipine in setting of sepsis concern, will consider resuming when stable but more likely defer to PCP given how frail he has become (so as not to increase chances of orthostasis).    Essential hypertension  Per HFpEF.  Non-occlusive coronary artery disease  Not on statin, ASA, or Plavix.  On Tele, WCTM.    Anemia of chronic disease  Anemia is likely due to chronic disease due to Chronic Kidney Disease. Most recent hemoglobin and hematocrit are listed below.  No results for input(s): "HGB", "HCT" in the last 72 hours.    Plan  - Monitor serial CBC: Daily  - Transfuse PRBC if patient becomes hemodynamically unstable, symptomatic or H/H drops below 7/21.  - Patient has not received any PRBC transfusions to date  - Patient's anemia is currently stable      Legally blind  Will use frequent verbal assurances and cues, and assist with feeding      Nursing home resident  CM/SW consult to assist with his return to [facility] when ready.      ACP (advance care planning)      Severe protein-calorie malnutrition  Nutrition consulted. Most recent weight and BMI monitored-     Measurements:  Wt Readings from Last 1 Encounters:   04/25/25 65.2 kg (143 lb 11.8 " oz)   Body mass index is 20.05 kg/m².    Patient has been screened and assessed by RD.    Malnutrition Type:  Context: chronic illness  Level: severe    Malnutrition Characteristic Summary:  Weight Loss (Malnutrition): greater than 20% in 1 year  Energy Intake (Malnutrition): less than 75% for greater than or equal to 3 months    Interventions/Recommendations (treatment strategy):  1. Continue current diet as tolerated, texture/consistency modifications per SLP/MD. 2. Continue to provide Boost Plus TID, offer more often if pt accepting/willing to consume.  Addition of Cam BID to promote wound healing.  3. Provide assistance with all meals and ONS. 4. Monitor weight/labs. 5. RD to follow and monitor nutrition status    Acute respiratory failure with hypoxia and hypercarbia  Patient with Hypercapnic and Hypoxic Respiratory failure which is Acute.  he is not on home oxygen. Supplemental oxygen was provided and noted-      .   Signs/symptoms of respiratory failure include- tachypnea, increased work of breathing, respiratory distress, use of accessory muscles, and lethargy. Contributing diagnoses includes - Aspiration Labs and images were reviewed. Patient Has recent ABG, which has been reviewed. Will treat underlying causes and adjust management of respiratory failure as follows-   Final Active Diagnoses:    Diagnosis Date Noted POA    PRINCIPAL PROBLEM:  Complicated urinary tract infection [N39.0] 01/18/2020 Yes    Acute respiratory failure with hypoxia and hypercarbia [J96.01, J96.02] 04/27/2025 No    Severe protein-calorie malnutrition [E43] 04/24/2025 Yes    Chronic indwelling Vargas catheter [Z97.8] 04/23/2025 Not Applicable     Chronic    Nursing home resident [Z78.9] 04/23/2025 Yes     Chronic    Legally blind [H54.8] 03/21/2023 Yes     Chronic    ACP (advance care planning) [Z71.89] 03/20/2023 Not Applicable    Anemia of chronic disease [D63.8] 12/05/2017 Yes     Chronic    Non-occlusive coronary artery disease  [I25.10] 10/04/2017 Yes     Chronic    Chronic heart failure with preserved ejection fraction [I50.32] 2016 Yes     Chronic    Essential hypertension [I10] 2014 Yes     Chronic    Type 2 diabetes mellitus, controlled [E11.9] 2014 Yes      Problems Resolved During this Admission:       Discharged Condition:     Disposition:     Follow Up:    Patient Instructions:   No discharge procedures on file.    Significant Diagnostic Studies:   Recent Results (from the past 100 hours)   Basic Metabolic Panel    Collection Time: 25  5:20 AM   Result Value Ref Range    Sodium 147 (H) 136 - 145 mmol/L    Potassium 2.6 (LL) 3.5 - 5.1 mmol/L    Chloride 107 95 - 110 mmol/L    CO2 31 (H) 23 - 29 mmol/L    Glucose 125 (H) 70 - 110 mg/dL    BUN 19 8 - 23 mg/dL    Creatinine 0.5 0.5 - 1.4 mg/dL    Calcium 9.0 8.7 - 10.5 mg/dL    Anion Gap 9 8 - 16 mmol/L    eGFR >60 >60 mL/min/1.73/m2   CBC with Differential    Collection Time: 25  5:20 AM   Result Value Ref Range    WBC 7.75 3.90 - 12.70 K/uL    RBC 4.24 (L) 4.60 - 6.20 M/uL    HGB 11.1 (L) 14.0 - 18.0 gm/dL    HCT 35.9 (L) 40.0 - 54.0 %    MCV 85 82 - 98 fL    MCH 26.2 (L) 27.0 - 31.0 pg    MCHC 30.9 (L) 32.0 - 36.0 g/dL    RDW 15.9 (H) 11.5 - 14.5 %    Platelet Count 272 150 - 450 K/uL    MPV 10.8 9.2 - 12.9 fL    Nucleated RBC 0 <=0 /100 WBC    Neut % 71.6 38 - 73 %    Lymph % 14.8 (L) 18 - 48 %    Mono % 12.5 4 - 15 %    Eos % 0.6 <=8 %    Basophil % 0.1 <=1.9 %    Imm Grans % 0.4 0.0 - 0.5 %    Neut # 5.54 1.8 - 7.7 K/uL    Lymph # 1.15 1 - 4.8 K/uL    Mono # 0.97 0.3 - 1 K/uL    Eos # 0.05 <=0.5 K/uL    Baso # 0.01 <=0.2 K/uL    Imm Grans # 0.03 0.00 - 0.04 K/uL   ISTAT PROCEDURE    Collection Time: 25  8:27 AM   Result Value Ref Range    POC PH 7.229 (LL) 7.35 - 7.45    POC PCO2 82.8 (HH) 35 - 45 mmHg    POC PO2 251 (H) 80 - 100 mmHg    POC HCO3 34.6 (H) 24 - 28 mmol/L    POC BE 5 (H) -2 to 2 mmol/L    POC SATURATED O2 100 95 -  100 %    POC TCO2 37 (H) 23 - 27 mmol/L    Rate 12     Sample ARTERIAL     Site RR     Allens Test Pass     DelSys CPAP/BiPAP     Mode BiPAP     FiO2 100     IP 14     EP 7    Lactic Acid, Plasma    Collection Time: 04/27/25  8:36 AM   Result Value Ref Range    Lactic Acid Level 1.4 0.5 - 2.2 mmol/L   Procalcitonin    Collection Time: 04/27/25  8:36 AM   Result Value Ref Range    Procalcitonin 0.05 <0.25 ng/mL   D dimer, quantitative    Collection Time: 04/27/25  9:53 AM   Result Value Ref Range    D-Dimer 2.52 (H) <0.50 mg/L FEU   POCT glucose    Collection Time: 04/27/25 11:34 AM   Result Value Ref Range    POCT Glucose 111 (H) 70 - 110 mg/dL   POCT glucose    Collection Time: 04/27/25  4:23 PM   Result Value Ref Range    POCT Glucose 83 70 - 110 mg/dL   VANCOMYCIN, TROUGH    Collection Time: 04/27/25  6:46 PM   Result Value Ref Range    Vancomycin Trough 15.4 10.0 - 22.0 ug/ml   POCT glucose    Collection Time: 04/27/25  9:16 PM   Result Value Ref Range    POCT Glucose 122 (H) 70 - 110 mg/dL       Microbiology Results (last 7 days)       Procedure Component Value Units Date/Time    Urine culture [[id number]]  (Abnormal)  (Susceptibility) Collected: 04/23/25 2023    Order Status: Completed Specimen: Urine, Clean Catch Updated: 04/26/25 0625     Urine Culture >100,000 cfu/ml Escherichia coli ESBL      >100,000 cfu/ml Methicillin resistant Staphylococcus aureus    Narrative:      ESBL and MRSA called to Louisa Simmons 4/26/25 @0625    Clostridium difficile EIA [[id number]]     Order Status: Canceled Specimen: Stool             Imaging Results              X-Ray Chest AP Portable (Final result)  Result time 04/23/25 17:32:06      Final result by Johnny Thomas MD (04/23/25 17:32:06)                   Impression:      1. Chronic appearing interstitial findings, no large focal consolidation.      Electronically signed by: Johnny Thomas MD  Date:    04/23/2025  Time:    17:32               Narrative:     EXAMINATION:  XR CHEST AP PORTABLE    CLINICAL HISTORY:  Sepsis;    TECHNIQUE:  Single frontal view of the chest was performed.    COMPARISON:  2024    FINDINGS:  The cardiomediastinal silhouette is not enlarged noting tortuosity of the aorta..  There is no pleural effusion.  The trachea is midline.  The lungs are symmetrically expanded bilaterally with coarse interstitial attenuation bilaterally..  No large focal consolidation seen.  There is no pneumothorax.  The osseous structures are remarkable for degenerative change and osteopenia.  There is gas and stool distention of the bowel..                                          Pending Diagnostic Studies:       None           Medications:      Indwelling Lines/Drains at time of discharge:   Lines/Drains/Airways       Drain  Duration                  Suprapubic Catheter 25 1700 7 days                    Time spent on the discharge of patient: 35 minutes         Kiel Reyes MD  Department of Hospital Medicine  [facility] [place] [facility]

## 2025-05-01 NOTE — ASSESSMENT & PLAN NOTE
"Anemia is likely due to chronic disease due to Chronic Kidney Disease. Most recent hemoglobin and hematocrit are listed below.  No results for input(s): "HGB", "HCT" in the last 72 hours.    Plan  - Monitor serial CBC: Daily  - Transfuse PRBC if patient becomes hemodynamically unstable, symptomatic or H/H drops below 7/21.  - Patient has not received any PRBC transfusions to date  - Patient's anemia is currently stable      "

## 2025-05-01 NOTE — ASSESSMENT & PLAN NOTE
Patient with Hypercapnic and Hypoxic Respiratory failure which is Acute.  he is not on home oxygen. Supplemental oxygen was provided and noted-      .   Signs/symptoms of respiratory failure include- tachypnea, increased work of breathing, respiratory distress, use of accessory muscles, and lethargy. Contributing diagnoses includes - Aspiration Labs and images were reviewed. Patient Has recent ABG, which has been reviewed. Will treat underlying causes and adjust management of respiratory failure as follows-

## 2025-05-01 NOTE — ASSESSMENT & PLAN NOTE
"Patient's FSGs are controlled on current medication regimen.  Last A1c reviewed-   Lab Results   Component Value Date    HGBA1C 5.3 12/10/2020     Most recent fingerstick glucose reviewed-   No results for input(s): "POCTGLUCOSE" in the last 24 hours.    Current correctional scale  low  Maintain anti-hyperglycemic dose as follows-   Antihyperglycemics (From admission, onward)      None          Hold Oral hypoglycemics while patient is in the hospital.  "

## 2025-05-01 NOTE — ASSESSMENT & PLAN NOTE
This 84 y.o. male presented with the following signs & symptoms of a UTI: abnormal smelling urine, foul smelling urine, and encephalopathy.  They do have signs/sxs that suggest infection extends beyond the bladder: malaise.    Objective UTI findings so far include: UA on admission.    The pt does have a recently-documented UTI confirmed with UCx: Proteus mirabilis at [facility] in January.  Suspect this patient has Complicated cystitis.  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   If pt becomes febrile then draw 2 blood culture tubes, each from different site and initiate Sepsis protocols

## 2025-05-08 NOTE — PHYSICIAN QUERY
Please clarify the infectious disease diagnosis.    Sepsis due to suspected organism (please specify): ESBL ecoli and MRSA